# Patient Record
Sex: MALE | Race: WHITE | NOT HISPANIC OR LATINO | Employment: FULL TIME | URBAN - METROPOLITAN AREA
[De-identification: names, ages, dates, MRNs, and addresses within clinical notes are randomized per-mention and may not be internally consistent; named-entity substitution may affect disease eponyms.]

---

## 2023-10-25 ENCOUNTER — TELEPHONE (OUTPATIENT)
Dept: CARDIOLOGY | Facility: CLINIC | Age: 68
End: 2023-10-25
Payer: MEDICARE

## 2023-10-25 RX ORDER — FOLIC ACID/MULTIVIT,IRON,MINER 0.4MG-18MG
TABLET ORAL
COMMUNITY

## 2023-10-25 RX ORDER — METOPROLOL SUCCINATE 100 MG/1
100 TABLET, EXTENDED RELEASE ORAL DAILY
COMMUNITY
Start: 2023-08-11 | End: 2024-05-17 | Stop reason: SDUPTHER

## 2023-10-25 RX ORDER — ATORVASTATIN CALCIUM 20 MG/1
20 TABLET, FILM COATED ORAL DAILY
COMMUNITY
Start: 2023-09-05 | End: 2024-03-04 | Stop reason: SDUPTHER

## 2023-10-25 RX ORDER — LISINOPRIL 10 MG/1
10 TABLET ORAL DAILY
COMMUNITY
End: 2024-05-17 | Stop reason: SDUPTHER

## 2023-10-25 NOTE — TELEPHONE ENCOUNTER
He needs a refill on his eliquis, but it goes to the [place] [facility]- can you please print it out to be faxed

## 2023-10-30 DIAGNOSIS — I48.91 ATRIAL FIBRILLATION, UNSPECIFIED TYPE (MULTI): Primary | ICD-10-CM

## 2023-11-15 ENCOUNTER — TELEPHONE (OUTPATIENT)
Dept: CARDIOLOGY | Facility: CLINIC | Age: 68
End: 2023-11-15
Payer: MEDICARE

## 2023-12-01 RX ORDER — DEXTROMETHORPHAN HYDROBROMIDE, GUAIFENESIN 5; 100 MG/5ML; MG/5ML
650 LIQUID ORAL EVERY 8 HOURS PRN
COMMUNITY

## 2023-12-04 ENCOUNTER — HOSPITAL ENCOUNTER (OUTPATIENT)
Dept: GASTROENTEROLOGY | Facility: HOSPITAL | Age: 68
Discharge: HOME | End: 2023-12-04
Payer: MEDICARE

## 2023-12-04 ENCOUNTER — ANESTHESIA EVENT (OUTPATIENT)
Dept: GASTROENTEROLOGY | Facility: HOSPITAL | Age: 68
End: 2023-12-04
Payer: MEDICARE

## 2023-12-04 ENCOUNTER — ANESTHESIA (OUTPATIENT)
Dept: GASTROENTEROLOGY | Facility: HOSPITAL | Age: 68
End: 2023-12-04
Payer: MEDICARE

## 2023-12-04 VITALS
TEMPERATURE: 97.2 F | BODY MASS INDEX: 33.02 KG/M2 | RESPIRATION RATE: 16 BRPM | HEART RATE: 80 BPM | DIASTOLIC BLOOD PRESSURE: 88 MMHG | OXYGEN SATURATION: 97 % | HEIGHT: 73 IN | SYSTOLIC BLOOD PRESSURE: 156 MMHG | WEIGHT: 249.12 LBS

## 2023-12-04 DIAGNOSIS — Z12.11 ENCOUNTER FOR SCREENING FOR MALIGNANT NEOPLASM OF COLON: Primary | ICD-10-CM

## 2023-12-04 PROBLEM — I10 HTN (HYPERTENSION): Status: ACTIVE | Noted: 2023-12-04

## 2023-12-04 PROBLEM — I48.91 ATRIAL FIBRILLATION (MULTI): Status: ACTIVE | Noted: 2023-12-04

## 2023-12-04 PROCEDURE — 0753T DGTZ GLS MCRSCP SLD LEVEL IV: CPT | Mod: TC,SUR,PARLAB | Performed by: INTERNAL MEDICINE

## 2023-12-04 PROCEDURE — 88305 TISSUE EXAM BY PATHOLOGIST: CPT | Performed by: PATHOLOGY

## 2023-12-04 PROCEDURE — 3700000002 HC GENERAL ANESTHESIA TIME - EACH INCREMENTAL 1 MINUTE

## 2023-12-04 PROCEDURE — 2500000004 HC RX 250 GENERAL PHARMACY W/ HCPCS (ALT 636 FOR OP/ED): Performed by: NURSE ANESTHETIST, CERTIFIED REGISTERED

## 2023-12-04 PROCEDURE — A45385 PR COLONOSCOPY,REMV LESN,SNARE: Performed by: ANESTHESIOLOGY

## 2023-12-04 PROCEDURE — 45385 COLONOSCOPY W/LESION REMOVAL: CPT | Performed by: INTERNAL MEDICINE

## 2023-12-04 PROCEDURE — 2500000005 HC RX 250 GENERAL PHARMACY W/O HCPCS: Performed by: NURSE ANESTHETIST, CERTIFIED REGISTERED

## 2023-12-04 PROCEDURE — 7100000010 HC PHASE TWO TIME - EACH INCREMENTAL 1 MINUTE

## 2023-12-04 PROCEDURE — 3700000001 HC GENERAL ANESTHESIA TIME - INITIAL BASE CHARGE

## 2023-12-04 PROCEDURE — A45385 PR COLONOSCOPY,REMV LESN,SNARE: Performed by: NURSE ANESTHETIST, CERTIFIED REGISTERED

## 2023-12-04 PROCEDURE — 2500000004 HC RX 250 GENERAL PHARMACY W/ HCPCS (ALT 636 FOR OP/ED): Performed by: INTERNAL MEDICINE

## 2023-12-04 PROCEDURE — 45380 COLONOSCOPY AND BIOPSY: CPT | Performed by: INTERNAL MEDICINE

## 2023-12-04 PROCEDURE — 7100000009 HC PHASE TWO TIME - INITIAL BASE CHARGE

## 2023-12-04 RX ORDER — PROPOFOL 10 MG/ML
INJECTION, EMULSION INTRAVENOUS AS NEEDED
Status: DISCONTINUED | OUTPATIENT
Start: 2023-12-04 | End: 2023-12-04

## 2023-12-04 RX ORDER — ONDANSETRON HYDROCHLORIDE 2 MG/ML
4 INJECTION, SOLUTION INTRAVENOUS ONCE AS NEEDED
Status: DISCONTINUED | OUTPATIENT
Start: 2023-12-04 | End: 2023-12-05 | Stop reason: HOSPADM

## 2023-12-04 RX ORDER — PROPOFOL 10 MG/ML
INJECTION, EMULSION INTRAVENOUS CONTINUOUS PRN
Status: DISCONTINUED | OUTPATIENT
Start: 2023-12-04 | End: 2023-12-04

## 2023-12-04 RX ORDER — SODIUM CHLORIDE, SODIUM LACTATE, POTASSIUM CHLORIDE, CALCIUM CHLORIDE 600; 310; 30; 20 MG/100ML; MG/100ML; MG/100ML; MG/100ML
20 INJECTION, SOLUTION INTRAVENOUS CONTINUOUS
Status: DISCONTINUED | OUTPATIENT
Start: 2023-12-04 | End: 2023-12-05 | Stop reason: HOSPADM

## 2023-12-04 RX ORDER — LIDOCAINE HCL/PF 100 MG/5ML
SYRINGE (ML) INTRAVENOUS AS NEEDED
Status: DISCONTINUED | OUTPATIENT
Start: 2023-12-04 | End: 2023-12-04

## 2023-12-04 RX ADMIN — PROPOFOL 30 MG: 10 INJECTION, EMULSION INTRAVENOUS at 13:10

## 2023-12-04 RX ADMIN — LIDOCAINE HYDROCHLORIDE 60 MG: 20 INJECTION INTRAVENOUS at 13:08

## 2023-12-04 RX ADMIN — PROPOFOL 30 MG: 10 INJECTION, EMULSION INTRAVENOUS at 13:08

## 2023-12-04 RX ADMIN — PROPOFOL 150 MCG/KG/MIN: 10 INJECTION, EMULSION INTRAVENOUS at 13:08

## 2023-12-04 RX ADMIN — SODIUM CHLORIDE, SODIUM LACTATE, POTASSIUM CHLORIDE, AND CALCIUM CHLORIDE: 600; 310; 30; 20 INJECTION, SOLUTION INTRAVENOUS at 13:02

## 2023-12-04 SDOH — HEALTH STABILITY: MENTAL HEALTH: CURRENT SMOKER: 0

## 2023-12-04 ASSESSMENT — PAIN SCALES - GENERAL
PAINLEVEL_OUTOF10: 0 - NO PAIN

## 2023-12-04 ASSESSMENT — PAIN - FUNCTIONAL ASSESSMENT: PAIN_FUNCTIONAL_ASSESSMENT: 0-10

## 2023-12-04 NOTE — DISCHARGE INSTRUCTIONS
Patient Instructions after a Colonoscopy      The anesthetics, sedatives or narcotics which were given to you today will be acting in your body for the next 24 hours, so you might feel a little sleepy or groggy.  This feeling should slowly wear off. Carefully read and follow the instructions.     You received sedation today:  - Do not drive or operate any machinery or power tools of any kind.   - No alcoholic beverages today, not even beer or wine.  - Do not make any important decisions or sign any legal documents.  - No over the counter medications that contain alcohol or that may cause drowsiness.  - Do not make any important decisions or sign any legal documents.    While it is common to experience mild to moderate abdominal distention, gas, or belching after your procedure, if any of these symptoms occur following discharge from the GI Lab or within one week of having your procedure, call the [facility] to be advised whether a visit to your nearest Urgent Care or Emergency Department is indicated.  Take this paper with you if you go.     - If you develop an allergic reaction to the medications that were given during your procedure such as difficulty breathing, rash, hives, severe nausea, vomiting or lightheadedness.  - If you experience chest pain, shortness of breath, severe abdominal pain, fevers and chills.  -If you develop signs and symptoms of bleeding such as blood in your spit, if your stools turn black, tarry, or bloody  - If you have not urinated within 8 hours following your procedure.  - If your IV site becomes painful, red, inflamed, or looks infected.    If you received a biopsy/polypectomy/sphincterotomy the following instructions apply below:    __ Do not use Aspirin containing products, non-steroidal medications or anti-coagulants for one week following your procedure. (Examples of these types of medications are: Advil, Arthrotec, Aleve, Coumadin, Ecotrin, Heparin, Ibuprofen,  Indocin, Motrin, Naprosyn, Nuprin, Plavix, Vioxx, and Voltarin, or their generic forms.  This list is not all-inclusive.  Check with your physician or pharmacist before resuming medications.)   __ Eat a soft diet today.  Avoid foods that are poorly digested for the next 24 hours.  These foods would include: nuts, beans, lettuce, red meats, and fried foods. Start with liquids and advance your diet as tolerated, gradually work up to eating solids.   __ Do not have a Barium Study or Enema for one week.    Your physician recommends the additional following instructions:    -You have a contact number available for emergencies. The signs and symptoms of potential delayed complications were discussed with you. You may return to normal activities tomorrow.  -Resume your previous diet.  -Continue your present medications.   -We are waiting for your pathology results.  -Your physician has recommended a repeat colonoscopy (date to be determined after pending pathology results are reviewed) for surveillance based on pathology results.  -The findings and recommendations have been discussed with you.  -The findings and recommendations were discussed with your family.  - Please see Medication Reconciliation Form for new medication/medications prescribed.       If you experience any problems or have any questions following discharge from the GI Lab, please call:        Nurse Signature                                                                        Date___________________                                                                            Patient/Responsible Party Signature                                        Date___________________

## 2023-12-04 NOTE — ANESTHESIA POSTPROCEDURE EVALUATION
Patient: Navi Valdez    Procedure Summary       Date: 12/04/23 Room / Location: [facility]    Anesthesia Start: 1302 Anesthesia Stop: 1334    Procedure: COLONOSCOPY Diagnosis:       Encounter for screening for malignant neoplasm of colon      Encounter for screening for malignant neoplasm of colon    Scheduled Providers: Dea Licona MD Responsible Provider: Jj Russo MD    Anesthesia Type: MAC ASA Status: 3            Anesthesia Type: MAC    Vitals Value Taken Time   /81 12/04/23 1345   Temp 36.2 °C (97.2 °F) 12/04/23 1334   Pulse 78 12/04/23 1345   Resp 16 12/04/23 1345   SpO2 97 % 12/04/23 1345       Anesthesia Post Evaluation    Patient location during evaluation: PACU  Patient participation: complete - patient participated  Level of consciousness: awake and alert  Pain management: adequate  Airway patency: patent  Cardiovascular status: acceptable  Respiratory status: acceptable  Hydration status: acceptable  Postoperative Nausea and Vomiting: none        No notable events documented.

## 2023-12-04 NOTE — POST-PROCEDURE NOTE
Pt is tolerating PO snack well.  Reviewed discharge instructions, educated pt and family on anesthesia safety. Pt states is a -will need a note/work excuse for no driving 24hrs-note given  All pre-op belongings with the pt.

## 2023-12-04 NOTE — ANESTHESIA PREPROCEDURE EVALUATION
Patient: Navi Valdez    Procedure Information       Date/Time: 12/04/23 1300    Scheduled providers: Dea Licona MD    Procedure: COLONOSCOPY    Location: [facility]            Relevant Problems   Anesthesia (within normal limits)      Cardiovascular   (+) Atrial fibrillation (CMS/HCC)   (+) HTN (hypertension)       Clinical information reviewed:   Tobacco  Allergies  Meds   Med Hx  Surg Hx   Fam Hx  Soc Hx        NPO Detail:  NPO/Void Status  Carbonhydrate Drink Given Prior to Surgery? : N  Date of Last Liquid: 12/03/23  Time of Last Liquid: 0600  Date of Last Solid: 12/02/23  Time of Last Solid: 0600  Last Intake Type: Solid meal  Time of Last Void: 1238         Physical Exam    Airway  Mallampati: II  TM distance: >3 FB  Neck ROM: full     Cardiovascular   Rhythm: irregular  Rate: normal     Dental   (+) upper dentures, lower dentures     Pulmonary - normal exam     Abdominal            Anesthesia Plan    ASA 3     MAC     The patient is not a current smoker.    Anesthetic plan and risks discussed with patient.    Plan discussed with CRNA.

## 2023-12-11 LAB
LABORATORY COMMENT REPORT: NORMAL
PATH REPORT.FINAL DX SPEC: NORMAL
PATH REPORT.GROSS SPEC: NORMAL
PATH REPORT.TOTAL CANCER: NORMAL

## 2023-12-12 NOTE — RESULT ENCOUNTER NOTE
"Dear  Mr. Valdez,    It was a pleasure to see you at [facility] for your colonoscopy. During the procedure, polyp(s) were detected.  The biopsy from the polyp(s) showed that the removed polyp(s) was \"Adenomatous\". Adenomatous polyps may progress into Colon cancer over time if they are not removed. The polyps seen during your procedure was (were) removed completely.    Biopsy of the polyp(s) did not reveal cancer.    Because we have concerns that you may develop adenomatous polyps in the future we would like to repeat the colonoscopy in  3 years.    Please keep this letter for your records and talk to your PCP regarding a referral for a repeat colonoscopy when it is due.    Thank you for the opportunity to participate in your care.     If you have any questions or concerns about the findings or my recommendations please do not hesitate to contact our office at [phone].    Best Regards,  Dr. Licona"

## 2024-01-31 DIAGNOSIS — M25.551 ACUTE HIP PAIN, RIGHT: ICD-10-CM

## 2024-02-01 ENCOUNTER — HOSPITAL ENCOUNTER (OUTPATIENT)
Dept: RADIOLOGY | Facility: CLINIC | Age: 69
Discharge: HOME | End: 2024-02-01
Payer: MEDICARE

## 2024-02-01 ENCOUNTER — OFFICE VISIT (OUTPATIENT)
Dept: ORTHOPEDIC SURGERY | Facility: CLINIC | Age: 69
End: 2024-02-01
Payer: MEDICARE

## 2024-02-01 VITALS — HEIGHT: 73 IN | WEIGHT: 260 LBS | BODY MASS INDEX: 34.46 KG/M2

## 2024-02-01 DIAGNOSIS — M25.552 LEFT HIP PAIN: ICD-10-CM

## 2024-02-01 DIAGNOSIS — M17.12 ARTHRITIS OF LEFT KNEE: ICD-10-CM

## 2024-02-01 DIAGNOSIS — Z96.642 HISTORY OF LEFT HIP REPLACEMENT: Primary | ICD-10-CM

## 2024-02-01 PROCEDURE — 1036F TOBACCO NON-USER: CPT | Performed by: ORTHOPAEDIC SURGERY

## 2024-02-01 PROCEDURE — 73502 X-RAY EXAM HIP UNI 2-3 VIEWS: CPT | Mod: LEFT SIDE | Performed by: RADIOLOGY

## 2024-02-01 PROCEDURE — 73502 X-RAY EXAM HIP UNI 2-3 VIEWS: CPT | Mod: LT

## 2024-02-01 PROCEDURE — 99213 OFFICE O/P EST LOW 20 MIN: CPT | Performed by: ORTHOPAEDIC SURGERY

## 2024-02-01 PROCEDURE — 1126F AMNT PAIN NOTED NONE PRSNT: CPT | Performed by: ORTHOPAEDIC SURGERY

## 2024-02-01 PROCEDURE — 1160F RVW MEDS BY RX/DR IN RCRD: CPT | Performed by: ORTHOPAEDIC SURGERY

## 2024-02-01 PROCEDURE — 1159F MED LIST DOCD IN RCRD: CPT | Performed by: ORTHOPAEDIC SURGERY

## 2024-02-01 NOTE — PROGRESS NOTES
Subjective    Patient ID: Navi Valdez is a 68 y.o. male.    Chief Complaint: OTHER (F/U LT SANDRA/DOS: 1/13/23/)       This is a 68-year-old male who is now 1 year status post left SANDRA for severe arthritis.  Patient states he has done extremely well and is not experiencing any groin or thigh pain.  With regard to his hip he is performing functions of ADL without issue including continuing to work as a .  His main complaint is left knee pain as a result of underlying arthritis.  He wishes to consider left knee replacement potentially this spring or summer.  In the past he has had injections into his left knee as well as other conservative treatment options.    This patient's past medical, social, and family history were reviewed as well as a review of systems including updates on the patient's information encounter sheet    Physical Examination  Constitutional: Patient's height and weight reviewed, appears well kempt  Psychiatric: Alert and oriented ×3, appropriate mood and behavior  Pulmonary: Breathing appears nonlabored, no apparent distress  Lymphatic: No appreciable lymphadenopathy to both the upper and lower extremities  Skin: No open lesions, rashes, ulcerations  Neurologic: Gross motor and sensory exam appear intact (except for abnormalities noted in the below muscle skeletal exam)    Musculoskeletal: The left hip is well aligned without rotation deformity nor shortening.  Excellent range of motion left hip without groin or thigh pain elicited.  Ambulates weightbearing as tolerated in the office today without the use of assistive device and a satisfactory gait.  The left knee has a lack of extension of 5 degrees and flexion limited to 115 degrees.  A varus alignment of 5 degrees and inability to correct to a valgus stress for the left knee    X-rays performed today of the left hip demonstrate the left total hip arthroplasty remains well-positioned without evidence component loosening    Assessment:  Doing well status post left SANDRA, left knee arthritis    Plan: With regard to the left hip he will follow-up every 3 years or so for reevaluation to include x-rays.  Otherwise he will perform functions of ADL as tolerated and avoid fall risk.  With regard to the left knee he will follow-up with Dr. Seth in the near future discussed the options for left total knee arthroplasty.          Current Outpatient Medications:     acetaminophen (Tylenol 8 HOUR) 650 mg ER tablet, Take 1 tablet (650 mg) by mouth every 8 hours if needed for mild pain (1 - 3). Do not crush, chew, or split., Disp: , Rfl:     apixaban (Eliquis) 5 mg tablet, Take 1 tablet (5 mg) by mouth 2 times a day., Disp: 90 tablet, Rfl: 3    atorvastatin (Lipitor) 20 mg tablet, Take 1 tablet (20 mg) by mouth once daily., Disp: , Rfl:     krill-omega-3-dha-epa-lipids 247-47-63-50 mg capsule, Take by mouth., Disp: , Rfl:     lisinopril 10 mg tablet, Take 1 tablet (10 mg) by mouth once daily., Disp: , Rfl:     metoprolol succinate XL (Toprol-XL) 100 mg 24 hr tablet, Take 1 tablet (100 mg) by mouth once daily., Disp: , Rfl:

## 2024-02-01 NOTE — LETTER
February 1, 2024     Patient: Navi Valdez   YOB: 1955   Date of Visit: 2/1/2024       To Whom It May Concern:    Navi Valdez  was seen in the office for an appointment today and may return to work on 2/2/24 .    If you have any questions or concerns, please don't hesitate to call.         Sincerely,        Michael A LoPresti, MD    CC: No Recipients

## 2024-03-01 DIAGNOSIS — M25.562 CHRONIC PAIN OF LEFT KNEE: ICD-10-CM

## 2024-03-01 DIAGNOSIS — G89.29 CHRONIC PAIN OF LEFT KNEE: ICD-10-CM

## 2024-03-04 DIAGNOSIS — I10 HYPERTENSION, UNSPECIFIED TYPE: ICD-10-CM

## 2024-03-05 ENCOUNTER — APPOINTMENT (OUTPATIENT)
Dept: ORTHOPEDIC SURGERY | Facility: CLINIC | Age: 69
End: 2024-03-05
Payer: MEDICARE

## 2024-03-05 RX ORDER — ATORVASTATIN CALCIUM 20 MG/1
20 TABLET, FILM COATED ORAL DAILY
Qty: 90 TABLET | Refills: 3 | Status: SHIPPED | OUTPATIENT
Start: 2024-03-05 | End: 2024-05-17 | Stop reason: SDUPTHER

## 2024-03-19 ENCOUNTER — OFFICE VISIT (OUTPATIENT)
Dept: ORTHOPEDIC SURGERY | Facility: CLINIC | Age: 69
End: 2024-03-19
Payer: MEDICARE

## 2024-03-19 ENCOUNTER — HOSPITAL ENCOUNTER (OUTPATIENT)
Dept: RADIOLOGY | Facility: CLINIC | Age: 69
Discharge: HOME | End: 2024-03-19
Payer: MEDICARE

## 2024-03-19 DIAGNOSIS — M25.562 CHRONIC PAIN OF LEFT KNEE: ICD-10-CM

## 2024-03-19 DIAGNOSIS — G89.29 CHRONIC PAIN OF LEFT KNEE: ICD-10-CM

## 2024-03-19 DIAGNOSIS — M17.12 ARTHRITIS OF LEFT KNEE: Primary | ICD-10-CM

## 2024-03-19 DIAGNOSIS — L98.9 SKIN LESION OF LEFT LOWER EXTREMITY: ICD-10-CM

## 2024-03-19 PROCEDURE — 99214 OFFICE O/P EST MOD 30 MIN: CPT | Performed by: ORTHOPAEDIC SURGERY

## 2024-03-19 PROCEDURE — 1160F RVW MEDS BY RX/DR IN RCRD: CPT | Performed by: ORTHOPAEDIC SURGERY

## 2024-03-19 PROCEDURE — 73564 X-RAY EXAM KNEE 4 OR MORE: CPT | Mod: LT

## 2024-03-19 PROCEDURE — 73564 X-RAY EXAM KNEE 4 OR MORE: CPT | Mod: LEFT SIDE | Performed by: RADIOLOGY

## 2024-03-19 PROCEDURE — 1036F TOBACCO NON-USER: CPT | Performed by: ORTHOPAEDIC SURGERY

## 2024-03-19 PROCEDURE — 1159F MED LIST DOCD IN RCRD: CPT | Performed by: ORTHOPAEDIC SURGERY

## 2024-03-20 NOTE — PROGRESS NOTES
68-year-old is seen with left knee pain.  He has been having persistent severe sharp shooting pain in the left knee.  Pain is worse with standing and walking and going up and down stairs and getting up and down from a chair and in and out of a car.  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  He has significant knee pain.  He has had cortisone injection with mild relief.  He is taken Tylenol.  He is done physical therapy.  He has had his left hip replaced and is doing well with this.    Pleasant and no acute distress. Walks with antalgic gait. Stands with varus alignment of the left knee and neutral on the right. Left knee range of motion is 5-110°. The knee is stable to varus and valgus stress Lachman and posterior drawer. There is a mild effusion. There is generalized tenderness. Right knee range of motion is 0-120°. There is no effusion. The knee is stable to varus and valgus stress Lachman and posterior drawer.     Multiple x-ray views of the left knee are personally reviewed and there are advanced degenerative changes with joint space narrowing and osteophyte formation subchondral sclerosis.    A discussion about arthritis was done.  Treatment options were reviewed.  Discussion was done about knee replacement.  The fact that knee replacement would be the definitive treatment at some point was reviewed.  The surgery and the postoperative course were discussed.  He is interested in proceeding with knee replacement later this year.  He has the need for a dermatology evaluation and treatment for a skin lesion that he has near at the area of the potential total knee incision.  He will follow-up later this year.

## 2024-04-27 ENCOUNTER — OFFICE VISIT (OUTPATIENT)
Dept: DERMATOLOGY | Facility: CLINIC | Age: 69
End: 2024-04-27
Payer: MEDICARE

## 2024-04-27 DIAGNOSIS — D48.5 NEOPLASM OF UNCERTAIN BEHAVIOR OF SKIN: ICD-10-CM

## 2024-04-27 DIAGNOSIS — R21 RASH AND OTHER NONSPECIFIC SKIN ERUPTION: Primary | ICD-10-CM

## 2024-04-27 PROCEDURE — 1160F RVW MEDS BY RX/DR IN RCRD: CPT | Performed by: NURSE PRACTITIONER

## 2024-04-27 PROCEDURE — 11102 TANGNTL BX SKIN SINGLE LES: CPT | Performed by: NURSE PRACTITIONER

## 2024-04-27 PROCEDURE — 88305 TISSUE EXAM BY PATHOLOGIST: CPT | Performed by: DERMATOLOGY

## 2024-04-27 PROCEDURE — 1159F MED LIST DOCD IN RCRD: CPT | Performed by: NURSE PRACTITIONER

## 2024-04-27 PROCEDURE — 99202 OFFICE O/P NEW SF 15 MIN: CPT | Performed by: NURSE PRACTITIONER

## 2024-04-27 RX ORDER — TRIAMCINOLONE ACETONIDE 1 MG/G
CREAM TOPICAL
Qty: 80 G | Refills: 3 | Status: SHIPPED | OUTPATIENT
Start: 2024-04-27

## 2024-04-27 NOTE — PROGRESS NOTES
Subjective     Navi aVldez is a 68 y.o. male who presents for the following: No chief complaint on file..     Review of Systems:  No other skin or systemic complaints other than what is documented elsewhere in the note.    The following portions of the chart were reviewed this encounter and updated as appropriate:          Skin Cancer History  No skin cancer on file.      Specialty Problems    None       Objective   Well appearing patient in no apparent distress; mood and affect are within normal limits.    A focused skin examination was performed. All findings within normal limits unless otherwise noted below.    Assessment/Plan   1. Neoplasm of uncertain behavior of skin  Left Lower Leg - Anterior  9mm hyperpigmented papule          Lesion biopsy  Type of biopsy: tangential    Informed consent: discussed and consent obtained    Timeout: patient name, date of birth, surgical site, and procedure verified    Procedure prep:  Patient was prepped and draped  Anesthesia: the lesion was anesthetized in a standard fashion    Anesthetic:  1% lidocaine w/ epinephrine 1-100,000 local infiltration  Instrument used: DermaBlade    Hemostasis achieved with: aluminum chloride    Outcome: patient tolerated procedure well    Post-procedure details: sterile dressing applied and wound care instructions given    Dressing type: petrolatum and bandage      Specimen 1 - Dermatopathology- DERM LAB  Differential Diagnosis: r/o NMSC vs trauma  Check Margins Yes/No?:    Comments:    Dermpath Lab: Routine Histopathology (formalin-fixed tissue)    -  Discussed differential with patient.   - Given uncertainty of clinical diagnosis, a biopsy is recommended in clinic today.   - The patient expressed understanding, is in agreement with this plan, and wishes to proceed with biopsy.   - Oral and written wound care instructions provided.   - Advised the patient that the office will call within 2 weeks to discuss biopsy results.

## 2024-05-01 LAB
LABORATORY COMMENT REPORT: NORMAL
PATH REPORT.FINAL DX SPEC: NORMAL
PATH REPORT.GROSS SPEC: NORMAL
PATH REPORT.MICROSCOPIC SPEC OTHER STN: NORMAL
PATH REPORT.RELEVANT HX SPEC: NORMAL
PATH REPORT.TOTAL CANCER: NORMAL

## 2024-05-02 NOTE — RESULT ENCOUNTER NOTE
Pt was contacted and notified of benign results at this time.  No further questions noted.      Lianna Spaulding RN

## 2024-05-15 ENCOUNTER — TELEPHONE (OUTPATIENT)
Dept: CARDIOLOGY | Facility: CLINIC | Age: 69
End: 2024-05-15
Payer: MEDICARE

## 2024-05-15 NOTE — TELEPHONE ENCOUNTER
Pt's wife called. Pt's DOT expires 6/2/24. He is scheduled for DOT physical tomorrow, 5/16. 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    Pt's last Dr BA office visit was 8/11/23, next OV is scheduled for 8/9/24. Last echo was done on 10/14/22.     Pt's wife is unsure who specifically these items need to be faxed to. I also explained that we wont be able to get a clearance note and echo faxed by tomorrow, Dr BA is in procedures today. I asked her to call back to confirm fax number.

## 2024-05-16 NOTE — TELEPHONE ENCOUNTER
Spoke with patient and aware  He stated he is not able to have his DOT physical until he has the cardiac clearance first. He is asking if he can reschedule his appt sooner so that he can meet the June 2nd deadline?  Advised unsure if we will be able to accommodate the deadline but will forward message to Dr. Marino assistant.

## 2024-05-17 ENCOUNTER — OFFICE VISIT (OUTPATIENT)
Dept: CARDIOLOGY | Facility: CLINIC | Age: 69
End: 2024-05-17
Payer: MEDICARE

## 2024-05-17 VITALS
SYSTOLIC BLOOD PRESSURE: 180 MMHG | DIASTOLIC BLOOD PRESSURE: 100 MMHG | OXYGEN SATURATION: 95 % | BODY MASS INDEX: 33.95 KG/M2 | HEIGHT: 73 IN | WEIGHT: 256.2 LBS | HEART RATE: 81 BPM

## 2024-05-17 DIAGNOSIS — I48.0 PAROXYSMAL ATRIAL FIBRILLATION (MULTI): ICD-10-CM

## 2024-05-17 DIAGNOSIS — I48.91 ATRIAL FIBRILLATION, UNSPECIFIED TYPE (MULTI): ICD-10-CM

## 2024-05-17 DIAGNOSIS — I25.10 ATHEROSCLEROSIS OF NATIVE CORONARY ARTERY OF NATIVE HEART WITHOUT ANGINA PECTORIS: Primary | ICD-10-CM

## 2024-05-17 DIAGNOSIS — I10 PRIMARY HYPERTENSION: ICD-10-CM

## 2024-05-17 DIAGNOSIS — I10 HYPERTENSION, UNSPECIFIED TYPE: ICD-10-CM

## 2024-05-17 PROBLEM — Z96.649 PRESENCE OF HIP JOINT PROSTHESIS: Status: ACTIVE | Noted: 2024-05-17

## 2024-05-17 PROBLEM — Z98.890 HISTORY OF REPAIR OF HIP JOINT: Status: ACTIVE | Noted: 2024-05-17

## 2024-05-17 PROBLEM — M16.12 ARTHRITIS OF LEFT HIP: Status: ACTIVE | Noted: 2024-05-17

## 2024-05-17 PROBLEM — E78.2 HYPERLIPIDEMIA, MIXED: Status: ACTIVE | Noted: 2018-01-09

## 2024-05-17 PROBLEM — K59.00 CONSTIPATION: Status: ACTIVE | Noted: 2024-05-17

## 2024-05-17 PROBLEM — M17.0 ARTHRITIS OF BOTH KNEES: Status: ACTIVE | Noted: 2024-05-17

## 2024-05-17 PROCEDURE — 3080F DIAST BP >= 90 MM HG: CPT | Performed by: INTERNAL MEDICINE

## 2024-05-17 PROCEDURE — 99215 OFFICE O/P EST HI 40 MIN: CPT | Performed by: INTERNAL MEDICINE

## 2024-05-17 PROCEDURE — 1160F RVW MEDS BY RX/DR IN RCRD: CPT | Performed by: INTERNAL MEDICINE

## 2024-05-17 PROCEDURE — 1159F MED LIST DOCD IN RCRD: CPT | Performed by: INTERNAL MEDICINE

## 2024-05-17 PROCEDURE — 93000 ELECTROCARDIOGRAM COMPLETE: CPT | Performed by: INTERNAL MEDICINE

## 2024-05-17 PROCEDURE — 3077F SYST BP >= 140 MM HG: CPT | Performed by: INTERNAL MEDICINE

## 2024-05-17 RX ORDER — METOPROLOL SUCCINATE 100 MG/1
100 TABLET, EXTENDED RELEASE ORAL DAILY
Qty: 90 TABLET | Refills: 3 | Status: SHIPPED | OUTPATIENT
Start: 2024-05-17 | End: 2025-05-17

## 2024-05-17 RX ORDER — ATORVASTATIN CALCIUM 20 MG/1
20 TABLET, FILM COATED ORAL DAILY
Qty: 90 TABLET | Refills: 3 | Status: SHIPPED | OUTPATIENT
Start: 2024-05-17 | End: 2025-05-17

## 2024-05-17 RX ORDER — LISINOPRIL 40 MG/1
40 TABLET ORAL DAILY
Qty: 90 TABLET | Refills: 3 | Status: SHIPPED | OUTPATIENT
Start: 2024-05-17 | End: 2025-05-17

## 2024-05-17 NOTE — Clinical Note
May 17, 2024       No Recipients    Patient: Navi Valdez   YOB: 1955   Date of Visit: 5/17/2024       Dear Dr. Malcolm Recipients:    Thank you for referring Navi Valdez to me for evaluation. Below are my notes for this consultation.  If you have questions, please do not hesitate to call me. I look forward to following your patient along with you.       Sincerely,     Harish Marino MD      CC:   No Recipients  ______________________________________________________________________________________        [facility] Outpatient Follow-up Visit     Reason for Visit: AF, CAD    HPI: Navi Valdez is a 68 y.o.  male who presents today for followup.     The patient is a 68-year-old male with a history of hypertension and osteoarthritis who initially presented in August 2022 for elective hip replacement. During induction, the patient went into atrial fibrillation. This happened 2 separate times. The case was canceled and went into atrial fibrillation again during recovery. He presents today for follow-up. He denies any chest discomfort, shortness of breath, palpitations, lightheadedness, syncope, orthopnea, PND, lower extremity edema.      Prior twelve-lead ECG demonstrates sinus rhythm with PAC. Or telemetry demonstrates atrial fibrillation. Echocardiogram 10/14/2022 demonstrates an ejection fraction of 50%. Event recorder August to September 2022 demonstrates an 8 beat run of NSVT. Cardiac CTA 11/9/2022 with nonobstructive CAD, less than 50% mid to distal LAD stenosis limited by gating artifact. Less than 20% circumflex artery stenosis. Less than 25% RCA stenosis. 12/30/2022 , , HDL 44, triglycerides 107.  5/17/2024 ECG: Sinus rhythm first-degree AV block, otherwise normal.     Past Medical History:   He has a past medical history of Personal history of other medical treatment and Personal history of other medical treatment.    Surgical History:   He has no past surgical history on  file.    Family History:   No family history on file.    Allergies:  Patient has no known allergies.     Social History:    · No alcohol use   · No illicit drug use   · Non-smoker (V49.89) (Z78.9)    Prior Cardiovascular Testing (Personally Reviewed):     Review of Systems:  Review of Systems   All other systems reviewed and are negative.      Outpatient Medications:    Current Outpatient Medications:     acetaminophen (Tylenol 8 HOUR) 650 mg ER tablet, Take 1 tablet (650 mg) by mouth every 8 hours if needed for mild pain (1 - 3). Do not crush, chew, or split., Disp: , Rfl:     apixaban (Eliquis) 5 mg tablet, Take 1 tablet (5 mg) by mouth 2 times a day., Disp: 90 tablet, Rfl: 3    atorvastatin (Lipitor) 20 mg tablet, Take 1 tablet (20 mg) by mouth once daily., Disp: 90 tablet, Rfl: 3    krill-omega-3-dha-epa-lipids 581-24-23-50 mg capsule, Take by mouth., Disp: , Rfl:     lisinopril 10 mg tablet, Take 1 tablet (10 mg) by mouth once daily., Disp: , Rfl:     metoprolol succinate XL (Toprol-XL) 100 mg 24 hr tablet, Take 1 tablet (100 mg) by mouth once daily., Disp: , Rfl:     triamcinolone (Kenalog) 0.1 % cream, Thin coat twice daily to affected areas on  for 3-4 weeks, then weekends only. Repeat every few months for flares., Disp: 80 g, Rfl: 3     Last Recorded Vitals  There were no vitals taken for this visit.    Physical Exam:    Physical Exam  Vitals reviewed.   Constitutional:       Appearance: Normal appearance.   HENT:      Head: Normocephalic and atraumatic.      Mouth/Throat:      Mouth: Mucous membranes are moist.      Pharynx: Oropharynx is clear.   Eyes:      Extraocular Movements: Extraocular movements intact.      Conjunctiva/sclera: Conjunctivae normal.   Cardiovascular:      Rate and Rhythm: Normal rate and regular rhythm.      Pulses: Normal pulses.      Heart sounds: Normal heart sounds.   Pulmonary:      Effort: Pulmonary effort is normal.      Breath sounds: Normal breath sounds.   Abdominal:       "General: Bowel sounds are normal.      Palpations: Abdomen is soft.   Musculoskeletal:         General: No swelling.      Cervical back: Neck supple.   Skin:     General: Skin is warm and dry.   Neurological:      General: No focal deficit present.      Mental Status: He is alert.   Psychiatric:         Mood and Affect: Mood normal.         Behavior: Behavior normal.         Lab/Radiology/Diagnostic Review:    Labs    Lab Results   Component Value Date    GLUCOSE 143 (H) 02/01/2023    CALCIUM 8.3 (L) 02/01/2023     02/01/2023    K 4.4 02/01/2023    CO2 27 02/01/2023     02/01/2023    BUN 20 02/01/2023    CREATININE 0.84 02/01/2023       Lab Results   Component Value Date    WBC 9.5 02/01/2023    HGB 11.5 (L) 02/01/2023    HCT 36.3 (L) 02/01/2023    MCV 92 02/01/2023     02/01/2023       Lab Results   Component Value Date    CHOL 175 12/30/2022     Lab Results   Component Value Date    HDL 44.4 12/30/2022     No results found for: \"LDLCALC\"  Lab Results   Component Value Date    TRIG 107 12/30/2022     No components found for: \"CHOLHDL\"    No results found for: \"BNP\"    No results found for: \"TSH\"    Assessment:   Patient is asymptomatic from a cardiac standpoint.    Patient states he was not taking his beta-blocker on the day of his NSVT. This may be the etiology of his arrhythmia.     Patient will religiously take his beta-blocker. [facility]     Given his diagnosis of coronary artery disease based on his cardiac CTA with an LDL of 109, he will increase atorvastatin to 20 mg daily. He will try to modify his diet to decrease his LDL.     Patient remains hypertensive.  He is on metoprolol succinate 100 mg daily.  Will increase lisinopril to 40 mg daily.  Will have him come in for blood pressure check in 1 week.     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     Patient will follow up with me in 12 months or sooner if he has more problems.     [facility] " [employer] Fax Number [phone]     Harish Marino MD

## 2024-05-17 NOTE — PROGRESS NOTES
[facility] Cardiology Outpatient Follow-up Visit     Reason for Visit: AF, CAD    HPI: Navi Valdez is a 68 y.o.  male who presents today for followup.     The patient is a 68-year-old male with a history of hypertension and osteoarthritis who initially presented in August 2022 for elective hip replacement. During induction, the patient went into atrial fibrillation. This happened 2 separate times. The case was canceled and went into atrial fibrillation again during recovery. He presents today for follow-up. He denies any chest discomfort, shortness of breath, palpitations, lightheadedness, syncope, orthopnea, PND, lower extremity edema.      Prior twelve-lead ECG demonstrates sinus rhythm with PAC. Or telemetry demonstrates atrial fibrillation. Echocardiogram 10/14/2022 demonstrates an ejection fraction of 50%. Event recorder August to September 2022 demonstrates an 8 beat run of NSVT. Cardiac CTA 11/9/2022 with nonobstructive CAD, less than 50% mid to distal LAD stenosis limited by gating artifact. Less than 20% circumflex artery stenosis. Less than 25% RCA stenosis. 12/30/2022 , , HDL 44, triglycerides 107.  5/17/2024 ECG: Sinus rhythm first-degree AV block, otherwise normal.     Past Medical History:   He has a past medical history of Personal history of other medical treatment and Personal history of other medical treatment.    Surgical History:   He has no past surgical history on file.    Family History:   No family history on file.    Allergies:  Patient has no known allergies.     Social History:    · No alcohol use   · No illicit drug use   · Non-smoker (V49.89) (Z78.9)    Prior Cardiovascular Testing (Personally Reviewed):     Review of Systems:  Review of Systems   All other systems reviewed and are negative.      Outpatient Medications:    Current Outpatient Medications:     acetaminophen (Tylenol 8 HOUR) 650 mg ER tablet, Take 1 tablet (650 mg) by mouth every 8 hours if needed for mild  pain (1 - 3). Do not crush, chew, or split., Disp: , Rfl:     apixaban (Eliquis) 5 mg tablet, Take 1 tablet (5 mg) by mouth 2 times a day., Disp: 90 tablet, Rfl: 3    atorvastatin (Lipitor) 20 mg tablet, Take 1 tablet (20 mg) by mouth once daily., Disp: 90 tablet, Rfl: 3    krill-omega-3-dha-epa-lipids 662-92-74-50 mg capsule, Take by mouth., Disp: , Rfl:     lisinopril 10 mg tablet, Take 1 tablet (10 mg) by mouth once daily., Disp: , Rfl:     metoprolol succinate XL (Toprol-XL) 100 mg 24 hr tablet, Take 1 tablet (100 mg) by mouth once daily., Disp: , Rfl:     triamcinolone (Kenalog) 0.1 % cream, Thin coat twice daily to affected areas on  for 3-4 weeks, then weekends only. Repeat every few months for flares., Disp: 80 g, Rfl: 3     Last Recorded Vitals  There were no vitals taken for this visit.    Physical Exam:    Physical Exam  Vitals reviewed.   Constitutional:       Appearance: Normal appearance.   HENT:      Head: Normocephalic and atraumatic.      Mouth/Throat:      Mouth: Mucous membranes are moist.      Pharynx: Oropharynx is clear.   Eyes:      Extraocular Movements: Extraocular movements intact.      Conjunctiva/sclera: Conjunctivae normal.   Cardiovascular:      Rate and Rhythm: Normal rate and regular rhythm.      Pulses: Normal pulses.      Heart sounds: Normal heart sounds.   Pulmonary:      Effort: Pulmonary effort is normal.      Breath sounds: Normal breath sounds.   Abdominal:      General: Bowel sounds are normal.      Palpations: Abdomen is soft.   Musculoskeletal:         General: No swelling.      Cervical back: Neck supple.   Skin:     General: Skin is warm and dry.   Neurological:      General: No focal deficit present.      Mental Status: He is alert.   Psychiatric:         Mood and Affect: Mood normal.         Behavior: Behavior normal.         Lab/Radiology/Diagnostic Review:    Labs    Lab Results   Component Value Date    GLUCOSE 143 (H) 02/01/2023    CALCIUM 8.3 (L) 02/01/2023    NA  "136 02/01/2023    K 4.4 02/01/2023    CO2 27 02/01/2023     02/01/2023    BUN 20 02/01/2023    CREATININE 0.84 02/01/2023       Lab Results   Component Value Date    WBC 9.5 02/01/2023    HGB 11.5 (L) 02/01/2023    HCT 36.3 (L) 02/01/2023    MCV 92 02/01/2023     02/01/2023       Lab Results   Component Value Date    CHOL 175 12/30/2022     Lab Results   Component Value Date    HDL 44.4 12/30/2022     No results found for: \"LDLCALC\"  Lab Results   Component Value Date    TRIG 107 12/30/2022     No components found for: \"CHOLHDL\"    No results found for: \"BNP\"    No results found for: \"TSH\"    Assessment:   Patient is asymptomatic from a cardiac standpoint.    Patient states he was not taking his beta-blocker on the day of his NSVT. This may be the etiology of his arrhythmia.     Patient will religiously take his beta-blocker. He will stay on Eliquis for atrial fibrillation.     Given his diagnosis of coronary artery disease based on his cardiac CTA with an LDL of 109, he will increase atorvastatin to 20 mg daily. He will try to modify his diet to decrease his LDL.     Patient remains hypertensive.  He is on metoprolol succinate 100 mg daily.  Will increase lisinopril to 40 mg daily.  Will have him come in for blood pressure check in 1 week.     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     Patient will follow up with me in 12 months or sooner if he has more problems.     [employer] Fax Number [phone]     Harish Marino MD      "

## 2024-05-17 NOTE — LETTER
May 17, 2024     Aracely Motley DO  [address]  [place] [place] [facility] [address]    Patient: Navi Valdez   YOB: 1955   Date of Visit: 5/17/2024       Dear Dr. Aracely Motley DO:    Thank you for referring aNvi Valdez to me for evaluation. Below are my notes for this consultation.  If you have questions, please do not hesitate to call me. I look forward to following your patient along with you.       Sincerely,     Harish Marino MD      CC: No Recipients  ______________________________________________________________________________________        [facility] Cardiology Outpatient Follow-up Visit     Reason for Visit: AF, CAD    HPI: Navi Valdez is a 68 y.o.  male who presents today for followup.     The patient is a 68-year-old male with a history of hypertension and osteoarthritis who initially presented in August 2022 for elective hip replacement. During induction, the patient went into atrial fibrillation. This happened 2 separate times. The case was canceled and went into atrial fibrillation again during recovery. He presents today for follow-up. He denies any chest discomfort, shortness of breath, palpitations, lightheadedness, syncope, orthopnea, PND, lower extremity edema.      Prior twelve-lead ECG demonstrates sinus rhythm with PAC. Or telemetry demonstrates atrial fibrillation. Echocardiogram 10/14/2022 demonstrates an ejection fraction of 50%. Event recorder August to September 2022 demonstrates an 8 beat run of NSVT. Cardiac CTA 11/9/2022 with nonobstructive CAD, less than 50% mid to distal LAD stenosis limited by gating artifact. Less than 20% circumflex artery stenosis. Less than 25% RCA stenosis. 12/30/2022 , , HDL 44, triglycerides 107.  5/17/2024 ECG: Sinus rhythm first-degree AV block, otherwise normal.     Past Medical History:   He has a past medical history of Personal history of other medical treatment and Personal history of other  medical treatment.    Surgical History:   He has no past surgical history on file.    Family History:   No family history on file.    Allergies:  Patient has no known allergies.     Social History:    · No alcohol use   · No illicit drug use   · Non-smoker (V49.89) (Z78.9)    Prior Cardiovascular Testing (Personally Reviewed):     Review of Systems:  Review of Systems   All other systems reviewed and are negative.      Outpatient Medications:    Current Outpatient Medications:   •  acetaminophen (Tylenol 8 HOUR) 650 mg ER tablet, Take 1 tablet (650 mg) by mouth every 8 hours if needed for mild pain (1 - 3). Do not crush, chew, or split., Disp: , Rfl:   •  apixaban (Eliquis) 5 mg tablet, Take 1 tablet (5 mg) by mouth 2 times a day., Disp: 90 tablet, Rfl: 3  •  atorvastatin (Lipitor) 20 mg tablet, Take 1 tablet (20 mg) by mouth once daily., Disp: 90 tablet, Rfl: 3  •  krill-omega-3-dha-epa-lipids 439-07-03-50 mg capsule, Take by mouth., Disp: , Rfl:   •  lisinopril 10 mg tablet, Take 1 tablet (10 mg) by mouth once daily., Disp: , Rfl:   •  metoprolol succinate XL (Toprol-XL) 100 mg 24 hr tablet, Take 1 tablet (100 mg) by mouth once daily., Disp: , Rfl:   •  triamcinolone (Kenalog) 0.1 % cream, Thin coat twice daily to affected areas on  for 3-4 weeks, then weekends only. Repeat every few months for flares., Disp: 80 g, Rfl: 3     Last Recorded Vitals  There were no vitals taken for this visit.    Physical Exam:    Physical Exam  Vitals reviewed.   Constitutional:       Appearance: Normal appearance.   HENT:      Head: Normocephalic and atraumatic.      Mouth/Throat:      Mouth: Mucous membranes are moist.      Pharynx: Oropharynx is clear.   Eyes:      Extraocular Movements: Extraocular movements intact.      Conjunctiva/sclera: Conjunctivae normal.   Cardiovascular:      Rate and Rhythm: Normal rate and regular rhythm.      Pulses: Normal pulses.      Heart sounds: Normal heart sounds.   Pulmonary:      Effort:  "Pulmonary effort is normal.      Breath sounds: Normal breath sounds.   Abdominal:      General: Bowel sounds are normal.      Palpations: Abdomen is soft.   Musculoskeletal:         General: No swelling.      Cervical back: Neck supple.   Skin:     General: Skin is warm and dry.   Neurological:      General: No focal deficit present.      Mental Status: He is alert.   Psychiatric:         Mood and Affect: Mood normal.         Behavior: Behavior normal.         Lab/Radiology/Diagnostic Review:    Labs    Lab Results   Component Value Date    GLUCOSE 143 (H) 02/01/2023    CALCIUM 8.3 (L) 02/01/2023     02/01/2023    K 4.4 02/01/2023    CO2 27 02/01/2023     02/01/2023    BUN 20 02/01/2023    CREATININE 0.84 02/01/2023       Lab Results   Component Value Date    WBC 9.5 02/01/2023    HGB 11.5 (L) 02/01/2023    HCT 36.3 (L) 02/01/2023    MCV 92 02/01/2023     02/01/2023       Lab Results   Component Value Date    CHOL 175 12/30/2022     Lab Results   Component Value Date    HDL 44.4 12/30/2022     No results found for: \"LDLCALC\"  Lab Results   Component Value Date    TRIG 107 12/30/2022     No components found for: \"CHOLHDL\"    No results found for: \"BNP\"    No results found for: \"TSH\"    Assessment:   Patient is asymptomatic from a cardiac standpoint.    Patient states he was not taking his beta-blocker on the day of his NSVT. This may be the etiology of his arrhythmia.     Patient will religiously take his beta-blocker. He will stay on Eliquis for atrial fibrillation.     Given his diagnosis of coronary artery disease based on his cardiac CTA with an LDL of 109, he will increase atorvastatin to 20 mg daily. He will try to modify his diet to decrease his LDL.     Patient remains hypertensive.  He is on metoprolol succinate 100 mg daily.  Will increase lisinopril to 40 mg daily.  Will have him come in for blood pressure check in 1 week.     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   "   Patient will follow up with me in 12 months or sooner if he has more problems.     [facility] Fax Number [phone]     Harish Marino MD

## 2024-05-31 ENCOUNTER — TELEPHONE (OUTPATIENT)
Dept: CARDIOLOGY | Facility: CLINIC | Age: 69
End: 2024-05-31
Payer: MEDICARE

## 2024-05-31 NOTE — TELEPHONE ENCOUNTER
Patient was scheduled for bp check on 5/24 d/t medication changes made at OV on 5/17. He did not come to visit d/t work issues.    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  Patient tolerating medication without adverse effects.    Wife asking if patient needs to reschedule bp check?

## 2024-08-06 ENCOUNTER — APPOINTMENT (OUTPATIENT)
Dept: UROLOGY | Facility: HOSPITAL | Age: 69
End: 2024-08-06
Payer: MEDICARE

## 2024-08-09 ENCOUNTER — APPOINTMENT (OUTPATIENT)
Dept: UROLOGY | Facility: CLINIC | Age: 69
End: 2024-08-09
Payer: MEDICARE

## 2024-08-09 ENCOUNTER — TELEPHONE (OUTPATIENT)
Dept: UROLOGY | Facility: HOSPITAL | Age: 69
End: 2024-08-09

## 2024-08-09 VITALS
WEIGHT: 257 LBS | HEART RATE: 72 BPM | SYSTOLIC BLOOD PRESSURE: 129 MMHG | DIASTOLIC BLOOD PRESSURE: 85 MMHG | TEMPERATURE: 98.4 F | BODY MASS INDEX: 33.91 KG/M2

## 2024-08-09 DIAGNOSIS — R31.9 HEMATURIA, UNSPECIFIED TYPE: ICD-10-CM

## 2024-08-09 LAB
BACTERIA #/AREA URNS AUTO: ABNORMAL /HPF
RBC #/AREA URNS AUTO: ABNORMAL /HPF
SQUAMOUS #/AREA URNS AUTO: ABNORMAL /HPF
WBC #/AREA URNS AUTO: ABNORMAL /HPF
YEAST BUDDING #/AREA UR COMP ASSIST: PRESENT /HPF

## 2024-08-09 PROCEDURE — 81001 URINALYSIS AUTO W/SCOPE: CPT

## 2024-08-09 NOTE — ASSESSMENT & PLAN NOTE
We discussed needing to confirm his diagnosis of hematuria. We will order a microscopic urinalysis. If this comes back >3 RBC then will plan for a CT urogram.

## 2024-08-09 NOTE — PROGRESS NOTES
Subjective   Chief Complaint   Patient presents with    Blood in Urine     \[facility]\""  Navi Valdez is a 68 y.o. male patient who presents for hematuria, referred by his PCP.  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  I have no records.  This sounds like it was just a urinary dipstick.  No gross hematuria.    We discussed his labs in office today. Creatinine 0.75, PSA 0.5. There I no assessment of urine studies available in office.     The patient has a history of CVD, HTN, and HLD. He is currently taking medication for these including a blood thinner.      The patient reports no family history of prostate cancer that is known to him.    He is not a smoker.     Review of Systems  All systems were reviewed. Anything negative was noted in the HPI.    Objective   Physical Exam  General: Well developed, well nourished, alert and cooperative, appears in no acute distress   Eyes: Non-injected conjunctiva, sclera clear, no proptosis   Cardiac: Extremities are warm and well perfused. No edema, cyanosis or pallor   Lungs: Breathing is easy, non-labored. Speaking in clear and complete sentences. Normal diaphragmatic movement   MSK: Ambulatory with steady gait, unassisted   Neuro: Alert and oriented to person, place, and time   Psych: Demonstrates good judgment and reason, without hallucinations, abnormal affect or abnormal behaviors   Skin: No obvious lesions, no rashes       No CVA tenderness bilaterally   No suprapubic pain or discomfort    Past Medical History:   Diagnosis Date    Personal history of other medical treatment     History of cardiac monitoring    Personal history of other medical treatment     History of echocardiogram     Assessment/Plan   Problem List Items Addressed This Visit       Hematuria     We discussed needing to confirm his diagnosis of hematuria. We will order a microscopic urinalysis. If this comes back >3 RBC then will plan for a CT urogram.         Relevant Orders    Microscopic Only, Urine     If  microscopically positive:  Discussed with the patient that anyone with gross or visible blood in the urine or blood demonstrated on microscopic analysis is referred to Urology for evaluation.  Most of the time, the evaluation does not find a cause, but we do the evaluation to rule out significant causes of blood in the urine including stones, kidney cancer, bladder cancer, UTI, and numerous other conditions.  The evaluation includes an upper tract evaluation which normally is cross sectional imaging of the kidneys and ureters (US, CT or MRI) as well cystoscopy of the lower tract to rule out bladder/urethra pathology.  Additional tests such as urine studies, PSA assessment in males may be ordered as well.      Plan:  Order microscopic urinalysis   Follow-up if positive for hematuria         Scribe Attestation  By signing my name below, Sherrie COSTA Scribe   attest that this documentation has been prepared under the direction and in the presence of Zaheer Jain MD MPH.

## 2024-08-12 DIAGNOSIS — I10 PRIMARY HYPERTENSION: ICD-10-CM

## 2024-08-12 DIAGNOSIS — I48.0 PAROXYSMAL ATRIAL FIBRILLATION (MULTI): ICD-10-CM

## 2024-08-13 RX ORDER — METOPROLOL SUCCINATE 100 MG/1
100 TABLET, EXTENDED RELEASE ORAL DAILY
Qty: 90 TABLET | Refills: 3 | Status: SHIPPED | OUTPATIENT
Start: 2024-08-13 | End: 2025-08-13

## 2024-08-22 DIAGNOSIS — I10 PRIMARY HYPERTENSION: ICD-10-CM

## 2024-08-23 RX ORDER — LISINOPRIL 40 MG/1
40 TABLET ORAL DAILY
Qty: 90 TABLET | Refills: 3 | Status: SHIPPED | OUTPATIENT
Start: 2024-08-23 | End: 2025-08-23

## 2024-10-04 ENCOUNTER — TELEPHONE (OUTPATIENT)
Dept: CARDIOLOGY | Facility: CLINIC | Age: 69
End: 2024-10-04
Payer: MEDICARE

## 2024-10-04 DIAGNOSIS — I10 HYPERTENSION, UNSPECIFIED TYPE: ICD-10-CM

## 2024-10-04 DIAGNOSIS — I25.10 ATHEROSCLEROSIS OF CORONARY ARTERY OF NATIVE HEART, UNSPECIFIED VESSEL OR LESION TYPE, UNSPECIFIED WHETHER ANGINA PRESENT: Primary | ICD-10-CM

## 2024-10-04 DIAGNOSIS — R60.0 EDEMA OF BOTH LOWER LEGS: ICD-10-CM

## 2024-10-04 DIAGNOSIS — R06.00 DYSPNEA, UNSPECIFIED TYPE: ICD-10-CM

## 2024-10-04 NOTE — TELEPHONE ENCOUNTER
"Wife called to report patient has been having lower ext edema, off and on over the past 2mos but most recently has been \"really bad\". Started in RLE and now in LLE, R>L. Stated patient is not SOB, no palpitations, no sudden weight gain. Denies pain, redness to calves. Patient is at work and unable to discuss with him.    Stated patient does not normally have edema and is not taking diuretic.  "

## 2024-10-07 RX ORDER — HYDROCHLOROTHIAZIDE 25 MG/1
25 TABLET ORAL DAILY
Qty: 30 TABLET | Refills: 11 | Status: SHIPPED | OUTPATIENT
Start: 2024-10-07 | End: 2025-10-07

## 2024-10-09 DIAGNOSIS — I25.10 ATHEROSCLEROSIS OF CORONARY ARTERY OF NATIVE HEART, UNSPECIFIED VESSEL OR LESION TYPE, UNSPECIFIED WHETHER ANGINA PRESENT: ICD-10-CM

## 2024-10-09 DIAGNOSIS — R60.0 EDEMA OF BOTH LOWER LEGS: ICD-10-CM

## 2024-10-17 RX ORDER — HYDROCHLOROTHIAZIDE 25 MG/1
25 TABLET ORAL DAILY
Qty: 90 TABLET | Refills: 3 | Status: SHIPPED | OUTPATIENT
Start: 2024-10-17 | End: 2025-10-17

## 2024-10-19 ENCOUNTER — LAB (OUTPATIENT)
Dept: LAB | Facility: LAB | Age: 69
End: 2024-10-19
Payer: MEDICARE

## 2024-10-19 DIAGNOSIS — R06.00 DYSPNEA, UNSPECIFIED TYPE: ICD-10-CM

## 2024-10-19 DIAGNOSIS — R60.0 EDEMA OF BOTH LOWER LEGS: ICD-10-CM

## 2024-10-19 DIAGNOSIS — I25.10 ATHEROSCLEROSIS OF CORONARY ARTERY OF NATIVE HEART, UNSPECIFIED VESSEL OR LESION TYPE, UNSPECIFIED WHETHER ANGINA PRESENT: ICD-10-CM

## 2024-10-19 LAB
ANION GAP SERPL CALC-SCNC: 10 MMOL/L (ref 10–20)
BNP SERPL-MCNC: 67 PG/ML (ref 0–99)
BUN SERPL-MCNC: 25 MG/DL (ref 6–23)
CALCIUM SERPL-MCNC: 8.7 MG/DL (ref 8.6–10.3)
CHLORIDE SERPL-SCNC: 104 MMOL/L (ref 98–107)
CO2 SERPL-SCNC: 30 MMOL/L (ref 21–32)
CREAT SERPL-MCNC: 0.99 MG/DL (ref 0.5–1.3)
EGFRCR SERPLBLD CKD-EPI 2021: 83 ML/MIN/1.73M*2
GLUCOSE SERPL-MCNC: 89 MG/DL (ref 74–99)
POTASSIUM SERPL-SCNC: 4.5 MMOL/L (ref 3.5–5.3)
SODIUM SERPL-SCNC: 139 MMOL/L (ref 136–145)

## 2024-10-19 PROCEDURE — 36415 COLL VENOUS BLD VENIPUNCTURE: CPT

## 2024-10-19 PROCEDURE — 80048 BASIC METABOLIC PNL TOTAL CA: CPT

## 2024-10-19 PROCEDURE — 83880 ASSAY OF NATRIURETIC PEPTIDE: CPT

## 2024-11-06 PROBLEM — Z98.890 HISTORY OF REPAIR OF HIP JOINT: Status: RESOLVED | Noted: 2024-05-17 | Resolved: 2024-11-06

## 2024-11-22 ENCOUNTER — HOSPITAL ENCOUNTER (EMERGENCY)
Facility: HOSPITAL | Age: 69
Discharge: HOME | End: 2024-11-22
Attending: EMERGENCY MEDICINE
Payer: MEDICARE

## 2024-11-22 ENCOUNTER — APPOINTMENT (OUTPATIENT)
Dept: RADIOLOGY | Facility: HOSPITAL | Age: 69
End: 2024-11-22
Payer: MEDICARE

## 2024-11-22 VITALS
RESPIRATION RATE: 18 BRPM | OXYGEN SATURATION: 93 % | BODY MASS INDEX: 34.59 KG/M2 | WEIGHT: 255.4 LBS | SYSTOLIC BLOOD PRESSURE: 134 MMHG | HEIGHT: 72 IN | DIASTOLIC BLOOD PRESSURE: 75 MMHG | HEART RATE: 93 BPM | TEMPERATURE: 97.3 F

## 2024-11-22 DIAGNOSIS — M54.41 ACUTE BILATERAL LOW BACK PAIN WITH BILATERAL SCIATICA: Primary | ICD-10-CM

## 2024-11-22 DIAGNOSIS — M54.42 ACUTE BILATERAL LOW BACK PAIN WITH BILATERAL SCIATICA: Primary | ICD-10-CM

## 2024-11-22 PROCEDURE — 99284 EMERGENCY DEPT VISIT MOD MDM: CPT | Mod: 25

## 2024-11-22 PROCEDURE — 2500000004 HC RX 250 GENERAL PHARMACY W/ HCPCS (ALT 636 FOR OP/ED): Performed by: PHYSICIAN ASSISTANT

## 2024-11-22 PROCEDURE — 73521 X-RAY EXAM HIPS BI 2 VIEWS: CPT

## 2024-11-22 PROCEDURE — 72131 CT LUMBAR SPINE W/O DYE: CPT

## 2024-11-22 PROCEDURE — 73521 X-RAY EXAM HIPS BI 2 VIEWS: CPT | Mod: BILATERAL PROCEDURE | Performed by: STUDENT IN AN ORGANIZED HEALTH CARE EDUCATION/TRAINING PROGRAM

## 2024-11-22 PROCEDURE — 71046 X-RAY EXAM CHEST 2 VIEWS: CPT

## 2024-11-22 PROCEDURE — 72131 CT LUMBAR SPINE W/O DYE: CPT | Performed by: STUDENT IN AN ORGANIZED HEALTH CARE EDUCATION/TRAINING PROGRAM

## 2024-11-22 PROCEDURE — 71046 X-RAY EXAM CHEST 2 VIEWS: CPT | Performed by: STUDENT IN AN ORGANIZED HEALTH CARE EDUCATION/TRAINING PROGRAM

## 2024-11-22 PROCEDURE — 2500000005 HC RX 250 GENERAL PHARMACY W/O HCPCS: Performed by: PHYSICIAN ASSISTANT

## 2024-11-22 RX ORDER — LIDOCAINE 50 MG/G
1 PATCH TOPICAL DAILY
Qty: 5 PATCH | Refills: 0 | Status: SHIPPED | OUTPATIENT
Start: 2024-11-22

## 2024-11-22 RX ORDER — METHYLPREDNISOLONE 4 MG/1
TABLET ORAL
Qty: 21 TABLET | Refills: 0 | Status: SHIPPED | OUTPATIENT
Start: 2024-11-22

## 2024-11-22 RX ORDER — HYDROCODONE BITARTRATE AND ACETAMINOPHEN 5; 325 MG/1; MG/1
1 TABLET ORAL EVERY 6 HOURS PRN
Qty: 12 TABLET | Refills: 0 | Status: SHIPPED | OUTPATIENT
Start: 2024-11-22 | End: 2024-11-25

## 2024-11-22 RX ORDER — PREDNISONE 20 MG/1
60 TABLET ORAL ONCE
Status: COMPLETED | OUTPATIENT
Start: 2024-11-22 | End: 2024-11-22

## 2024-11-22 RX ORDER — LIDOCAINE 560 MG/1
2 PATCH PERCUTANEOUS; TOPICAL; TRANSDERMAL DAILY
Status: DISCONTINUED | OUTPATIENT
Start: 2024-11-22 | End: 2024-11-22 | Stop reason: HOSPADM

## 2024-11-22 ASSESSMENT — COLUMBIA-SUICIDE SEVERITY RATING SCALE - C-SSRS
2. HAVE YOU ACTUALLY HAD ANY THOUGHTS OF KILLING YOURSELF?: NO
6. HAVE YOU EVER DONE ANYTHING, STARTED TO DO ANYTHING, OR PREPARED TO DO ANYTHING TO END YOUR LIFE?: NO
1. IN THE PAST MONTH, HAVE YOU WISHED YOU WERE DEAD OR WISHED YOU COULD GO TO SLEEP AND NOT WAKE UP?: NO

## 2024-11-22 ASSESSMENT — PAIN DESCRIPTION - PAIN TYPE: TYPE: ACUTE PAIN

## 2024-11-22 ASSESSMENT — PAIN - FUNCTIONAL ASSESSMENT: PAIN_FUNCTIONAL_ASSESSMENT: 0-10

## 2024-11-22 ASSESSMENT — PAIN DESCRIPTION - LOCATION: LOCATION: BACK

## 2024-11-22 NOTE — DISCHARGE INSTRUCTIONS
Follow up with your spine doctor.  If still having issues may need seeing of thoracic spine as well.     Take medicine as prescribed

## 2024-11-22 NOTE — ED PROVIDER NOTES
HPI   Chief Complaint   Patient presents with    Numbness     Patient present to the emergency room c/o  left leg weakness that started x1 week ago. Patient states he has pain in bilateral side of back. Patient has a hx of hip replacement. Denies any falls or trauma.        HPI  This is a 68-year-old male presents with back pain leg weakness for the last week or so.  He is a .  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  He states he does have back issue and has had a history of hip replacement as well.  On Eliquis for A-fib.  He is denying any shortness of breath but states that when he was putting the pallets together he felt short of breath secondary to the pain.  Denying any chest pain.  Does have some back pain but mostly bilateral hip area. Denies  any saddle anesthesia or any incontinence.  Symptoms wax and wane.  They no nausea or vomiting has not really taken any medicine for this presents here by car as he drove himself.      Patient History   Past Medical History:   Diagnosis Date    Personal history of other medical treatment     History of cardiac monitoring    Personal history of other medical treatment     History of echocardiogram     No past surgical history on file.  No family history on file.  Social History     Tobacco Use    Smoking status: Never     Passive exposure: Never    Smokeless tobacco: Never   Vaping Use    Vaping status: Never Used   Substance Use Topics    Alcohol use: Never    Drug use: Never       Physical Exam   ED Triage Vitals [11/22/24 0957]   Temperature Heart Rate Respirations BP   36.3 °C (97.3 °F) 93 18 134/75      Pulse Ox Temp src Heart Rate Source Patient Position   (!) 93 % -- Monitor Sitting      BP Location FiO2 (%)     Right arm --       Physical Exam    Reviewed family history social history and allergies and were noncontributory to current problem.    Review of systems as noted in  history of present illness  otherwise negative. All other systems were reviewed and negative.     PMHX: Chronic conditions: reviewd in EMR, relevant history noted in HPI                Surgeries, hospitalizations: reviewed in EMR , relevant history noted in HPI                Medications: reviewed in EMR, relevant history noted in HPI                Allergies: reviewed in EMR, relevant history noted in HPI      PHYSICAL EXAM:    GENERAL/ CONSTITUTIONAL: Vitals noted, no distress. Alert and oriented  x 3. Non-toxic.  No Drooling or stridor .    HEAD: Normocephalic Atraumatic    EENT: TMs clear. Posterior oropharynx unremarkable. No meningismus. No LAD.  No exudate present.      EYES: PERRLA EOMI     NECK: Supple. Nontender. No midline tenderness.  Full range of motion    CARDIAC: Regular, rate, rhythm. No murmurs rubs or gallops. No JVD    PULMONARY: Lungs clear bilaterally with good aeration. No wheezes rales or rhonchi. No respiratory distress.     GI: Soft, . Nontender. No peritoneal signs. Normoactive bowel sounds. No pulsatile masses.  No guarding or rebound    EXTREMITIES/MUSCULOSKELTAL: No peripheral edema. Negative Homans bilaterally, NVIT, dorsal pedis pulses +2 /4 equal. FROM in all extremities and equal.  There is no symptoms of any compartment syndrome there is no redness or pallor.  Sensation is intact upper and lower extremity and equal.  He has full range of motion but somewhat less in the left secondary to pain he states.  His pain is coming from his back and his hip area.     SKIN: No rash. Intact.     NEURO: No focal neurologic deficits,     PSYCH: appropriate mood and affect    MEDICAL DECISION MAKING:      ED Course & MDM   ED Course as of 11/22/24 1209   Fri Nov 22, 2024   1204 Multilevel lumbar spondylosis, most pronounced at L4-L5 and L5-S1 as  described   [CV]   1205 No acute osseous abnormality.       [CV]   1205 No acute pulmonary process.       [CV]      ED Course User Index  [CV] Eva  YKLAH Shelton PA-C         Diagnoses as of 11/22/24 1209   Acute bilateral low back pain with bilateral sciatica     CT scan of back imaging of hip and chest showed above results.  Patient will be home-going on steroid pack to reduce inflammation and pain medicine as well.            No data recorded     [place] Coma Scale Score: 15 (11/22/24 0956 : Maki Mathis RN)                           Medical Decision Making  Homegoing on steroids and pain meds    Procedure  Procedures     Eva Shelton PA-C  11/22/24 1205

## 2024-11-22 NOTE — ED TRIAGE NOTES
Patient present to the emergency room c/o  left leg weakness that started x1 week ago. Patient states he has pain in bilateral side of back. Patient has a hx of hip replacement. Denies any falls or trauma.

## 2024-11-22 NOTE — Clinical Note
Navi Maria Doloresarpit was seen and treated in our emergency department on 11/22/2024.  He may return to work on 11/25/2024.       If you have any questions or concerns, please don't hesitate to call.      Eva Shelton PA-C

## 2024-11-26 ENCOUNTER — OFFICE VISIT (OUTPATIENT)
Dept: CARDIOLOGY | Facility: CLINIC | Age: 69
End: 2024-11-26
Payer: MEDICARE

## 2024-11-26 ENCOUNTER — HOSPITAL ENCOUNTER (OUTPATIENT)
Dept: RADIOLOGY | Facility: CLINIC | Age: 69
Discharge: HOME | End: 2024-11-26
Payer: MEDICARE

## 2024-11-26 ENCOUNTER — APPOINTMENT (OUTPATIENT)
Dept: CARDIOLOGY | Facility: CLINIC | Age: 69
End: 2024-11-26
Payer: MEDICARE

## 2024-11-26 ENCOUNTER — OFFICE VISIT (OUTPATIENT)
Dept: ORTHOPEDIC SURGERY | Facility: CLINIC | Age: 69
End: 2024-11-26
Payer: MEDICARE

## 2024-11-26 VITALS
DIASTOLIC BLOOD PRESSURE: 80 MMHG | BODY MASS INDEX: 34.54 KG/M2 | SYSTOLIC BLOOD PRESSURE: 122 MMHG | HEART RATE: 73 BPM | HEIGHT: 72 IN | OXYGEN SATURATION: 94 % | WEIGHT: 255 LBS

## 2024-11-26 DIAGNOSIS — I10 PRIMARY HYPERTENSION: Primary | ICD-10-CM

## 2024-11-26 DIAGNOSIS — E78.2 HYPERLIPIDEMIA, MIXED: ICD-10-CM

## 2024-11-26 DIAGNOSIS — I48.0 PAROXYSMAL ATRIAL FIBRILLATION (MULTI): ICD-10-CM

## 2024-11-26 DIAGNOSIS — M48.061 SPINAL STENOSIS OF LUMBAR REGION, UNSPECIFIED WHETHER NEUROGENIC CLAUDICATION PRESENT: ICD-10-CM

## 2024-11-26 DIAGNOSIS — M17.12 ARTHRITIS OF LEFT KNEE: ICD-10-CM

## 2024-11-26 DIAGNOSIS — M17.12 ARTHRITIS OF LEFT KNEE: Primary | ICD-10-CM

## 2024-11-26 DIAGNOSIS — I25.10 ATHEROSCLEROSIS OF NATIVE CORONARY ARTERY OF NATIVE HEART WITHOUT ANGINA PECTORIS: ICD-10-CM

## 2024-11-26 DIAGNOSIS — M54.32 SCIATICA OF LEFT SIDE: ICD-10-CM

## 2024-11-26 PROCEDURE — 99215 OFFICE O/P EST HI 40 MIN: CPT | Performed by: INTERNAL MEDICINE

## 2024-11-26 PROCEDURE — 1160F RVW MEDS BY RX/DR IN RCRD: CPT | Performed by: ORTHOPAEDIC SURGERY

## 2024-11-26 PROCEDURE — 1036F TOBACCO NON-USER: CPT | Performed by: ORTHOPAEDIC SURGERY

## 2024-11-26 PROCEDURE — 3074F SYST BP LT 130 MM HG: CPT | Performed by: INTERNAL MEDICINE

## 2024-11-26 PROCEDURE — 3079F DIAST BP 80-89 MM HG: CPT | Performed by: INTERNAL MEDICINE

## 2024-11-26 PROCEDURE — 3008F BODY MASS INDEX DOCD: CPT | Performed by: INTERNAL MEDICINE

## 2024-11-26 PROCEDURE — 1159F MED LIST DOCD IN RCRD: CPT | Performed by: ORTHOPAEDIC SURGERY

## 2024-11-26 PROCEDURE — 99214 OFFICE O/P EST MOD 30 MIN: CPT | Performed by: ORTHOPAEDIC SURGERY

## 2024-11-26 PROCEDURE — 73564 X-RAY EXAM KNEE 4 OR MORE: CPT | Mod: LT

## 2024-11-26 PROCEDURE — 1036F TOBACCO NON-USER: CPT | Performed by: INTERNAL MEDICINE

## 2024-11-26 PROCEDURE — 1159F MED LIST DOCD IN RCRD: CPT | Performed by: INTERNAL MEDICINE

## 2024-11-26 RX ORDER — CEFAZOLIN SODIUM 2 G/100ML
2 INJECTION, SOLUTION INTRAVENOUS ONCE
OUTPATIENT
Start: 2024-11-26 | End: 2024-11-26

## 2024-11-26 NOTE — LETTER
November 26, 2024     Aracely Motley DO  [address] [facility] [address]  [facility] [address]    Patient: Navi Valdez   YOB: 1955   Date of Visit: 11/26/2024       Dear Dr. Aracely Motley DO:    Thank you for referring Navi Valdez to me for evaluation. Below are my notes for this consultation.  If you have questions, please do not hesitate to call me. I look forward to following your patient along with you.       Sincerely,     Harish Marino MD      CC: Edison Seth MD  ______________________________________________________________________________________        [facility] Outpatient Follow-up Visit     Reason for Visit: AF, CAD     HPI: Navi Valdez is a 68 y.o.  male who presents today for followup.      The patient is a 68-year-old male with a history of hypertension and osteoarthritis who initially presented in August 2022 for elective hip replacement. During induction, the patient went into atrial fibrillation. This happened 2 separate times. The case was canceled and went into atrial fibrillation again during recovery. He presents today for follow-up. He denies any chest discomfort, shortness of breath, palpitations, lightheadedness, syncope, orthopnea, PND, lower extremity edema.      Prior twelve-lead ECG demonstrates sinus rhythm with PAC. Or telemetry demonstrates atrial fibrillation. Echocardiogram 10/14/2022 demonstrates an ejection fraction of 50%. Event recorder August to September 2022 demonstrates an 8 beat run of NSVT. Cardiac CTA 11/9/2022 with nonobstructive CAD, less than 50% mid to distal LAD stenosis limited by gating artifact. Less than 20% circumflex artery stenosis. Less than 25% RCA stenosis. 12/30/2022 , , HDL 44, triglycerides 107.  5/17/2024 ECG: Sinus rhythm first-degree AV block, otherwise normal.      Past Medical History:   He has a past medical history of Personal history of other medical treatment and Personal  history of other medical treatment.    Surgical History:   He has no past surgical history on file.    Family History:   No family history on file.      Allergies:  Patient has no known allergies.     Social History:    · No alcohol use   · No illicit drug use   · Non-smoker (V49.89) (Z78.9)    Prior Cardiovascular Testing (Personally Reviewed):     Review of Systems:  Review of Systems   All other systems reviewed and are negative.      Outpatient Medications:    Current Outpatient Medications:   •  acetaminophen (Tylenol 8 HOUR) 650 mg ER tablet, Take 1 tablet (650 mg) by mouth every 8 hours if needed for mild pain (1 - 3). Do not crush, chew, or split., Disp: , Rfl:   •  apixaban (Eliquis) 5 mg tablet, Take 1 tablet (5 mg) by mouth 2 times a day., Disp: 90 tablet, Rfl: 3  •  atorvastatin (Lipitor) 20 mg tablet, Take 1 tablet (20 mg) by mouth once daily., Disp: 90 tablet, Rfl: 3  •  hydroCHLOROthiazide (HYDRODiuril) 25 mg tablet, Take 1 tablet (25 mg) by mouth once daily., Disp: 90 tablet, Rfl: 3  •  krill-omega-3-dha-epa-lipids 928-31-53-50 mg capsule, Take by mouth., Disp: , Rfl:   •  lidocaine (Lidoderm) 5 % patch, Place 1 patch over 12 hours on the skin once daily. Remove & discard patch within 12 hours or as directed by MD., Disp: 5 patch, Rfl: 0  •  lisinopril 40 mg tablet, Take 1 tablet (40 mg) by mouth once daily., Disp: 90 tablet, Rfl: 3  •  metoprolol succinate XL (Toprol-XL) 100 mg 24 hr tablet, Take 1 tablet (100 mg) by mouth once daily., Disp: 90 tablet, Rfl: 3  •  triamcinolone (Kenalog) 0.1 % cream, Thin coat twice daily to affected areas on  for 3-4 weeks, then weekends only. Repeat every few months for flares., Disp: 80 g, Rfl: 3  •  methylPREDNISolone (Medrol Dospak) 4 mg tablets, Follow schedule on package instructions, Disp: 21 tablet, Rfl: 0     Last Recorded Vitals  /80 (BP Location: Left arm, Patient Position: Sitting, BP Cuff Size: Adult)   Pulse 73   Ht 1.829 m (6')   Wt 116 kg  "(255 lb)   SpO2 94%   BMI 34.58 kg/m²     Physical Exam:    Physical Exam  Vitals reviewed.   Constitutional:       Appearance: Normal appearance.   HENT:      Head: Normocephalic and atraumatic.      Mouth/Throat:      Mouth: Mucous membranes are moist.      Pharynx: Oropharynx is clear.   Eyes:      Extraocular Movements: Extraocular movements intact.      Conjunctiva/sclera: Conjunctivae normal.   Cardiovascular:      Rate and Rhythm: Normal rate and regular rhythm.      Pulses: Normal pulses.      Heart sounds: Normal heart sounds.   Pulmonary:      Effort: Pulmonary effort is normal.      Breath sounds: Normal breath sounds.   Abdominal:      General: Bowel sounds are normal.      Palpations: Abdomen is soft.   Musculoskeletal:         General: No swelling.      Cervical back: Neck supple.   Skin:     General: Skin is warm and dry.   Neurological:      General: No focal deficit present.      Mental Status: He is alert.   Psychiatric:         Mood and Affect: Mood normal.         Behavior: Behavior normal.         Lab/Radiology/Diagnostic Review:    Labs    Lab Results   Component Value Date    GLUCOSE 89 10/19/2024    CALCIUM 8.7 10/19/2024     10/19/2024    K 4.5 10/19/2024    CO2 30 10/19/2024     10/19/2024    BUN 25 (H) 10/19/2024    CREATININE 0.99 10/19/2024       Lab Results   Component Value Date    WBC 9.5 02/01/2023    HGB 11.5 (L) 02/01/2023    HCT 36.3 (L) 02/01/2023    MCV 92 02/01/2023     02/01/2023       Lab Results   Component Value Date    CHOL 175 12/30/2022     Lab Results   Component Value Date    HDL 44.4 12/30/2022     No results found for: \"LDLCALC\"  Lab Results   Component Value Date    TRIG 107 12/30/2022     No components found for: \"CHOLHDL\"    Lab Results   Component Value Date    BNP 67 10/19/2024       No results found for: \"TSH\"    Assessment:   Patient is asymptomatic from a cardiac standpoint.  He can complete greater than 4 METS of activity.  He is doing " acceptable risk for knee surgery from a cardiac standpoint.     Patient states he was not taking his beta-blocker on the day of his NSVT. This may be the etiology of his arrhythmia.  Patient will religiously take his beta-blocker. He will stay on Eliquis for atrial fibrillation.     Given his diagnosis of coronary artery disease based on his cardiac CTA with an LDL of 109, he will increase atorvastatin to 20 mg daily. He will try to modify his diet to decrease his LDL.     The metoprolol lisinopril.     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     Patient will follow up with us in 6 months or sooner if he has more problems.     [employer] Fax Number [phone]     Harish Marino MD

## 2024-11-26 NOTE — LETTER
November 26, 2024     Aracely Motley DO  [address] [facility] [address]  [facility] [address]    Patient: Navi Valdez   YOB: 1955   Date of Visit: 11/26/2024       Dear Dr. Aracely Motley DO:    Thank you for referring Navi Valdez to me for evaluation. Below are my notes for this consultation.  If you have questions, please do not hesitate to call me. I look forward to following your patient along with you.       Sincerely,     Edison Seth MD      CC: No Recipients  ______________________________________________________________________________________    68-year-old is seen with left knee pain.  Is been having persistent severe sharp shooting pain in the left knee that is worse with standing and walking.  Pain is worse going up and down stairs and getting up and down from a chair in and out of a car.  He was recently to the emergency room with back pain that has improved with a course of steroids.  He has a history of a left hip replacement.  He was also having numbness in the left leg and this has improved with the steroids.    Pleasant and no acute distress. Walks with antalgic gait. Stands with varus alignment of the left knee and neutral on the right. Left knee range of motion is 5-110°. The knee is stable to varus and valgus stress Lachman and posterior drawer. There is a mild effusion. There is generalized tenderness. Right knee range of motion is 0-120°. There is no effusion. The knee is stable to varus and valgus stress Lachman and posterior drawer. Both lower extremities are well perfused the skin is intact and muscle tone is adequate.  There is decreased flexibility of the lumbosacral spine.    Multiple x-ray views of the left knee are personally reviewed and these demonstrate advanced degenerative changes with complete loss of joint space and osteophyte formation and subchondral sclerosis and cystic change.    The patient has undergone extensive conservative  treatment but has persistent severe debilitating pain and advanced degenerative changes on x-ray.  Treatment options including no treatment reviewed and the decision was made to proceed with left total knee arthroplasty.  The surgery and postoperative course were reviewed in detail.  Risks including but not limited to infection thromboembolus neurovascular injury fracture bleeding medical problems stiffness and implant failure or loosening were discussed and they understand this and have elected to proceed.  They will have medical clearance.  Avoidance of aggravating activities will be done in the meantime.  Intravenous TXA will be used at the time of the procedure.  He will need cardiology clearance.  Will need to be able to stop Eliquis 3 days prior.  He did benefit from physical therapy for lumbar stenosis.  If the back pain or radicular symptoms returned then he would go to pain management for potential injection prior to surgery.

## 2024-11-26 NOTE — PROGRESS NOTES
68-year-old is seen with left knee pain.  Is been having persistent severe sharp shooting pain in the left knee that is worse with standing and walking.  Pain is worse going up and down stairs and getting up and down from a chair in and out of a car.  He was recently to the emergency room with back pain that has improved with a course of steroids.  He has a history of a left hip replacement.  He was also having numbness in the left leg and this has improved with the steroids.    Pleasant and no acute distress. Walks with antalgic gait. Stands with varus alignment of the left knee and neutral on the right. Left knee range of motion is 5-110°. The knee is stable to varus and valgus stress Lachman and posterior drawer. There is a mild effusion. There is generalized tenderness. Right knee range of motion is 0-120°. There is no effusion. The knee is stable to varus and valgus stress Lachman and posterior drawer. Both lower extremities are well perfused the skin is intact and muscle tone is adequate.  There is decreased flexibility of the lumbosacral spine.    Multiple x-ray views of the left knee are personally reviewed and these demonstrate advanced degenerative changes with complete loss of joint space and osteophyte formation and subchondral sclerosis and cystic change.    The patient has undergone extensive conservative treatment but has persistent severe debilitating pain and advanced degenerative changes on x-ray.  Treatment options including no treatment reviewed and the decision was made to proceed with left total knee arthroplasty.  The surgery and postoperative course were reviewed in detail.  Risks including but not limited to infection thromboembolus neurovascular injury fracture bleeding medical problems stiffness and implant failure or loosening were discussed and they understand this and have elected to proceed.  They will have medical clearance.  Avoidance of aggravating activities will be done in the  meantime.  Intravenous TXA will be used at the time of the procedure.  He will need cardiology clearance.  Will need to be able to stop Eliquis 3 days prior.  He did benefit from physical therapy for lumbar stenosis.  If the back pain or radicular symptoms returned then he would go to pain management for potential injection prior to surgery.

## 2024-11-26 NOTE — PROGRESS NOTES
[facility] Outpatient Follow-up Visit     Reason for Visit: AF, CAD     HPI: Navi Valdez is a 68 y.o.  male who presents today for followup.      The patient is a 68-year-old male with a history of hypertension and osteoarthritis who initially presented in August 2022 for elective hip replacement. During induction, the patient went into atrial fibrillation. This happened 2 separate times. The case was canceled and went into atrial fibrillation again during recovery. He presents today for follow-up. He denies any chest discomfort, shortness of breath, palpitations, lightheadedness, syncope, orthopnea, PND, lower extremity edema.      Prior twelve-lead ECG demonstrates sinus rhythm with PAC. Or telemetry demonstrates atrial fibrillation. Echocardiogram 10/14/2022 demonstrates an ejection fraction of 50%. Event recorder August to September 2022 demonstrates an 8 beat run of NSVT. Cardiac CTA 11/9/2022 with nonobstructive CAD, less than 50% mid to distal LAD stenosis limited by gating artifact. Less than 20% circumflex artery stenosis. Less than 25% RCA stenosis. 12/30/2022 , , HDL 44, triglycerides 107.  5/17/2024 ECG: Sinus rhythm first-degree AV block, otherwise normal.      Past Medical History:   He has a past medical history of Personal history of other medical treatment and Personal history of other medical treatment.    Surgical History:   He has no past surgical history on file.    Family History:   No family history on file.      Allergies:  Patient has no known allergies.     Social History:    · No alcohol use   · No illicit drug use   · Non-smoker (V49.89) (Z78.9)    Prior Cardiovascular Testing (Personally Reviewed):     Review of Systems:  Review of Systems   All other systems reviewed and are negative.      Outpatient Medications:    Current Outpatient Medications:     acetaminophen (Tylenol 8 HOUR) 650 mg ER tablet, Take 1 tablet (650 mg) by mouth every 8 hours if needed for  mild pain (1 - 3). Do not crush, chew, or split., Disp: , Rfl:     apixaban (Eliquis) 5 mg tablet, Take 1 tablet (5 mg) by mouth 2 times a day., Disp: 90 tablet, Rfl: 3    atorvastatin (Lipitor) 20 mg tablet, Take 1 tablet (20 mg) by mouth once daily., Disp: 90 tablet, Rfl: 3    hydroCHLOROthiazide (HYDRODiuril) 25 mg tablet, Take 1 tablet (25 mg) by mouth once daily., Disp: 90 tablet, Rfl: 3    krill-omega-3-dha-epa-lipids 901-47-18-50 mg capsule, Take by mouth., Disp: , Rfl:     lidocaine (Lidoderm) 5 % patch, Place 1 patch over 12 hours on the skin once daily. Remove & discard patch within 12 hours or as directed by MD., Disp: 5 patch, Rfl: 0    lisinopril 40 mg tablet, Take 1 tablet (40 mg) by mouth once daily., Disp: 90 tablet, Rfl: 3    metoprolol succinate XL (Toprol-XL) 100 mg 24 hr tablet, Take 1 tablet (100 mg) by mouth once daily., Disp: 90 tablet, Rfl: 3    triamcinolone (Kenalog) 0.1 % cream, Thin coat twice daily to affected areas on  for 3-4 weeks, then weekends only. Repeat every few months for flares., Disp: 80 g, Rfl: 3    methylPREDNISolone (Medrol Dospak) 4 mg tablets, Follow schedule on package instructions, Disp: 21 tablet, Rfl: 0     Last Recorded Vitals  /80 (BP Location: Left arm, Patient Position: Sitting, BP Cuff Size: Adult)   Pulse 73   Ht 1.829 m (6')   Wt 116 kg (255 lb)   SpO2 94%   BMI 34.58 kg/m²     Physical Exam:    Physical Exam  Vitals reviewed.   Constitutional:       Appearance: Normal appearance.   HENT:      Head: Normocephalic and atraumatic.      Mouth/Throat:      Mouth: Mucous membranes are moist.      Pharynx: Oropharynx is clear.   Eyes:      Extraocular Movements: Extraocular movements intact.      Conjunctiva/sclera: Conjunctivae normal.   Cardiovascular:      Rate and Rhythm: Normal rate and regular rhythm.      Pulses: Normal pulses.      Heart sounds: Normal heart sounds.   Pulmonary:      Effort: Pulmonary effort is normal.      Breath sounds: Normal  "breath sounds.   Abdominal:      General: Bowel sounds are normal.      Palpations: Abdomen is soft.   Musculoskeletal:         General: No swelling.      Cervical back: Neck supple.   Skin:     General: Skin is warm and dry.   Neurological:      General: No focal deficit present.      Mental Status: He is alert.   Psychiatric:         Mood and Affect: Mood normal.         Behavior: Behavior normal.         Lab/Radiology/Diagnostic Review:    Labs    Lab Results   Component Value Date    GLUCOSE 89 10/19/2024    CALCIUM 8.7 10/19/2024     10/19/2024    K 4.5 10/19/2024    CO2 30 10/19/2024     10/19/2024    BUN 25 (H) 10/19/2024    CREATININE 0.99 10/19/2024       Lab Results   Component Value Date    WBC 9.5 02/01/2023    HGB 11.5 (L) 02/01/2023    HCT 36.3 (L) 02/01/2023    MCV 92 02/01/2023     02/01/2023       Lab Results   Component Value Date    CHOL 175 12/30/2022     Lab Results   Component Value Date    HDL 44.4 12/30/2022     No results found for: \"LDLCALC\"  Lab Results   Component Value Date    TRIG 107 12/30/2022     No components found for: \"CHOLHDL\"    Lab Results   Component Value Date    BNP 67 10/19/2024       No results found for: \"TSH\"    Assessment:   Patient is asymptomatic from a cardiac standpoint.  He can complete greater than 4 METS of activity.  He is doing acceptable risk for knee surgery from a cardiac standpoint.     Patient states he was not taking his beta-blocker on the day of his NSVT. This may be the etiology of his arrhythmia.  Patient will religiously take his beta-blocker. He will stay on Eliquis for atrial fibrillation.     Given his diagnosis of coronary artery disease based on his cardiac CTA with an LDL of 109, he will increase atorvastatin to 20 mg daily. He will try to modify his diet to decrease his LDL.     The metoprolol lisinopril.     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     Patient will follow up with us in 6 months or sooner if he " has more problems.     [facility] [employer] [facility] Fax Number [phone]     Harish Marino MD

## 2024-11-26 NOTE — LETTER
November 26, 2024     Patient: Navi Valdez   YOB: 1955   Date of Visit: 11/26/2024       To Whom It May Concern:    Please excuse Navi Valdez  from work on 11/25/2024 and 11/26/2024. He may return to work on 11/27/2024 .    If you have any questions or concerns, please don't hesitate to call.         Sincerely,        Edison Seth MD    CC: No Recipients

## 2024-11-26 NOTE — LETTER
November 26, 2024     No Recipients    Patient: Navi Valdez   YOB: 1955   Date of Visit: 11/26/2024       Dear Dr. Malcolm Recipients:    Thank you for referring Navi Valdez to me for evaluation. Below are my notes for this consultation.  If you have questions, please do not hesitate to call me. I look forward to following your patient along with you.       Sincerely,     Harish Marino MD      CC: No Recipients  ______________________________________________________________________________________        [facility] Cardiology Outpatient Follow-up Visit     Reason for Visit: AF, CAD     HPI: Navi Valdez is a 68 y.o.  male who presents today for followup.      The patient is a 68-year-old male with a history of hypertension and osteoarthritis who initially presented in August 2022 for elective hip replacement. During induction, the patient went into atrial fibrillation. This happened 2 separate times. The case was canceled and went into atrial fibrillation again during recovery. He presents today for follow-up. He denies any chest discomfort, shortness of breath, palpitations, lightheadedness, syncope, orthopnea, PND, lower extremity edema.      Prior twelve-lead ECG demonstrates sinus rhythm with PAC. Or telemetry demonstrates atrial fibrillation. Echocardiogram 10/14/2022 demonstrates an ejection fraction of 50%. Event recorder August to September 2022 demonstrates an 8 beat run of NSVT. Cardiac CTA 11/9/2022 with nonobstructive CAD, less than 50% mid to distal LAD stenosis limited by gating artifact. Less than 20% circumflex artery stenosis. Less than 25% RCA stenosis. 12/30/2022 , , HDL 44, triglycerides 107.  5/17/2024 ECG: Sinus rhythm first-degree AV block, otherwise normal.      Past Medical History:   He has a past medical history of Personal history of other medical treatment and Personal history of other medical treatment.    Surgical History:   He has no past surgical history  on file.    Family History:   No family history on file.      Allergies:  Patient has no known allergies.     Social History:    · No alcohol use   · No illicit drug use   · Non-smoker (V49.89) (Z78.9)    Prior Cardiovascular Testing (Personally Reviewed):     Review of Systems:  Review of Systems   All other systems reviewed and are negative.      Outpatient Medications:    Current Outpatient Medications:   •  acetaminophen (Tylenol 8 HOUR) 650 mg ER tablet, Take 1 tablet (650 mg) by mouth every 8 hours if needed for mild pain (1 - 3). Do not crush, chew, or split., Disp: , Rfl:   •  apixaban (Eliquis) 5 mg tablet, Take 1 tablet (5 mg) by mouth 2 times a day., Disp: 90 tablet, Rfl: 3  •  atorvastatin (Lipitor) 20 mg tablet, Take 1 tablet (20 mg) by mouth once daily., Disp: 90 tablet, Rfl: 3  •  hydroCHLOROthiazide (HYDRODiuril) 25 mg tablet, Take 1 tablet (25 mg) by mouth once daily., Disp: 90 tablet, Rfl: 3  •  krill-omega-3-dha-epa-lipids 752-61-72-50 mg capsule, Take by mouth., Disp: , Rfl:   •  lidocaine (Lidoderm) 5 % patch, Place 1 patch over 12 hours on the skin once daily. Remove & discard patch within 12 hours or as directed by MD., Disp: 5 patch, Rfl: 0  •  lisinopril 40 mg tablet, Take 1 tablet (40 mg) by mouth once daily., Disp: 90 tablet, Rfl: 3  •  metoprolol succinate XL (Toprol-XL) 100 mg 24 hr tablet, Take 1 tablet (100 mg) by mouth once daily., Disp: 90 tablet, Rfl: 3  •  triamcinolone (Kenalog) 0.1 % cream, Thin coat twice daily to affected areas on  for 3-4 weeks, then weekends only. Repeat every few months for flares., Disp: 80 g, Rfl: 3  •  methylPREDNISolone (Medrol Dospak) 4 mg tablets, Follow schedule on package instructions, Disp: 21 tablet, Rfl: 0     Last Recorded Vitals  /80 (BP Location: Left arm, Patient Position: Sitting, BP Cuff Size: Adult)   Pulse 73   Ht 1.829 m (6')   Wt 116 kg (255 lb)   SpO2 94%   BMI 34.58 kg/m²     Physical Exam:    Physical Exam  Vitals reviewed.  "  Constitutional:       Appearance: Normal appearance.   HENT:      Head: Normocephalic and atraumatic.      Mouth/Throat:      Mouth: Mucous membranes are moist.      Pharynx: Oropharynx is clear.   Eyes:      Extraocular Movements: Extraocular movements intact.      Conjunctiva/sclera: Conjunctivae normal.   Cardiovascular:      Rate and Rhythm: Normal rate and regular rhythm.      Pulses: Normal pulses.      Heart sounds: Normal heart sounds.   Pulmonary:      Effort: Pulmonary effort is normal.      Breath sounds: Normal breath sounds.   Abdominal:      General: Bowel sounds are normal.      Palpations: Abdomen is soft.   Musculoskeletal:         General: No swelling.      Cervical back: Neck supple.   Skin:     General: Skin is warm and dry.   Neurological:      General: No focal deficit present.      Mental Status: He is alert.   Psychiatric:         Mood and Affect: Mood normal.         Behavior: Behavior normal.         Lab/Radiology/Diagnostic Review:    Labs    Lab Results   Component Value Date    GLUCOSE 89 10/19/2024    CALCIUM 8.7 10/19/2024     10/19/2024    K 4.5 10/19/2024    CO2 30 10/19/2024     10/19/2024    BUN 25 (H) 10/19/2024    CREATININE 0.99 10/19/2024       Lab Results   Component Value Date    WBC 9.5 02/01/2023    HGB 11.5 (L) 02/01/2023    HCT 36.3 (L) 02/01/2023    MCV 92 02/01/2023     02/01/2023       Lab Results   Component Value Date    CHOL 175 12/30/2022     Lab Results   Component Value Date    HDL 44.4 12/30/2022     No results found for: \"LDLCALC\"  Lab Results   Component Value Date    TRIG 107 12/30/2022     No components found for: \"CHOLHDL\"    Lab Results   Component Value Date    BNP 67 10/19/2024       No results found for: \"TSH\"    Assessment:   Patient is asymptomatic from a cardiac standpoint.  He can complete greater than 4 METS of activity.  He is doing acceptable risk for knee surgery from a cardiac standpoint.     Patient states he was not " taking his beta-blocker on the day of his NSVT. This may be the etiology of his arrhythmia.  Patient will religiously take his beta-blocker. He will stay on Eliquis for atrial fibrillation.     Given his diagnosis of coronary artery disease based on his cardiac CTA with an LDL of 109, he will increase atorvastatin to 20 mg daily. He will try to modify his diet to decrease his LDL.     The metoprolol lisinopril.     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     Patient will follow up with us in 6 months or sooner if he has more problems.     [employer] Fax Number [phone]     Harish Marino MD

## 2024-12-02 PROBLEM — M17.12 ARTHRITIS OF LEFT KNEE: Status: ACTIVE | Noted: 2024-11-26

## 2024-12-06 NOTE — H&P (VIEW-ONLY)
12/14/2024 MRI of the lumbar spine showed bone marrow edema and endplate changes worse at L4 and L5, severe degenerative changes L1-S1, spondylosis with:  laminectomy at the L5-S1 with scarring and fatty tissues with moderate to severe foraminal stenosis  Moderate to severe canal stenosis at L2-3 with ligamentum flavum hypertrophy with minimal bilateral foraminal stenosis  Severe canal stenosis at L3-4 with ligamentum flavum hypertrophy moderate to severe bilateral foraminal stenosis  Severe right L4-5 foraminal stenosis      FINDINGS:  There are 5 non rib-bearing lumbar vertebral bodies. The lowest  intervertebral disc will be labeled L5-S1.      Alignment: Dextroscoliosis and straightening of the normal lumbar  lordosis and stable grade 1 L3-4, L4-5 and L5-S1 retrolisthesis.  Grade 1 L2-3 anterolisthesis.      Vertebrae/Intervertebral Discs: Stable mild chronic wedging of the  superior L1 vertebral body. Otherwise, the vertebral bodies  demonstrate expected and height. No evidence of an acute fracture.  Nonspecific heterogeneous appearance of the marrow signal. Small  scattered benign intraosseous hemangiomas are seen. Multilevel disc  desiccation and disc height loss with endplate degenerative changes  and osteophytic spurring. Disc height loss most pronounced at L4-5.  Mild Modic type 1 changes involving the L3-L4 endplates.      Postsurgical changes from prior S1 laminectomy.      Conus medullaris: The lower thoracic cord appears unremarkable. The  conus medullaris terminates at L1.      T12-L1: Disc bulge, facet arthrosis and ligamentum flavum hypertrophy  without significant spinal canal stenosis. No significant right and  minimal left neuroforaminal stenosis.      L1-2: Disc bulge with osteophyte spurring, prominent dorsal epidural  fat, facet arthrosis and ligamentum flavum hypertrophy with mild  spinal canal stenosis. Mild bilateral neuroforaminal stenosis.      L2-3: Disc bulge eccentric extending into  the extraforaminal  compartment. There is osteophyte spurring. There is a left  extraforaminal annular fissure. Facet arthrosis, ligamentum flavum  hypertrophy and prominent epidural fat contributing to moderate to  severe spinal canal stenosis and mild to moderate left and mild right  neuroforaminal stenosis. There is effacement of the bilateral  subarticular recesses left worse than right with compression of the  traversing left-sided nerve root.      L3-4: Disc bulge asymmetric to the left, prominent epidural fat,  facet arthrosis and ligamentum flavum hypertrophy contributes to  severe spinal canal and moderate bilateral neuroforaminal stenosis,  left greater than right. There is effacement of the bilateral  subarticular recess with compression of the bilateral traversing  nerve roots.      L4-5: Postsurgical changes from prior laminectomy. There is a disc  osteophyte complex. There is facet arthrosis and prominent epidural  fat. There is mild spinal canal stenosis. There is effacement of the  bilateral subarticular recesses. Severe right and moderate left  neuroforaminal stenosis.      L5-S1: Postsurgical changes from prior right hemilaminectomy. There  is facet arthrosis. Epidural fat contributes to moderate effacement  of the thecal sac. There is effacement of the bilateral subarticular  recesses. Moderate to severe bilateral neuroforaminal stenosis.      Mild edema along the medial aspect of the left psoas muscle at the  level L3 (series 3, image 21). Otherwise, the prevertebral and  paraspinal soft tissues are unremarkable.      IMPRESSION:  1. Postsurgical changes from prior S1 laminectomy. There is mild  spinal canal stenosis at the L4-L5 level and effacement of the  bilateral subarticular recess. There is moderate effacement of the  thecal sac at L5-S1 level mostly secondary to prominent epidural  lipomatosis.  2. Additional multilevel degenerative changes of the lumbar spine  contributing to spinal  canal stenosis most pronounced at L2-L3, L3-L4  (severe) and neuroforaminal stenosis most pronounced at left L2-L3,  bilateral L3-4, L4-5 and L5-S1.  3. Mild edema within the medial aspect of the right psoas muscle at  level of L3 may be reactive, recommend correlation with patient's  symptomatology and laboratory values to exclude an early infectious  process.      I personally reviewed the images/study and I agree with the findings  as stated by Resident Sade Curran.      MACRO:  Critical Finding:  See findings. Notification was initiated on  12/16/2024 at 7:31 pm by  Sade Currna.  (**-YCF-**)      Signed by: Elva Novoa 12/17/2024 6:33 AM    History of Present Complaint:  The patient was referred to us by Referring Provider: Dr. Seth. this is 68 y.o.  male with a past history of obesity BMI 35, hypertension, CAD, atrial fibrillation on Eliquis, left SANDRA, multiple arthritis in knees and hips.  Patient has history of L5-S1 laminectomy in [facility] at [facility] presenting with with significant lower back pain with major claudication in both lower extremities.  The patient walks only for 10 minutes and his legs give out on him.  Patient has no incontinence no saddle paresthesia no paralysis.  The patient works as a  and he is very busy every day does not come back home until in the evening and he cannot find place to do physical therapy.  I am going to give him brochures about core exercises and stretching exercises for the back to help him.  The patient rates his pain as 6/10 and easily goes up to 10/10 just by standing or walking      Procedures:   None    Portions of record reviewed for pertinent issues: active problem list, medication list, allergies, family history, social history, notes from last encounter, encounters, lab results, imaging and other available records.    I have personally reviewed the [facility] report for this patient. This report is scanned into the electronic medical record.  I have considered the risks of abuse, dependence, addiction and diversion. It showed: Oxycodone 5 mg with tramadol 50 mg last year but minimal this year  OPIOID RISK ASSESSMENT SCORE 0/26  Aberrant behavior: None  My patient has no underlying substance abuse or alcohol abuse and there's no mental health conditions contributing to the patient's pain.        Diagnostic studies:  11/26/2024 left knee x-ray showed end-stage arthritis  11/22/2024 CT lumbar spine severe degenerative changes with moderate to severe multiple canal and foraminal stenosis worse at L4-5 and L5-S1:  Intervertebral Disc Spaces: Varying degrees of multilevel disc space  narrowing severe at L4-L5, moderate at L1-L2 and mild at the  remaining lumbar spine levels.      Degenerative change:      T12-L1:  There is no significant spinal canal stenosis or neural  foraminal narrowing.      L1-2:  There is no significant spinal canal or neural foraminal  narrowing.      L2-3:  There is moderate to severe left-sided neural foraminal  narrowing and moderate canal stenosis due to broad-based disc bulge.      L3-4:  There is moderate to severe bilateral neural foraminal  narrowing with moderate to severe canal stenosis due to broad-based  disc bulge and degenerative retrolisthesis.      L4-5:  There is severe right and moderate left neural foraminal  narrowing due to broad-based disc bulge. Moderate spinal canal  stenosis due to degenerative retrolisthesis.      L5-S1:  There is severe bilateral neural foraminal narrowing due to  broad-based disc bulge. No significant spinal canal stenosis.      Prevertebral/Paraspinal Soft Tissues: Unremarkable.      IMPRESSION:  Multilevel lumbar spondylosis, most pronounced at L4-L5 and L5-S1 as  described.          Employment/disability/litigation:     Social History:  with 3 children 12 grandchildren and 2 great-grandchildren denies smoking drinking or use of illicit drugs      Review of Systems    HENT: Negative.     Eyes: Negative.    Respiratory: Negative.     Cardiovascular: Negative.    Gastrointestinal: Negative.    Endocrine: Negative.    Genitourinary: Negative.    Musculoskeletal:  Positive for back pain and myalgias.   Skin: Negative.    Neurological:         Weakness in the legs just by walking 10 minutes   Hematological: Negative.    Psychiatric/Behavioral: Negative.            Physical Exam  Vitals and nursing note reviewed.   Constitutional:       Appearance: Normal appearance.       HENT:      Head: Normocephalic and atraumatic.      Nose: Nose normal.   Eyes:      Extraocular Movements: Extraocular movements intact.      Conjunctiva/sclera: Conjunctivae normal.      Pupils: Pupils are equal, round, and reactive to light.   Cardiovascular:      Rate and Rhythm: Normal rate and regular rhythm.      Pulses: Normal pulses.      Heart sounds: Normal heart sounds.   Pulmonary:      Effort: Pulmonary effort is normal.      Breath sounds: Normal breath sounds.   Abdominal:      General: Abdomen is flat. Bowel sounds are normal.      Palpations: Abdomen is soft.   Musculoskeletal:         General: Tenderness present.      Comments: Patient walks with a hunched back typical shopping cart sign   Skin:     General: Skin is warm.   Neurological:      Mental Status: He is alert.      Cranial Nerves: Cranial nerves 2-12 are intact.      Motor: Motor function is intact.      Coordination: Coordination is intact.      Gait: Gait abnormal.      Comments: Loss of bilateral ankle reflexes  Loss of right patellar reflexe  Loss of vibratory sensation in the right foot   Psychiatric:         Mood and Affect: Mood normal.         Behavior: Behavior normal.            Assessment  Very pleasant 68 years old gentleman with significant history of lumbar laminectomy at the L5 in the 70s who was doing very well until recently for the last year or so and now for the last few weeks he is miserable in pain barely could stand  up or stretch his back.  He is still working full-time and he cannot take the time off he is hoping to get the time off for his knee replacement in this January.  I gave him brochures about back exercises and core exercises in addition to stretching exercises.  In the meantime we will plan on caudal epidural steroid injection after lorazepam.  Above all the patient exam showing significant neurological deficit affecting the right leg and I highly recommend immediate MRI of his lumbar spine to evaluate for targeted injection or possible referral to surgery.           Plan  At least 50% of the visit was involved in the discussion of the options for treatment. We discussed exercises, medication, interventional therapies and surgery. Healthy life style is essential with patient hard work to achieve the wellness. In addition; discussion with the patient and/or family about any of the diagnostic results, impressions and/or recommended diagnostic studies, prognosis, risks and benefits of treatment options, instructions for treatment and/or follow-up, importance of compliance with chosen treatment options, risk-factor reduction, and patient/family education.           Recommended Pool therapy, walking in the pool, at least 3x per week for 30 minutes  Recommend self-directed physical therapy with at least daily exercises for minimum of 20-minute, brochure was handed to the patient  Core exercises and stretching exercises were given to the patient  MRI of the lumbar spine ASAP  Caudal epidural steroid injection after lorazepam  Healthy lifestyle and anti-inflammatory diet in addition to weight control discussed with the patient  Alternative chronic pain therapies was discussed, encouraged and information was handed  Return to Clinic after injection     *Please note this report has been produced using speech recognition software and may contain errors related to that system including grammar, punctuation and spelling as well  as words and phrases that may be inappropriate. If there are questions or concerns, please feel free to contact me to clarify.    Amee Linton MD

## 2024-12-06 NOTE — PROGRESS NOTES
12/14/2024 MRI of the lumbar spine showed bone marrow edema and endplate changes worse at L4 and L5, severe degenerative changes L1-S1, spondylosis with:  laminectomy at the L5-S1 with scarring and fatty tissues with moderate to severe foraminal stenosis  Moderate to severe canal stenosis at L2-3 with ligamentum flavum hypertrophy with minimal bilateral foraminal stenosis  Severe canal stenosis at L3-4 with ligamentum flavum hypertrophy moderate to severe bilateral foraminal stenosis  Severe right L4-5 foraminal stenosis      FINDINGS:  There are 5 non rib-bearing lumbar vertebral bodies. The lowest  intervertebral disc will be labeled L5-S1.      Alignment: Dextroscoliosis and straightening of the normal lumbar  lordosis and stable grade 1 L3-4, L4-5 and L5-S1 retrolisthesis.  Grade 1 L2-3 anterolisthesis.      Vertebrae/Intervertebral Discs: Stable mild chronic wedging of the  superior L1 vertebral body. Otherwise, the vertebral bodies  demonstrate expected and height. No evidence of an acute fracture.  Nonspecific heterogeneous appearance of the marrow signal. Small  scattered benign intraosseous hemangiomas are seen. Multilevel disc  desiccation and disc height loss with endplate degenerative changes  and osteophytic spurring. Disc height loss most pronounced at L4-5.  Mild Modic type 1 changes involving the L3-L4 endplates.      Postsurgical changes from prior S1 laminectomy.      Conus medullaris: The lower thoracic cord appears unremarkable. The  conus medullaris terminates at L1.      T12-L1: Disc bulge, facet arthrosis and ligamentum flavum hypertrophy  without significant spinal canal stenosis. No significant right and  minimal left neuroforaminal stenosis.      L1-2: Disc bulge with osteophyte spurring, prominent dorsal epidural  fat, facet arthrosis and ligamentum flavum hypertrophy with mild  spinal canal stenosis. Mild bilateral neuroforaminal stenosis.      L2-3: Disc bulge eccentric extending into  the extraforaminal  compartment. There is osteophyte spurring. There is a left  extraforaminal annular fissure. Facet arthrosis, ligamentum flavum  hypertrophy and prominent epidural fat contributing to moderate to  severe spinal canal stenosis and mild to moderate left and mild right  neuroforaminal stenosis. There is effacement of the bilateral  subarticular recesses left worse than right with compression of the  traversing left-sided nerve root.      L3-4: Disc bulge asymmetric to the left, prominent epidural fat,  facet arthrosis and ligamentum flavum hypertrophy contributes to  severe spinal canal and moderate bilateral neuroforaminal stenosis,  left greater than right. There is effacement of the bilateral  subarticular recess with compression of the bilateral traversing  nerve roots.      L4-5: Postsurgical changes from prior laminectomy. There is a disc  osteophyte complex. There is facet arthrosis and prominent epidural  fat. There is mild spinal canal stenosis. There is effacement of the  bilateral subarticular recesses. Severe right and moderate left  neuroforaminal stenosis.      L5-S1: Postsurgical changes from prior right hemilaminectomy. There  is facet arthrosis. Epidural fat contributes to moderate effacement  of the thecal sac. There is effacement of the bilateral subarticular  recesses. Moderate to severe bilateral neuroforaminal stenosis.      Mild edema along the medial aspect of the left psoas muscle at the  level L3 (series 3, image 21). Otherwise, the prevertebral and  paraspinal soft tissues are unremarkable.      IMPRESSION:  1. Postsurgical changes from prior S1 laminectomy. There is mild  spinal canal stenosis at the L4-L5 level and effacement of the  bilateral subarticular recess. There is moderate effacement of the  thecal sac at L5-S1 level mostly secondary to prominent epidural  lipomatosis.  2. Additional multilevel degenerative changes of the lumbar spine  contributing to spinal  canal stenosis most pronounced at L2-L3, L3-L4  (severe) and neuroforaminal stenosis most pronounced at left L2-L3,  bilateral L3-4, L4-5 and L5-S1.  3. Mild edema within the medial aspect of the right psoas muscle at  level of L3 may be reactive, recommend correlation with patient's  symptomatology and laboratory values to exclude an early infectious  process.      I personally reviewed the images/study and I agree with the findings  as stated by Resident Sade Curran.      MACRO:  Critical Finding:  See findings. Notification was initiated on  12/16/2024 at 7:31 pm by  Sade Curran.  (**-YCF-**)      Signed by: Elva Novoa 12/17/2024 6:33 AM    History of Present Complaint:  The patient was referred to us by Referring Provider: Dr. Seth. this is 68 y.o.  male with a past history of obesity BMI 35, hypertension, CAD, atrial fibrillation on Eliquis, left SANDRA, multiple arthritis in knees and hips.  Patient has history of L5-S1 laminectomy in 1979 at [facility] presenting with with significant lower back pain with major claudication in both lower extremities.  The patient walks only for 10 minutes and his legs give out on him.  Patient has no incontinence no saddle paresthesia no paralysis.  The patient works as a  and he is very busy every day does not come back home until in the evening and he cannot find place to do physical therapy.  I am going to give him brochures about core exercises and stretching exercises for the back to help him.  The patient rates his pain as 6/10 and easily goes up to 10/10 just by standing or walking      Procedures:   None    Portions of record reviewed for pertinent issues: active problem list, medication list, allergies, family history, social history, notes from last encounter, encounters, lab results, imaging and other available records.    I have personally reviewed the OARRS report for this patient. This report is scanned into the electronic medical record.  I have considered the risks of abuse, dependence, addiction and diversion. It showed: Oxycodone 5 mg with tramadol 50 mg last year but minimal this year  OPIOID RISK ASSESSMENT SCORE 0/26  Aberrant behavior: None  My patient has no underlying substance abuse or alcohol abuse and there's no mental health conditions contributing to the patient's pain.        Diagnostic studies:  11/26/2024 left knee x-ray showed end-stage arthritis  11/22/2024 CT lumbar spine severe degenerative changes with moderate to severe multiple canal and foraminal stenosis worse at L4-5 and L5-S1:  Intervertebral Disc Spaces: Varying degrees of multilevel disc space  narrowing severe at L4-L5, moderate at L1-L2 and mild at the  remaining lumbar spine levels.      Degenerative change:      T12-L1:  There is no significant spinal canal stenosis or neural  foraminal narrowing.      L1-2:  There is no significant spinal canal or neural foraminal  narrowing.      L2-3:  There is moderate to severe left-sided neural foraminal  narrowing and moderate canal stenosis due to broad-based disc bulge.      L3-4:  There is moderate to severe bilateral neural foraminal  narrowing with moderate to severe canal stenosis due to broad-based  disc bulge and degenerative retrolisthesis.      L4-5:  There is severe right and moderate left neural foraminal  narrowing due to broad-based disc bulge. Moderate spinal canal  stenosis due to degenerative retrolisthesis.      L5-S1:  There is severe bilateral neural foraminal narrowing due to  broad-based disc bulge. No significant spinal canal stenosis.      Prevertebral/Paraspinal Soft Tissues: Unremarkable.      IMPRESSION:  Multilevel lumbar spondylosis, most pronounced at L4-L5 and L5-S1 as  described.          Employment/disability/litigation:     Social History:  with 3 children 12 grandchildren and 2 great-grandchildren denies smoking drinking or use of illicit drugs      Review of Systems    HENT: Negative.     Eyes: Negative.    Respiratory: Negative.     Cardiovascular: Negative.    Gastrointestinal: Negative.    Endocrine: Negative.    Genitourinary: Negative.    Musculoskeletal:  Positive for back pain and myalgias.   Skin: Negative.    Neurological:         Weakness in the legs just by walking 10 minutes   Hematological: Negative.    Psychiatric/Behavioral: Negative.            Physical Exam  Vitals and nursing note reviewed.   Constitutional:       Appearance: Normal appearance.       HENT:      Head: Normocephalic and atraumatic.      Nose: Nose normal.   Eyes:      Extraocular Movements: Extraocular movements intact.      Conjunctiva/sclera: Conjunctivae normal.      Pupils: Pupils are equal, round, and reactive to light.   Cardiovascular:      Rate and Rhythm: Normal rate and regular rhythm.      Pulses: Normal pulses.      Heart sounds: Normal heart sounds.   Pulmonary:      Effort: Pulmonary effort is normal.      Breath sounds: Normal breath sounds.   Abdominal:      General: Abdomen is flat. Bowel sounds are normal.      Palpations: Abdomen is soft.   Musculoskeletal:         General: Tenderness present.      Comments: Patient walks with a hunched back typical shopping cart sign   Skin:     General: Skin is warm.   Neurological:      Mental Status: He is alert.      Cranial Nerves: Cranial nerves 2-12 are intact.      Motor: Motor function is intact.      Coordination: Coordination is intact.      Gait: Gait abnormal.      Comments: Loss of bilateral ankle reflexes  Loss of right patellar reflexe  Loss of vibratory sensation in the right foot   Psychiatric:         Mood and Affect: Mood normal.         Behavior: Behavior normal.            Assessment  Very pleasant 68 years old gentleman with significant history of lumbar laminectomy at the L5 in the 70s who was doing very well until recently for the last year or so and now for the last few weeks he is miserable in pain barely could stand  up or stretch his back.  He is still working full-time and he cannot take the time off he is hoping to get the time off for his knee replacement in this January.  I gave him brochures about back exercises and core exercises in addition to stretching exercises.  In the meantime we will plan on caudal epidural steroid injection after lorazepam.  Above all the patient exam showing significant neurological deficit affecting the right leg and I highly recommend immediate MRI of his lumbar spine to evaluate for targeted injection or possible referral to surgery.           Plan  At least 50% of the visit was involved in the discussion of the options for treatment. We discussed exercises, medication, interventional therapies and surgery. Healthy life style is essential with patient hard work to achieve the wellness. In addition; discussion with the patient and/or family about any of the diagnostic results, impressions and/or recommended diagnostic studies, prognosis, risks and benefits of treatment options, instructions for treatment and/or follow-up, importance of compliance with chosen treatment options, risk-factor reduction, and patient/family education.           Recommended Pool therapy, walking in the pool, at least 3x per week for 30 minutes  Recommend self-directed physical therapy with at least daily exercises for minimum of 20-minute, brochure was handed to the patient  Core exercises and stretching exercises were given to the patient  MRI of the lumbar spine ASAP  Caudal epidural steroid injection after lorazepam  Healthy lifestyle and anti-inflammatory diet in addition to weight control discussed with the patient  Alternative chronic pain therapies was discussed, encouraged and information was handed  Return to Clinic after injection     *Please note this report has been produced using speech recognition software and may contain errors related to that system including grammar, punctuation and spelling as well  as words and phrases that may be inappropriate. If there are questions or concerns, please feel free to contact me to clarify.    Amee Linton MD

## 2024-12-10 ENCOUNTER — OFFICE VISIT (OUTPATIENT)
Dept: PAIN MEDICINE | Facility: CLINIC | Age: 69
End: 2024-12-10
Payer: MEDICARE

## 2024-12-10 VITALS
TEMPERATURE: 97 F | DIASTOLIC BLOOD PRESSURE: 84 MMHG | RESPIRATION RATE: 16 BRPM | HEART RATE: 76 BPM | HEIGHT: 72 IN | OXYGEN SATURATION: 96 % | WEIGHT: 256.2 LBS | BODY MASS INDEX: 34.7 KG/M2 | SYSTOLIC BLOOD PRESSURE: 135 MMHG

## 2024-12-10 DIAGNOSIS — M48.062 LUMBAR STENOSIS WITH NEUROGENIC CLAUDICATION: ICD-10-CM

## 2024-12-10 DIAGNOSIS — M47.27 LUMBOSACRAL SPONDYLOSIS WITH RADICULOPATHY: ICD-10-CM

## 2024-12-10 DIAGNOSIS — M96.1 POSTLAMINECTOMY SYNDROME OF LUMBAR REGION: Primary | ICD-10-CM

## 2024-12-10 PROCEDURE — 3075F SYST BP GE 130 - 139MM HG: CPT | Performed by: ANESTHESIOLOGY

## 2024-12-10 PROCEDURE — 3008F BODY MASS INDEX DOCD: CPT | Performed by: ANESTHESIOLOGY

## 2024-12-10 PROCEDURE — 1125F AMNT PAIN NOTED PAIN PRSNT: CPT | Performed by: ANESTHESIOLOGY

## 2024-12-10 PROCEDURE — 1159F MED LIST DOCD IN RCRD: CPT | Performed by: ANESTHESIOLOGY

## 2024-12-10 PROCEDURE — 3079F DIAST BP 80-89 MM HG: CPT | Performed by: ANESTHESIOLOGY

## 2024-12-10 PROCEDURE — 99214 OFFICE O/P EST MOD 30 MIN: CPT | Performed by: ANESTHESIOLOGY

## 2024-12-10 PROCEDURE — 99204 OFFICE O/P NEW MOD 45 MIN: CPT | Performed by: ANESTHESIOLOGY

## 2024-12-10 RX ORDER — LORAZEPAM 2 MG/1
TABLET ORAL
Qty: 1 TABLET | Refills: 0 | Status: SHIPPED | OUTPATIENT
Start: 2024-12-10

## 2024-12-10 ASSESSMENT — COLUMBIA-SUICIDE SEVERITY RATING SCALE - C-SSRS
1. IN THE PAST MONTH, HAVE YOU WISHED YOU WERE DEAD OR WISHED YOU COULD GO TO SLEEP AND NOT WAKE UP?: NO
2. HAVE YOU ACTUALLY HAD ANY THOUGHTS OF KILLING YOURSELF?: NO
6. HAVE YOU EVER DONE ANYTHING, STARTED TO DO ANYTHING, OR PREPARED TO DO ANYTHING TO END YOUR LIFE?: NO

## 2024-12-10 ASSESSMENT — ENCOUNTER SYMPTOMS
ENDOCRINE NEGATIVE: 1
EYES NEGATIVE: 1
BACK PAIN: 1
PSYCHIATRIC NEGATIVE: 1
GASTROINTESTINAL NEGATIVE: 1
DEPRESSION: 0
LOSS OF SENSATION IN FEET: 1
RESPIRATORY NEGATIVE: 1
HEMATOLOGIC/LYMPHATIC NEGATIVE: 1
OCCASIONAL FEELINGS OF UNSTEADINESS: 1
MYALGIAS: 1
CARDIOVASCULAR NEGATIVE: 1

## 2024-12-10 ASSESSMENT — PATIENT HEALTH QUESTIONNAIRE - PHQ9
SUM OF ALL RESPONSES TO PHQ9 QUESTIONS 1 AND 2: 0
1. LITTLE INTEREST OR PLEASURE IN DOING THINGS: NOT AT ALL
2. FEELING DOWN, DEPRESSED OR HOPELESS: NOT AT ALL

## 2024-12-10 ASSESSMENT — LIFESTYLE VARIABLES: TOTAL SCORE: 0

## 2024-12-10 ASSESSMENT — PAIN SCALES - GENERAL: PAINLEVEL_OUTOF10: 9

## 2024-12-10 NOTE — PROGRESS NOTES
Chief Complaint   Patient presents with    Back Pain     9/10 Low back pain down bilateral legs to feet that go numb.     History of Present Complaint:  The patient was referred to us by Referring Provider: Dr Garcia. this is 68 y.o.  male   with a past history of  this is 68 y.o.  male with a past history of obesity BMI 35, hypertension, CAD, atrial fibrillation on Eliquis, left SANDRA, multiple arthritis in knees and hips presenting with Lower back pain with radiculopathy      Pain started 1 month ago due to  Over strain  Pain is The same unchanged   Patient history significant for the following red flags:  on Eliquis for A-fib  The pain is described as achiness, sharp, and soreness and is relieved by Sitting      Prior Pain Therapies:  ER Vicodin helped  Past surgical history:  Lumbar fusion 1978    Past medical history:  as above    Work Hx/litigation:  NO  Social history:  and Has 3children, 12grandchildren and 2great-grandchildren    Diagnostic studies: CT scan films Nov 20 th ER

## 2024-12-10 NOTE — LETTER
December 10, 2024     Edison Seth MD  [address]  [facility], [address]  [facility] [address]    Patient: Navi Valdez   YOB: 1955   Date of Visit: 12/10/2024       Dear Dr. Edison Seth MD:    Thank you for referring Navi Valdez to me for evaluation. Below are my notes for this consultation.  If you have questions, please do not hesitate to call me. I look forward to following your patient along with you.       Sincerely,     Amee Linton MD      CC: No Recipients  ______________________________________________________________________________________    History of Present Complaint:  The patient was referred to us by Referring Provider: Dr. Seth. this is 68 y.o.  male with a past history of obesity BMI 35, hypertension, CAD, atrial fibrillation on Eliquis, left SANDRA, multiple arthritis in knees and hips.  Patient has history of L5-S1 laminectomy in 1979 at [facility] presenting with with significant lower back pain with major claudication in both lower extremities.  The patient walks only for 10 minutes and his legs give out on him.  Patient has no incontinence no saddle paresthesia no paralysis.  The patient works as a  and he is very busy every day does not come back home until in the evening and he cannot find place to do physical therapy.  I am going to give him brochures about core exercises and stretching exercises for the back to help him.  The patient rates his pain as 6/10 and easily goes up to 10/10 just by standing or walking      Procedures:   None    Portions of record reviewed for pertinent issues: active problem list, medication list, allergies, family history, social history, notes from last encounter, encounters, lab results, imaging and other available records.    I have personally reviewed the OARRS report for this patient. This report is scanned into the electronic medical record. I have considered the risks of abuse, dependence,  addiction and diversion. It showed: Oxycodone 5 mg with tramadol 50 mg last year but minimal this year  OPIOID RISK ASSESSMENT SCORE 0/26  Aberrant behavior: None  My patient has no underlying substance abuse or alcohol abuse and there's no mental health conditions contributing to the patient's pain.        Diagnostic studies:  11/26/2024 left knee x-ray showed end-stage arthritis  11/22/2024 CT lumbar spine severe degenerative changes with moderate to severe multiple canal and foraminal stenosis worse at L4-5 and L5-S1:  Intervertebral Disc Spaces: Varying degrees of multilevel disc space  narrowing severe at L4-L5, moderate at L1-L2 and mild at the  remaining lumbar spine levels.      Degenerative change:      T12-L1:  There is no significant spinal canal stenosis or neural  foraminal narrowing.      L1-2:  There is no significant spinal canal or neural foraminal  narrowing.      L2-3:  There is moderate to severe left-sided neural foraminal  narrowing and moderate canal stenosis due to broad-based disc bulge.      L3-4:  There is moderate to severe bilateral neural foraminal  narrowing with moderate to severe canal stenosis due to broad-based  disc bulge and degenerative retrolisthesis.      L4-5:  There is severe right and moderate left neural foraminal  narrowing due to broad-based disc bulge. Moderate spinal canal  stenosis due to degenerative retrolisthesis.      L5-S1:  There is severe bilateral neural foraminal narrowing due to  broad-based disc bulge. No significant spinal canal stenosis.      Prevertebral/Paraspinal Soft Tissues: Unremarkable.      IMPRESSION:  Multilevel lumbar spondylosis, most pronounced at L4-L5 and L5-S1 as  described.          Employment/disability/litigation:     Social History:  with 3 children 12 grandchildren and 2 great-grandchildren denies smoking drinking or use of illicit drugs      Review of Systems   HENT: Negative.     Eyes: Negative.    Respiratory:  Negative.     Cardiovascular: Negative.    Gastrointestinal: Negative.    Endocrine: Negative.    Genitourinary: Negative.    Musculoskeletal:  Positive for back pain and myalgias.   Skin: Negative.    Neurological:         Weakness in the legs just by walking 10 minutes   Hematological: Negative.    Psychiatric/Behavioral: Negative.            Physical Exam  Vitals and nursing note reviewed.   Constitutional:       Appearance: Normal appearance.       HENT:      Head: Normocephalic and atraumatic.      Nose: Nose normal.   Eyes:      Extraocular Movements: Extraocular movements intact.      Conjunctiva/sclera: Conjunctivae normal.      Pupils: Pupils are equal, round, and reactive to light.   Cardiovascular:      Rate and Rhythm: Normal rate and regular rhythm.      Pulses: Normal pulses.      Heart sounds: Normal heart sounds.   Pulmonary:      Effort: Pulmonary effort is normal.      Breath sounds: Normal breath sounds.   Abdominal:      General: Abdomen is flat. Bowel sounds are normal.      Palpations: Abdomen is soft.   Musculoskeletal:         General: Tenderness present.      Comments: Patient walks with a hunched back typical shopping cart sign   Skin:     General: Skin is warm.   Neurological:      Mental Status: He is alert.      Cranial Nerves: Cranial nerves 2-12 are intact.      Motor: Motor function is intact.      Coordination: Coordination is intact.      Gait: Gait abnormal.      Comments: Loss of bilateral ankle reflexes  Loss of right patellar reflexe  Loss of vibratory sensation in the right foot   Psychiatric:         Mood and Affect: Mood normal.         Behavior: Behavior normal.            Assessment  Very pleasant 68 years old gentleman with significant history of lumbar laminectomy at the L5 in the 70s who was doing very well until recently for the last year or so and now for the last few weeks he is miserable in pain barely could stand up or stretch his back.  He is still working  full-time and he cannot take the time off he is hoping to get the time off for his knee replacement in this January.  I gave him brochures about back exercises and core exercises in addition to stretching exercises.  In the meantime we will plan on caudal epidural steroid injection after lorazepam.  Above all the patient exam showing significant neurological deficit affecting the right leg and I highly recommend immediate MRI of his lumbar spine to evaluate for targeted injection or possible referral to surgery.           Plan  At least 50% of the visit was involved in the discussion of the options for treatment. We discussed exercises, medication, interventional therapies and surgery. Healthy life style is essential with patient hard work to achieve the wellness. In addition; discussion with the patient and/or family about any of the diagnostic results, impressions and/or recommended diagnostic studies, prognosis, risks and benefits of treatment options, instructions for treatment and/or follow-up, importance of compliance with chosen treatment options, risk-factor reduction, and patient/family education.           Recommended Pool therapy, walking in the pool, at least 3x per week for 30 minutes  Recommend self-directed physical therapy with at least daily exercises for minimum of 20-minute, brochure was handed to the patient  Core exercises and stretching exercises were given to the patient  MRI of the lumbar spine ASAP  Caudal epidural steroid injection after lorazepam  Healthy lifestyle and anti-inflammatory diet in addition to weight control discussed with the patient  Alternative chronic pain therapies was discussed, encouraged and information was handed  Return to Clinic after injection     *Please note this report has been produced using speech recognition software and may contain errors related to that system including grammar, punctuation and spelling as well as words and phrases that may be  inappropriate. If there are questions or concerns, please feel free to contact me to clarify.    Amee Linton MD

## 2024-12-10 NOTE — LETTER
December 10, 2024     Aracely Motley DO  [address] [facility] [address]  [facility] [address]    Patient: Navi Valdez   YOB: 1955   Date of Visit: 12/10/2024       Dear Dr. Aracely Motley DO:    Thank you for referring Navi Valdez to me for evaluation. Below are my notes for this consultation.  If you have questions, please do not hesitate to call me. I look forward to following your patient along with you.       Sincerely,     Amee Linton MD      CC: No Recipients  ______________________________________________________________________________________    History of Present Complaint:  The patient was referred to us by Referring Provider: Dr. Seth. this is 68 y.o.  male with a past history of obesity BMI 35, hypertension, CAD, atrial fibrillation on Eliquis, left SANDRA, multiple arthritis in knees and hips.  Patient has history of L5-S1 laminectomy in 1979 at [facility] presenting with with significant lower back pain with major claudication in both lower extremities.  The patient walks only for 10 minutes and his legs give out on him.  Patient has no incontinence no saddle paresthesia no paralysis.  The patient works as a  and he is very busy every day does not come back home until in the evening and he cannot find place to do physical therapy.  I am going to give him brochures about core exercises and stretching exercises for the back to help him.  The patient rates his pain as 6/10 and easily goes up to 10/10 just by standing or walking      Procedures:   None    Portions of record reviewed for pertinent issues: active problem list, medication list, allergies, family history, social history, notes from last encounter, encounters, lab results, imaging and other available records.    I have personally reviewed the OARRS report for this patient. This report is scanned into the electronic medical record. I have considered the risks of abuse, dependence,  addiction and diversion. It showed: Oxycodone 5 mg with tramadol 50 mg last year but minimal this year  OPIOID RISK ASSESSMENT SCORE 0/26  Aberrant behavior: None  My patient has no underlying substance abuse or alcohol abuse and there's no mental health conditions contributing to the patient's pain.        Diagnostic studies:  11/26/2024 left knee x-ray showed end-stage arthritis  11/22/2024 CT lumbar spine severe degenerative changes with moderate to severe multiple canal and foraminal stenosis worse at L4-5 and L5-S1:  Intervertebral Disc Spaces: Varying degrees of multilevel disc space  narrowing severe at L4-L5, moderate at L1-L2 and mild at the  remaining lumbar spine levels.      Degenerative change:      T12-L1:  There is no significant spinal canal stenosis or neural  foraminal narrowing.      L1-2:  There is no significant spinal canal or neural foraminal  narrowing.      L2-3:  There is moderate to severe left-sided neural foraminal  narrowing and moderate canal stenosis due to broad-based disc bulge.      L3-4:  There is moderate to severe bilateral neural foraminal  narrowing with moderate to severe canal stenosis due to broad-based  disc bulge and degenerative retrolisthesis.      L4-5:  There is severe right and moderate left neural foraminal  narrowing due to broad-based disc bulge. Moderate spinal canal  stenosis due to degenerative retrolisthesis.      L5-S1:  There is severe bilateral neural foraminal narrowing due to  broad-based disc bulge. No significant spinal canal stenosis.      Prevertebral/Paraspinal Soft Tissues: Unremarkable.      IMPRESSION:  Multilevel lumbar spondylosis, most pronounced at L4-L5 and L5-S1 as  described.          Employment/disability/litigation:     Social History:  with 3 children 12 grandchildren and 2 great-grandchildren denies smoking drinking or use of illicit drugs      Review of Systems   HENT: Negative.     Eyes: Negative.    Respiratory:  Negative.     Cardiovascular: Negative.    Gastrointestinal: Negative.    Endocrine: Negative.    Genitourinary: Negative.    Musculoskeletal:  Positive for back pain and myalgias.   Skin: Negative.    Neurological:         Weakness in the legs just by walking 10 minutes   Hematological: Negative.    Psychiatric/Behavioral: Negative.            Physical Exam  Vitals and nursing note reviewed.   Constitutional:       Appearance: Normal appearance.       HENT:      Head: Normocephalic and atraumatic.      Nose: Nose normal.   Eyes:      Extraocular Movements: Extraocular movements intact.      Conjunctiva/sclera: Conjunctivae normal.      Pupils: Pupils are equal, round, and reactive to light.   Cardiovascular:      Rate and Rhythm: Normal rate and regular rhythm.      Pulses: Normal pulses.      Heart sounds: Normal heart sounds.   Pulmonary:      Effort: Pulmonary effort is normal.      Breath sounds: Normal breath sounds.   Abdominal:      General: Abdomen is flat. Bowel sounds are normal.      Palpations: Abdomen is soft.   Musculoskeletal:         General: Tenderness present.      Comments: Patient walks with a hunched back typical shopping cart sign   Skin:     General: Skin is warm.   Neurological:      Mental Status: He is alert.      Cranial Nerves: Cranial nerves 2-12 are intact.      Motor: Motor function is intact.      Coordination: Coordination is intact.      Gait: Gait abnormal.      Comments: Loss of bilateral ankle reflexes  Loss of right patellar reflexe  Loss of vibratory sensation in the right foot   Psychiatric:         Mood and Affect: Mood normal.         Behavior: Behavior normal.            Assessment  Very pleasant 68 years old gentleman with significant history of lumbar laminectomy at the L5 in the 70s who was doing very well until recently for the last year or so and now for the last few weeks he is miserable in pain barely could stand up or stretch his back.  He is still working  full-time and he cannot take the time off he is hoping to get the time off for his knee replacement in this January.  I gave him brochures about back exercises and core exercises in addition to stretching exercises.  In the meantime we will plan on caudal epidural steroid injection after lorazepam.  Above all the patient exam showing significant neurological deficit affecting the right leg and I highly recommend immediate MRI of his lumbar spine to evaluate for targeted injection or possible referral to surgery.           Plan  At least 50% of the visit was involved in the discussion of the options for treatment. We discussed exercises, medication, interventional therapies and surgery. Healthy life style is essential with patient hard work to achieve the wellness. In addition; discussion with the patient and/or family about any of the diagnostic results, impressions and/or recommended diagnostic studies, prognosis, risks and benefits of treatment options, instructions for treatment and/or follow-up, importance of compliance with chosen treatment options, risk-factor reduction, and patient/family education.           Recommended Pool therapy, walking in the pool, at least 3x per week for 30 minutes  Recommend self-directed physical therapy with at least daily exercises for minimum of 20-minute, brochure was handed to the patient  Core exercises and stretching exercises were given to the patient  MRI of the lumbar spine ASAP  Caudal epidural steroid injection after lorazepam  Healthy lifestyle and anti-inflammatory diet in addition to weight control discussed with the patient  Alternative chronic pain therapies was discussed, encouraged and information was handed  Return to Clinic after injection     *Please note this report has been produced using speech recognition software and may contain errors related to that system including grammar, punctuation and spelling as well as words and phrases that may be  inappropriate. If there are questions or concerns, please feel free to contact me to clarify.    Amee Linton MD

## 2024-12-14 ENCOUNTER — HOSPITAL ENCOUNTER (OUTPATIENT)
Dept: RADIOLOGY | Facility: HOSPITAL | Age: 69
Discharge: HOME | End: 2024-12-14
Payer: MEDICARE

## 2024-12-14 DIAGNOSIS — M96.1 POSTLAMINECTOMY SYNDROME OF LUMBAR REGION: ICD-10-CM

## 2024-12-14 DIAGNOSIS — M48.062 LUMBAR STENOSIS WITH NEUROGENIC CLAUDICATION: ICD-10-CM

## 2024-12-14 DIAGNOSIS — M47.27 LUMBOSACRAL SPONDYLOSIS WITH RADICULOPATHY: ICD-10-CM

## 2024-12-14 PROCEDURE — 72148 MRI LUMBAR SPINE W/O DYE: CPT | Performed by: RADIOLOGY

## 2024-12-14 PROCEDURE — 72148 MRI LUMBAR SPINE W/O DYE: CPT

## 2024-12-23 ENCOUNTER — HOSPITAL ENCOUNTER (OUTPATIENT)
Dept: PAIN MEDICINE | Facility: CLINIC | Age: 69
Discharge: HOME | End: 2024-12-23
Payer: MEDICARE

## 2024-12-23 ENCOUNTER — TELEPHONE (OUTPATIENT)
Dept: PAIN MEDICINE | Facility: CLINIC | Age: 69
End: 2024-12-23

## 2024-12-23 VITALS
TEMPERATURE: 96.4 F | RESPIRATION RATE: 18 BRPM | DIASTOLIC BLOOD PRESSURE: 85 MMHG | SYSTOLIC BLOOD PRESSURE: 126 MMHG | OXYGEN SATURATION: 97 % | HEART RATE: 67 BPM

## 2024-12-23 DIAGNOSIS — M47.27 LUMBOSACRAL SPONDYLOSIS WITH RADICULOPATHY: ICD-10-CM

## 2024-12-23 DIAGNOSIS — M96.1 POSTLAMINECTOMY SYNDROME OF LUMBAR REGION: ICD-10-CM

## 2024-12-23 DIAGNOSIS — M48.062 LUMBAR STENOSIS WITH NEUROGENIC CLAUDICATION: ICD-10-CM

## 2024-12-23 PROCEDURE — 62323 NJX INTERLAMINAR LMBR/SAC: CPT | Performed by: ANESTHESIOLOGY

## 2024-12-23 PROCEDURE — 2550000001 HC RX 255 CONTRASTS: Performed by: ANESTHESIOLOGY

## 2024-12-23 PROCEDURE — 2500000005 HC RX 250 GENERAL PHARMACY W/O HCPCS: Performed by: ANESTHESIOLOGY

## 2024-12-23 PROCEDURE — 2500000004 HC RX 250 GENERAL PHARMACY W/ HCPCS (ALT 636 FOR OP/ED): Performed by: ANESTHESIOLOGY

## 2024-12-23 RX ORDER — LIDOCAINE HYDROCHLORIDE 10 MG/ML
INJECTION, SOLUTION EPIDURAL; INFILTRATION; INTRACAUDAL; PERINEURAL AS NEEDED
Status: COMPLETED | OUTPATIENT
Start: 2024-12-23 | End: 2024-12-23

## 2024-12-23 RX ORDER — SODIUM CHLORIDE 9 MG/ML
INJECTION, SOLUTION INTRAMUSCULAR; INTRAVENOUS; SUBCUTANEOUS AS NEEDED
Status: COMPLETED | OUTPATIENT
Start: 2024-12-23 | End: 2024-12-23

## 2024-12-23 RX ORDER — METHYLPREDNISOLONE ACETATE 80 MG/ML
INJECTION, SUSPENSION INTRA-ARTICULAR; INTRALESIONAL; INTRAMUSCULAR; SOFT TISSUE AS NEEDED
Status: COMPLETED | OUTPATIENT
Start: 2024-12-23 | End: 2024-12-23

## 2024-12-23 RX ORDER — LIDOCAINE HYDROCHLORIDE AND EPINEPHRINE 10; 10 UG/ML; MG/ML
INJECTION, SOLUTION INFILTRATION; PERINEURAL AS NEEDED
Status: COMPLETED | OUTPATIENT
Start: 2024-12-23 | End: 2024-12-23

## 2024-12-23 ASSESSMENT — PAIN DESCRIPTION - DESCRIPTORS: DESCRIPTORS: ACHING;PINS AND NEEDLES

## 2024-12-23 ASSESSMENT — PAIN SCALES - GENERAL
PAINLEVEL_OUTOF10: 7
PAINLEVEL_OUTOF10: 9

## 2024-12-23 ASSESSMENT — ENCOUNTER SYMPTOMS
OCCASIONAL FEELINGS OF UNSTEADINESS: 1
DEPRESSION: 0
LOSS OF SENSATION IN FEET: 1

## 2024-12-23 ASSESSMENT — PAIN - FUNCTIONAL ASSESSMENT: PAIN_FUNCTIONAL_ASSESSMENT: 0-10

## 2025-01-27 ENCOUNTER — PRE-ADMISSION TESTING (OUTPATIENT)
Dept: PREADMISSION TESTING | Facility: HOSPITAL | Age: 70
End: 2025-01-27
Payer: MEDICARE

## 2025-01-27 ENCOUNTER — APPOINTMENT (OUTPATIENT)
Dept: ORTHOPEDIC SURGERY | Facility: CLINIC | Age: 70
End: 2025-01-27
Payer: MEDICARE

## 2025-01-27 VITALS
RESPIRATION RATE: 16 BRPM | OXYGEN SATURATION: 95 % | HEIGHT: 72 IN | HEART RATE: 92 BPM | WEIGHT: 260.14 LBS | TEMPERATURE: 96.4 F | BODY MASS INDEX: 35.24 KG/M2

## 2025-01-27 DIAGNOSIS — I10 PRIMARY HYPERTENSION: ICD-10-CM

## 2025-01-27 DIAGNOSIS — Z01.818 PREOP TESTING: Primary | ICD-10-CM

## 2025-01-27 DIAGNOSIS — R79.9 ABNORMAL FINDING OF BLOOD CHEMISTRY, UNSPECIFIED: ICD-10-CM

## 2025-01-27 DIAGNOSIS — M17.12 LOCALIZED OSTEOARTHRITIS OF LEFT KNEE: ICD-10-CM

## 2025-01-27 DIAGNOSIS — R79.1 ABNORMAL COAGULATION PROFILE: ICD-10-CM

## 2025-01-27 DIAGNOSIS — I48.0 PAROXYSMAL ATRIAL FIBRILLATION (MULTI): ICD-10-CM

## 2025-01-27 LAB
APTT PPP: 37 SECONDS (ref 27–38)
ERYTHROCYTE [DISTWIDTH] IN BLOOD BY AUTOMATED COUNT: 12.3 % (ref 11.5–14.5)
EST. AVERAGE GLUCOSE BLD GHB EST-MCNC: 103 MG/DL
HBA1C MFR BLD: 5.2 %
HCT VFR BLD AUTO: 41.3 % (ref 41–52)
HGB BLD-MCNC: 13.6 G/DL (ref 13.5–17.5)
INR PPP: 1.1 (ref 0.9–1.1)
MCH RBC QN AUTO: 30 PG (ref 26–34)
MCHC RBC AUTO-ENTMCNC: 32.9 G/DL (ref 32–36)
MCV RBC AUTO: 91 FL (ref 80–100)
NRBC BLD-RTO: 0 /100 WBCS (ref 0–0)
PLATELET # BLD AUTO: 303 X10*3/UL (ref 150–450)
PROTHROMBIN TIME: 12.5 SECONDS (ref 9.8–12.8)
RBC # BLD AUTO: 4.54 X10*6/UL (ref 4.5–5.9)
WBC # BLD AUTO: 6.9 X10*3/UL (ref 4.4–11.3)

## 2025-01-27 PROCEDURE — 93005 ELECTROCARDIOGRAM TRACING: CPT

## 2025-01-27 PROCEDURE — 83036 HEMOGLOBIN GLYCOSYLATED A1C: CPT | Mod: PARLAB

## 2025-01-27 PROCEDURE — 85610 PROTHROMBIN TIME: CPT

## 2025-01-27 PROCEDURE — 93010 ELECTROCARDIOGRAM REPORT: CPT | Performed by: STUDENT IN AN ORGANIZED HEALTH CARE EDUCATION/TRAINING PROGRAM

## 2025-01-27 PROCEDURE — 85027 COMPLETE CBC AUTOMATED: CPT

## 2025-01-27 PROCEDURE — 99204 OFFICE O/P NEW MOD 45 MIN: CPT | Performed by: NURSE PRACTITIONER

## 2025-01-27 PROCEDURE — 36415 COLL VENOUS BLD VENIPUNCTURE: CPT

## 2025-01-27 PROCEDURE — 87081 CULTURE SCREEN ONLY: CPT | Mod: PARLAB

## 2025-01-27 RX ORDER — TURMERIC 400 MG
CAPSULE ORAL
COMMUNITY

## 2025-01-27 RX ORDER — CHLORHEXIDINE GLUCONATE 40 MG/ML
SOLUTION TOPICAL DAILY
Qty: 118 ML | Refills: 0 | Status: SHIPPED | OUTPATIENT
Start: 2025-01-27 | End: 2025-02-01

## 2025-01-27 RX ORDER — CHLORHEXIDINE GLUCONATE ORAL RINSE 1.2 MG/ML
15 SOLUTION DENTAL DAILY
Qty: 30 ML | Refills: 0 | Status: SHIPPED | OUTPATIENT
Start: 2025-01-27 | End: 2025-01-29

## 2025-01-27 RX ORDER — GLUCOSAMINE/CHONDRO SU A 500-400 MG
1 TABLET ORAL 3 TIMES DAILY
COMMUNITY

## 2025-01-27 ASSESSMENT — DUKE ACTIVITY SCORE INDEX (DASI)
CAN YOU PARTICIPATE IN STRENOUS SPORTS LIKE SWIMMING, SINGLES TENNIS, FOOTBALL, BASKETBALL, OR SKIING: NO
CAN YOU DO YARD WORK LIKE RAKING LEAVES, WEEDING OR PUSHING A MOWER: NO
CAN YOU DO LIGHT WORK AROUND THE HOUSE LIKE DUSTING OR WASHING DISHES: YES
CAN YOU WALK A BLOCK OR TWO ON LEVEL GROUND: NO
CAN YOU HAVE SEXUAL RELATIONS: NO
CAN YOU CLIMB A FLIGHT OF STAIRS OR WALK UP A HILL: YES
DASI METS SCORE: 5.7
CAN YOU TAKE CARE OF YOURSELF (EAT, DRESS, BATHE, OR USE TOILET): YES
CAN YOU WALK INDOORS, SUCH AS AROUND YOUR HOUSE: YES
CAN YOU DO HEAVY WORK AROUND THE HOUSE LIKE SCRUBBING FLOORS OR LIFTING AND MOVING HEAVY FURNITURE: YES
CAN YOU RUN A SHORT DISTANCE: NO
CAN YOU PARTICIPATE IN MODERATE RECREATIONAL ACTIVITIES LIKE GOLF, BOWLING, DANCING, DOUBLES TENNIS OR THROWING A BASEBALL OR FOOTBALL: NO
TOTAL_SCORE: 24.2
CAN YOU DO MODERATE WORK AROUND THE HOUSE LIKE VACUUMING, SWEEPING FLOORS OR CARRYING GROCERIES: YES

## 2025-01-27 NOTE — H&P (VIEW-ONLY)
CPM/PAT Evaluation       Name: Navi Valdez (Navi Valdez)  /Age: 1955/69 y.o.     In-Person       Chief Complaint: PAT for planned Left knee surgery    69 yr old male w/PHx of A-fib, HTN, HLD, chronic back pain and Left knee OA referred to PAT for planned Left total knee replacement w/Dr Seth on 2025     Patient reports feeling overall well, denies fever, cough or recent infection. Reports not as active as desired d/t both knee and back pain, can complete ADLs without incident, ambulates independently w/cane, denies falls; denies cardiac or respiratory symptoms. Past surgical hx includes both knee arthroscopy (); back surgery () and Left hip replacement (); denies past issues with anesthesia.      Followed by PCP (Aracely Motley DO) - last visit 2025 (unable to view note) patient reports visit was for medical clearance and was cleared by PCP  Followed by cardiology (Harish Marino MD) - last visit 2024; surgical clearance provided  Followed by pain management (Amee Amos MD) - last visit 2024 for back pain injection  Followed by dermatology (Susan Mayne APRN) - last visit 2024 for Left knee skin nodule excision/biopsy         Past Medical History:   Diagnosis Date    A-fib (Multi)     Anxiety     Arthritis     Hyperlipidemia     Hypertension     Personal history of other medical treatment     History of cardiac monitoring    Personal history of other medical treatment     History of echocardiogram    Vision loss        Past Surgical History:   Procedure Laterality Date    BACK SURGERY      COLONOSCOPY      FOOT SURGERY      HERNIA REPAIR      HIP ARTHROPLASTY      JOINT REPLACEMENT      MENISCECTOMY      TONSILLECTOMY      VASECTOMY         Patient  has no history on file for sexual activity.    Family History   Problem Relation Name Age of Onset    Lung cancer Mother      Heart disease Father         No Known Allergies    Prior to Admission medications     Medication Sig Start Date End Date Taking? Authorizing Provider   acetaminophen (Tylenol 8 HOUR) 650 mg ER tablet Take 1 tablet (650 mg) by mouth every 8 hours if needed for mild pain (1 - 3). Do not crush, chew, or split.    Historical Provider, MD   apixaban (Eliquis) 5 mg tablet Take 1 tablet (5 mg) by mouth 2 times a day. 5/17/24   Harish Marino MD   atorvastatin (Lipitor) 20 mg tablet Take 1 tablet (20 mg) by mouth once daily. 5/17/24 5/17/25  Harish Marino MD   chlorhexidine (Hibiclens) 4 % external liquid Apply topically once daily for 5 days. Wash daily for 5 days prior to surgery with day 5 being morning of surgery. 1/27/25 2/1/25  LUIS MANUEL Avila-CNP   chlorhexidine (Peridex) 0.12 % solution Use 15 mL in the mouth or throat once daily for 2 doses. Swish and Spit day before surgery and again morning on day of surgery. 1/27/25 1/29/25  LUIS MANUEL Avila-CARSON   hydroCHLOROthiazide (HYDRODiuril) 25 mg tablet Take 1 tablet (25 mg) by mouth once daily. 10/17/24 10/17/25  Harish Marino MD   endgs-milad-1-dha-epa-lipids 009-47-30-50 mg capsule Take by mouth.    Historical Provider, MD   lidocaine (Lidoderm) 5 % patch Place 1 patch over 12 hours on the skin once daily. Remove & discard patch within 12 hours or as directed by MD.  Patient not taking: Reported on 12/10/2024 11/22/24   Eva Shelton PA-C   lisinopril 40 mg tablet Take 1 tablet (40 mg) by mouth once daily. 8/23/24 8/23/25  Harish Marino MD   LORazepam (Ativan) 2 mg tablet Take 1 p.o. 1 hour before the procedure 12/10/24   Amee Linton MD   methylPREDNISolone (Medrol Dospak) 4 mg tablets Follow schedule on package instructions  Patient not taking: Reported on 12/10/2024 11/22/24   Eva S Vomero, PA-C   metoprolol succinate XL (Toprol-XL) 100 mg 24 hr tablet Take 1 tablet (100 mg) by mouth once daily. 8/13/24 8/13/25  Harish Marino MD   triamcinolone (Kenalog) 0.1 % cream Thin coat twice daily  to affected areas on  for 3-4 weeks, then weekends only. Repeat every few months for flares.  Patient not taking: Reported on 12/10/2024 4/27/24   Susan L Mayne, APRN-CNP        Review of Systems    Constitutional: no fever, no chills and not feeling poorly.   Eyes: no eyesight problems.   ENT: no hearing loss, no nosebleeds and no sore throat.   Cardiovascular: no chest pain, no palpitations and no extremity edema.   Respiratory: no sob, no wheezing, no cough and no sob w/exertion.   Gastrointestinal: negative for abdominal pain, blood in stools or changes in bowel habits   Genitourinary: negative for dysuria, incontinence or changes in urinary habits   Musculoskeletal: +arthralgias, ambulates independently w/cane.   Integumentary: negative for lesions, rash or itching.   Neurological: negative for confusion, dizziness or fainting.   Psychiatric: not suicidal, no anxiety and no depression.   All other systems have been reviewed and are negative for complaint.     Physical Exam  Vitals reviewed.   HENT:      Head: Normocephalic.   Eyes:      Pupils: Pupils are equal, round, and reactive to light.      Comments: Corrective lenses in use   Cardiovascular:      Rate and Rhythm: Normal rate and regular rhythm.      Pulses: Normal pulses.   Pulmonary:      Effort: Pulmonary effort is normal.      Breath sounds: Normal breath sounds.   Abdominal:      General: Bowel sounds are normal.   Musculoskeletal:         General: Normal range of motion.      Cervical back: Normal range of motion.      Comments: Cane in use   Skin:     General: Skin is warm and dry.      Comments: Had Left knee cyst/nodule excision by dermatology (now healed) - surgeon aware per pt report - current knee skin is clear   Neurological:      Mental Status: He is alert and oriented to person, place, and time.   Psychiatric:         Mood and Affect: Mood normal.          PAT AIRWAY:   Airway:     Mallampati::  I    TM distance::  >3 FB    Neck ROM::   Full   upper dentures and lower dentures      Testing/Diagnostic: CBC, BMP, coag, A1c, staph/MRSA, ecg    Patient Specialist/PCP: Aracely Motley DO (PCP) 1/16/2025; Harish Marino MD (cardiology) 11/26/2024; Amee Amos MD (pain management) 12/10/2024; Susan Mayne APRN (dermatology) 4/27/2024    Visit Vitals  Pulse 92   Temp 35.8 °C (96.4 °F)   Resp 16   Ht 1.829 m (6')   Wt 118 kg (260 lb 2.3 oz)   SpO2 95%   BMI 35.28 kg/m²   Smoking Status Never   BSA 2.45 m²       DASI Risk Score      Flowsheet Row Pre-Admission Testing from [facility]/27/2025 in [facility]   Can you take care of yourself (eat, dress, bathe, or use toilet)?  2.75 filed at 01/27/2025 1045   Can you walk indoors, such as around your house? 1.75 filed at 01/27/2025 1045   Can you walk a block or two on level ground?  0 filed at 01/27/2025 1045   Can you climb a flight of stairs or walk up a hill? 5.5 filed at 01/27/2025 1045   Can you run a short distance? 0 filed at 01/27/2025 1045   Can you do light work around the house like dusting or washing dishes? 2.7 filed at 01/27/2025 1045   Can you do moderate work around the house like vacuuming, sweeping floors or carrying groceries? 3.5 filed at 01/27/2025 1045   Can you do heavy work around the house like scrubbing floors or lifting and moving heavy furniture?  8 filed at 01/27/2025 1045   Can you do yard work like raking leaves, weeding or pushing a mower? 0 filed at 01/27/2025 1045   Can you have sexual relations? 0 filed at 01/27/2025 1045   Can you participate in moderate recreational activities like golf, bowling, dancing, doubles tennis or throwing a baseball or football? 0 filed at 01/27/2025 1045   Can you participate in strenous sports like swimming, singles tennis, football, basketball, or skiing? 0 filed at 01/27/2025 1045   DASI SCORE 24.2 filed at 01/27/2025 1045   METS Score (Will be calculated only when all the questions are answered) 5.7 filed at 01/27/2025 1045           Caprini DVT Assessment    No data to display       Modified Frailty Index    No data to display       CHADS2 Stroke Risk  Current as of about an hour ago        2.8% 3 to 100%: High Risk   2 to < 3%: Medium Risk   0 to < 2%: Low Risk     Last Change: N/A          This score determines the patient's risk of having a stroke if the patient has atrial fibrillation.          Points Metrics   0 Has Congestive Heart Failure:  No     Patients with congestive heart failure get 1 point.    Current as of about an hour ago   1 Has Hypertension:  Yes     Patients with hypertension get 1 point.    Current as of about an hour ago   0 Age:  69     Patients who are 75 years of age or older get 1 point.    Current as of about an hour ago   0 Has Diabetes Excluding Gestational Diabetes:  No     Patients with diabetes get 1 point.    Current as of about an hour ago   0 Had Stroke:  No  Had TIA:  No  Had Thromboembolism:  No     Patients who have had a stroke, TIA, or thromboembolism get 2 points.    Current as of about an hour ago             Revised Cardiac Risk Index    No data to display       Apfel Simplified Score    No data to display       Risk Analysis Index Results This Encounter    No data found in the last 10 encounters.       Stop Bang Score      Flowsheet Row Pre-Admission Testing from 1/27/20[facility] in [facility]   Do you snore loudly? 1 filed at 01/27/2025 1045   Do you often feel tired or fatigued after your sleep? 0 filed at 01/27/2025 1045   Has anyone ever observed you stop breathing in your sleep? 0 filed at 01/27/2025 1045   Do you have or are you being treated for high blood pressure? 1 filed at 01/27/2025 1045   Recent BMI (Calculated) 35.3 filed at 01/27/2025 1045   Is BMI greater than 35 kg/m2? 1=Yes filed at 01/27/2025 1045   Age older than 50 years old? 1=Yes filed at 01/27/2025 1045   Is your neck circumference greater than 17 inches (Male) or 16 inches (Female)? 1 filed at 01/27/2025 1045    Gender - Male 1=Yes filed at 01/27/2025 1045   STOP-BANG Total Score 6 filed at 01/27/2025 1045          Prodigy: High Risk  Total Score: 16              Prodigy Age Score      Prodigy Gender Score          ARISCAT Score for Postoperative Pulmonary Complications    No data to display       Ruff Perioperative Risk for Myocardial Infarction or Cardiac Arrest (GINI)    No data to display         Assessment and Plan:     # A-fib -  hold Eliquis 3 days before surgery; followed by cardiology     # HTN - continue hctz, metoprolol; hold Lisinopril evening before and day of surgery  # HLD - continue atorvastatin  # Left knee OA - Left total knee replacement w/Dr Seth on 2/5/2025    Cardiac clearance provided (Harish Marino MD, 11/26/2024)  Medical clearance requested by ortho and pending    Ecg performed today - Sinus rhythm w/occasional PVCs (73 bpm)  Medical hx, Allergies, VS and Labs reviewed  Medications addressed w/pre-op instructions provided

## 2025-01-27 NOTE — H&P (VIEW-ONLY)
CPM/PAT Evaluation       Name: Navi Valdez (Navi Valdez)  /Age: 1955/69 y.o.     In-Person       Chief Complaint: PAT for planned Left knee surgery    69 yr old male w/PHx of A-fib, HTN, HLD, chronic back pain and Left knee OA referred to PAT for planned Left total knee replacement w/Dr Seth on 2025     Patient reports feeling overall well, denies fever, cough or recent infection. Reports not as active as desired d/t both knee and back pain, can complete ADLs without incident, ambulates independently w/cane, denies falls; denies cardiac or respiratory symptoms. Past surgical hx includes both knee arthroscopy (); back surgery () and Left hip replacement (); denies past issues with anesthesia.      Followed by PCP (Aracely Motley DO) - last visit 2025 (unable to view note) patient reports visit was for medical clearance and was cleared by PCP  Followed by cardiology (Harish Marino MD) - last visit 2024; surgical clearance provided  Followed by pain management (Amee Amos MD) - last visit 2024 for back pain injection  Followed by dermatology (Susan Mayne APRN) - last visit 2024 for Left knee skin nodule excision/biopsy         Past Medical History:   Diagnosis Date    A-fib (Multi)     Anxiety     Arthritis     Hyperlipidemia     Hypertension     Personal history of other medical treatment     History of cardiac monitoring    Personal history of other medical treatment     History of echocardiogram    Vision loss        Past Surgical History:   Procedure Laterality Date    BACK SURGERY      COLONOSCOPY      FOOT SURGERY      HERNIA REPAIR      HIP ARTHROPLASTY      JOINT REPLACEMENT      MENISCECTOMY      TONSILLECTOMY      VASECTOMY         Patient  has no history on file for sexual activity.    Family History   Problem Relation Name Age of Onset    Lung cancer Mother      Heart disease Father         No Known Allergies    Prior to Admission medications     Medication Sig Start Date End Date Taking? Authorizing Provider   acetaminophen (Tylenol 8 HOUR) 650 mg ER tablet Take 1 tablet (650 mg) by mouth every 8 hours if needed for mild pain (1 - 3). Do not crush, chew, or split.    Historical Provider, MD   apixaban (Eliquis) 5 mg tablet Take 1 tablet (5 mg) by mouth 2 times a day. 5/17/24   Harish Marnio MD   atorvastatin (Lipitor) 20 mg tablet Take 1 tablet (20 mg) by mouth once daily. 5/17/24 5/17/25  Harish Marino MD   chlorhexidine (Hibiclens) 4 % external liquid Apply topically once daily for 5 days. Wash daily for 5 days prior to surgery with day 5 being morning of surgery. 1/27/25 2/1/25  LUIS MANUEL Avila-CNP   chlorhexidine (Peridex) 0.12 % solution Use 15 mL in the mouth or throat once daily for 2 doses. Swish and Spit day before surgery and again morning on day of surgery. 1/27/25 1/29/25  LUIS MANUEL Avila-CARSON   hydroCHLOROthiazide (HYDRODiuril) 25 mg tablet Take 1 tablet (25 mg) by mouth once daily. 10/17/24 10/17/25  Harish Marino MD   hkckf-diuac-1-dha-epa-lipids 125-98-31-50 mg capsule Take by mouth.    Historical Provider, MD   lidocaine (Lidoderm) 5 % patch Place 1 patch over 12 hours on the skin once daily. Remove & discard patch within 12 hours or as directed by MD.  Patient not taking: Reported on 12/10/2024 11/22/24   Eva Shelton PA-C   lisinopril 40 mg tablet Take 1 tablet (40 mg) by mouth once daily. 8/23/24 8/23/25  Harish Marino MD   LORazepam (Ativan) 2 mg tablet Take 1 p.o. 1 hour before the procedure 12/10/24   Amee Linton MD   methylPREDNISolone (Medrol Dospak) 4 mg tablets Follow schedule on package instructions  Patient not taking: Reported on 12/10/2024 11/22/24   Eva S Vomero, PA-C   metoprolol succinate XL (Toprol-XL) 100 mg 24 hr tablet Take 1 tablet (100 mg) by mouth once daily. 8/13/24 8/13/25  Harish Marino MD   triamcinolone (Kenalog) 0.1 % cream Thin coat twice daily  to affected areas on  for 3-4 weeks, then weekends only. Repeat every few months for flares.  Patient not taking: Reported on 12/10/2024 4/27/24   Susan L Mayne, APRN-CNP        Review of Systems    Constitutional: no fever, no chills and not feeling poorly.   Eyes: no eyesight problems.   ENT: no hearing loss, no nosebleeds and no sore throat.   Cardiovascular: no chest pain, no palpitations and no extremity edema.   Respiratory: no sob, no wheezing, no cough and no sob w/exertion.   Gastrointestinal: negative for abdominal pain, blood in stools or changes in bowel habits   Genitourinary: negative for dysuria, incontinence or changes in urinary habits   Musculoskeletal: +arthralgias, ambulates independently w/cane.   Integumentary: negative for lesions, rash or itching.   Neurological: negative for confusion, dizziness or fainting.   Psychiatric: not suicidal, no anxiety and no depression.   All other systems have been reviewed and are negative for complaint.     Physical Exam  Vitals reviewed.   HENT:      Head: Normocephalic.   Eyes:      Pupils: Pupils are equal, round, and reactive to light.      Comments: Corrective lenses in use   Cardiovascular:      Rate and Rhythm: Normal rate and regular rhythm.      Pulses: Normal pulses.   Pulmonary:      Effort: Pulmonary effort is normal.      Breath sounds: Normal breath sounds.   Abdominal:      General: Bowel sounds are normal.   Musculoskeletal:         General: Normal range of motion.      Cervical back: Normal range of motion.      Comments: Cane in use   Skin:     General: Skin is warm and dry.      Comments: Had Left knee cyst/nodule excision by dermatology (now healed) - surgeon aware per pt report - current knee skin is clear   Neurological:      Mental Status: He is alert and oriented to person, place, and time.   Psychiatric:         Mood and Affect: Mood normal.          PAT AIRWAY:   Airway:     Mallampati::  I    TM distance::  >3 FB    Neck ROM::   Full   upper dentures and lower dentures      Testing/Diagnostic: CBC, BMP, coag, A1c, staph/MRSA, ecg    Patient Specialist/PCP: Aracely Motley DO (PCP) 1/16/2025; Harish Marino MD (cardiology) 11/26/2024; Amee Amos MD (pain management) 12/10/2024; Susan Mayne APRN (dermatology) 4/27/2024    Visit Vitals  Pulse 92   Temp 35.8 °C (96.4 °F)   Resp 16   Ht 1.829 m (6')   Wt 118 kg (260 lb 2.3 oz)   SpO2 95%   BMI 35.28 kg/m²   Smoking Status Never   BSA 2.45 m²       DASI Risk Score      Flowsheet Row Pre-Admission Testing [facility]/27/[facility] in [facility]   Can you take care of yourself (eat, dress, bathe, or use toilet)?  2.75 filed at 01/27/2025 1045   Can you walk indoors, such as around your house? 1.75 filed at 01/27/2025 1045   Can you walk a block or two on level ground?  0 filed at 01/27/2025 1045   Can you climb a flight of stairs or walk up a hill? 5.5 filed at 01/27/2025 1045   Can you run a short distance? 0 filed at 01/27/2025 1045   Can you do light work around the house like dusting or washing dishes? 2.7 filed at 01/27/2025 1045   Can you do moderate work around the house like vacuuming, sweeping floors or carrying groceries? 3.5 filed at 01/27/2025 1045   Can you do heavy work around the house like scrubbing floors or lifting and moving heavy furniture?  8 filed at 01/27/2025 1045   Can you do yard work like raking leaves, weeding or pushing a mower? 0 filed at 01/27/2025 1045   Can you have sexual relations? 0 filed at 01/27/2025 1045   Can you participate in moderate recreational activities like golf, bowling, dancing, doubles tennis or throwing a baseball or football? 0 filed at 01/27/2025 1045   Can you participate in strenous sports like swimming, singles tennis, football, basketball, or skiing? 0 filed at 01/27/2025 1045   DASI SCORE 24.2 filed at 01/27/2025 1045   METS Score (Will be calculated only when all the questions are answered) 5.7 filed at 01/27/2025 1045           Caprini DVT Assessment    No data to display       Modified Frailty Index    No data to display       CHADS2 Stroke Risk  Current as of about an hour ago        2.8% 3 to 100%: High Risk   2 to < 3%: Medium Risk   0 to < 2%: Low Risk     Last Change: N/A          This score determines the patient's risk of having a stroke if the patient has atrial fibrillation.          Points Metrics   0 Has Congestive Heart Failure:  No     Patients with congestive heart failure get 1 point.    Current as of about an hour ago   1 Has Hypertension:  Yes     Patients with hypertension get 1 point.    Current as of about an hour ago   0 Age:  69     Patients who are 75 years of age or older get 1 point.    Current as of about an hour ago   0 Has Diabetes Excluding Gestational Diabetes:  No     Patients with diabetes get 1 point.    Current as of about an hour ago   0 Had Stroke:  No  Had TIA:  No  Had Thromboembolism:  No     Patients who have had a stroke, TIA, or thromboembolism get 2 points.    Current as of about an hour ago             Revised Cardiac Risk Index    No data to display       Apfel Simplified Score    No data to display       Risk Analysis Index Results This Encounter    No data found in the last 10 encounters.       Stop Bang Score      Flowsheet Row Pre-Admission Testing from 1/27/20[facility] in [facility]   Do you snore loudly? 1 filed at 01/27/2025 1045   Do you often feel tired or fatigued after your sleep? 0 filed at 01/27/2025 1045   Has anyone ever observed you stop breathing in your sleep? 0 filed at 01/27/2025 1045   Do you have or are you being treated for high blood pressure? 1 filed at 01/27/2025 1045   Recent BMI (Calculated) 35.3 filed at 01/27/2025 1045   Is BMI greater than 35 kg/m2? 1=Yes filed at 01/27/2025 1045   Age older than 50 years old? 1=Yes filed at 01/27/2025 1045   Is your neck circumference greater than 17 inches (Male) or 16 inches (Female)? 1 filed at 01/27/2025 1045    Gender - Male 1=Yes filed at 01/27/2025 1045   STOP-BANG Total Score 6 filed at 01/27/2025 1045          Prodigy: High Risk  Total Score: 16              Prodigy Age Score      Prodigy Gender Score          ARISCAT Score for Postoperative Pulmonary Complications    No data to display       Ruff Perioperative Risk for Myocardial Infarction or Cardiac Arrest (GINI)    No data to display         Assessment and Plan:     # A-fib -  hold Eliquis 3 days before surgery; followed by cardiology     # HTN - continue hctz, metoprolol; hold Lisinopril evening before and day of surgery  # HLD - continue atorvastatin  # Left knee OA - Left total knee replacement w/Dr Seth on 2/5/2025    Cardiac clearance provided (Harish Marino MD, 11/26/2024)  Medical clearance requested by ortho and pending    Ecg performed today - Sinus rhythm w/occasional PVCs (73 bpm)  Medical hx, Allergies, VS and Labs reviewed  Medications addressed w/pre-op instructions provided

## 2025-01-27 NOTE — PREPROCEDURE INSTRUCTIONS
Medication List            Accurate as of January 27, 2025 11:09 AM. Always use your most recent med list.                acetaminophen 650 mg ER tablet  Commonly known as: Tylenol 8 HOUR  Medication Adjustments for Surgery: Take/Use as prescribed     apixaban 5 mg tablet  Commonly known as: Eliquis  Take 1 tablet (5 mg) by mouth 2 times a day.  Additional Medication Adjustments for Surgery: Other (Comment)  Notes to patient: Hold 3 days prior to surgery     atorvastatin 20 mg tablet  Commonly known as: Lipitor  Take 1 tablet (20 mg) by mouth once daily.  Medication Adjustments for Surgery: Take/Use as prescribed     * chlorhexidine 4 % external liquid  Commonly known as: Hibiclens  Apply topically once daily for 5 days. Wash daily for 5 days prior to surgery with day 5 being morning of surgery.  Medication Adjustments for Surgery: Take/Use as prescribed     * chlorhexidine 0.12 % solution  Commonly known as: Peridex  Use 15 mL in the mouth or throat once daily for 2 doses. Swish and Spit day before surgery and again morning on day of surgery.  Medication Adjustments for Surgery: Take/Use as prescribed     glucosamine-chondroitin 500-400 mg tablet  Additional Medication Adjustments for Surgery: Take last dose 7 days before surgery     hydroCHLOROthiazide 25 mg tablet  Commonly known as: HYDRODiuril  Take 1 tablet (25 mg) by mouth once daily.  Medication Adjustments for Surgery: Take/Use as prescribed     krill-omega-3-dha-epa-lipids 696-18-48-50 mg capsule  Additional Medication Adjustments for Surgery: Take last dose 7 days before surgery     lisinopril 40 mg tablet  Take 1 tablet (40 mg) by mouth once daily.  Medication Adjustments for Surgery: Take last dose 1 day (24 hours) before surgery  Additional Medication Adjustments for Surgery: Other (Comment)  Notes to patient: Do not take night before or day of surgery - (Do not take 24 hours before surgery)     metoprolol succinate  mg 24 hr tablet  Commonly  known as: Toprol-XL  Take 1 tablet (100 mg) by mouth once daily.  Medication Adjustments for Surgery: Take/Use as prescribed     triamcinolone 0.1 % cream  Commonly known as: Kenalog  Thin coat twice daily to affected areas on  for 3-4 weeks, then weekends only. Repeat every few months for flares.  Medication Adjustments for Surgery: Take/Use as prescribed  Additional Medication Adjustments for Surgery: Other (Comment)  Notes to patient: Avoid surgical site     turmeric 400 mg capsule  Additional Medication Adjustments for Surgery: Take last dose 7 days before surgery           * This list has 2 medication(s) that are the same as other medications prescribed for you. Read the directions carefully, and ask your doctor or other care provider to review them with you.              One of our staff members will call you one business day before your surgery between 12-4pm to let you know the time to arrive at the hospital. If you have not received a phone call by 2pm call [phone].     When you arrive at the hospital, go to Registration on the ground floor.   You will need a responsible adult to drive you home.     Prior to surgery date:  One (1) week prior to surgery STOP:  -Stop aspirin products. Do not take NSAIDS/ Ibuprofen, Aleve, Motrin. Okay to take Tylenol or Acetaminophen. Do not take vitamins, supplements, herbals, diet pills.   -Stop these medications:   -No alcohol for 24 hours prior to surgery.  -No smoking 24 hours prior. No Marijuana, CBD oil, or Vaping 48 hours prior to surgery.  -Enjoy a light supper the evening before surgery.    Day of Surgery:  -Nothing to eat or drink after midnight. This includes food of any kind (including hard candy, cough drops, mints and gum, coffee).   -You can have the 13oz of Clear liquids 2hrs prior to surgery time.   -No acrylic nails or nail polish on at least one fingernail; no polish on toes for foot surgery.   -No body piercing or jewelry.   -Shower as directed. No  deodorant, lotion, power, oils, perfume, make-up.   -Wear loose comfortable clothing to accommodate your bandages.     -Bring CPAP machine  -Bring as needed inhalers  -Bring urine specimen if directed  -Bring crutches/ walker if directed       NPO Instructions:    Do not eat any food after midnight the night before your surgery/procedure.    Additional Instructions:     The Day before Surgery:  Review your medication instructions, stop indicated medications  You will be contacted regarding the time of your arrival to facility and surgery time  Do not eat any food after Midnight

## 2025-01-27 NOTE — CPM/PAT H&P
CPM/PAT Evaluation       Name: Navi Valdez (Navi Valdez)  /Age: 1955/69 y.o.     In-Person       Chief Complaint: PAT for planned Left knee surgery    69 yr old male w/PHx of A-fib, HTN, HLD, chronic back pain and Left knee OA referred to PAT for planned Left total knee replacement w/Dr Seth on 2025     Patient reports feeling overall well, denies fever, cough or recent infection. Reports not as active as desired d/t both knee and back pain, can complete ADLs without incident, ambulates independently w/cane, denies falls; denies cardiac or respiratory symptoms. Past surgical hx includes both knee arthroscopy (); back surgery () and Left hip replacement (); denies past issues with anesthesia.      Followed by PCP (Aracely Motley DO) - last visit 2025 (unable to view note) patient reports visit was for medical clearance and was cleared by PCP  Followed by cardiology (Harish Marino MD) - last visit 2024; surgical clearance provided  Followed by pain management (Amee Amos MD) - last visit 2024 for back pain injection  Followed by dermatology (Susan Mayne APRN) - last visit 2024 for Left knee skin nodule excision/biopsy         Past Medical History:   Diagnosis Date    A-fib (Multi)     Anxiety     Arthritis     Hyperlipidemia     Hypertension     Personal history of other medical treatment     History of cardiac monitoring    Personal history of other medical treatment     History of echocardiogram    Vision loss        Past Surgical History:   Procedure Laterality Date    BACK SURGERY      COLONOSCOPY      FOOT SURGERY      HERNIA REPAIR      HIP ARTHROPLASTY      JOINT REPLACEMENT      MENISCECTOMY      TONSILLECTOMY      VASECTOMY         Patient  has no history on file for sexual activity.    Family History   Problem Relation Name Age of Onset    Lung cancer Mother      Heart disease Father         No Known Allergies    Prior to Admission medications     Medication Sig Start Date End Date Taking? Authorizing Provider   acetaminophen (Tylenol 8 HOUR) 650 mg ER tablet Take 1 tablet (650 mg) by mouth every 8 hours if needed for mild pain (1 - 3). Do not crush, chew, or split.    Historical Provider, MD   apixaban (Eliquis) 5 mg tablet Take 1 tablet (5 mg) by mouth 2 times a day. 5/17/24   Harish Marino MD   atorvastatin (Lipitor) 20 mg tablet Take 1 tablet (20 mg) by mouth once daily. 5/17/24 5/17/25  Harish Marino MD   chlorhexidine (Hibiclens) 4 % external liquid Apply topically once daily for 5 days. Wash daily for 5 days prior to surgery with day 5 being morning of surgery. 1/27/25 2/1/25  LUIS MANUEL Avila-CNP   chlorhexidine (Peridex) 0.12 % solution Use 15 mL in the mouth or throat once daily for 2 doses. Swish and Spit day before surgery and again morning on day of surgery. 1/27/25 1/29/25  LUIS MANUEL Avila-CARSON   hydroCHLOROthiazide (HYDRODiuril) 25 mg tablet Take 1 tablet (25 mg) by mouth once daily. 10/17/24 10/17/25  Harish Marino MD   wcdaf-ypawj-5-dha-epa-lipids 982-97-81-50 mg capsule Take by mouth.    Historical Provider, MD   lidocaine (Lidoderm) 5 % patch Place 1 patch over 12 hours on the skin once daily. Remove & discard patch within 12 hours or as directed by MD.  Patient not taking: Reported on 12/10/2024 11/22/24   Eva Shelton PA-C   lisinopril 40 mg tablet Take 1 tablet (40 mg) by mouth once daily. 8/23/24 8/23/25  Harish Marino MD   LORazepam (Ativan) 2 mg tablet Take 1 p.o. 1 hour before the procedure 12/10/24   Amee Linton MD   methylPREDNISolone (Medrol Dospak) 4 mg tablets Follow schedule on package instructions  Patient not taking: Reported on 12/10/2024 11/22/24   Eva S Vomero, PA-C   metoprolol succinate XL (Toprol-XL) 100 mg 24 hr tablet Take 1 tablet (100 mg) by mouth once daily. 8/13/24 8/13/25  Harish Marino MD   triamcinolone (Kenalog) 0.1 % cream Thin coat twice daily  to affected areas on  for 3-4 weeks, then weekends only. Repeat every few months for flares.  Patient not taking: Reported on 12/10/2024 4/27/24   Susan L Mayne, APRN-CNP        Review of Systems    Constitutional: no fever, no chills and not feeling poorly.   Eyes: no eyesight problems.   ENT: no hearing loss, no nosebleeds and no sore throat.   Cardiovascular: no chest pain, no palpitations and no extremity edema.   Respiratory: no sob, no wheezing, no cough and no sob w/exertion.   Gastrointestinal: negative for abdominal pain, blood in stools or changes in bowel habits   Genitourinary: negative for dysuria, incontinence or changes in urinary habits   Musculoskeletal: +arthralgias, ambulates independently w/cane.   Integumentary: negative for lesions, rash or itching.   Neurological: negative for confusion, dizziness or fainting.   Psychiatric: not suicidal, no anxiety and no depression.   All other systems have been reviewed and are negative for complaint.     Physical Exam  Vitals reviewed.   HENT:      Head: Normocephalic.   Eyes:      Pupils: Pupils are equal, round, and reactive to light.      Comments: Corrective lenses in use   Cardiovascular:      Rate and Rhythm: Normal rate and regular rhythm.      Pulses: Normal pulses.   Pulmonary:      Effort: Pulmonary effort is normal.      Breath sounds: Normal breath sounds.   Abdominal:      General: Bowel sounds are normal.   Musculoskeletal:         General: Normal range of motion.      Cervical back: Normal range of motion.      Comments: Cane in use   Skin:     General: Skin is warm and dry.      Comments: Had Left knee cyst/nodule excision by dermatology (now healed) - surgeon aware per pt report - current knee skin is clear   Neurological:      Mental Status: He is alert and oriented to person, place, and time.   Psychiatric:         Mood and Affect: Mood normal.          PAT AIRWAY:   Airway:     Mallampati::  I    TM distance::  >3 FB    Neck ROM::   Full   upper dentures and lower dentures      Testing/Diagnostic: CBC, BMP, coag, A1c, staph/MRSA, ecg    Patient Specialist/PCP: Aracely Motley DO (PCP) 1/16/2025; Harish Marino MD (cardiology) 11/26/2024; Amee Amos MD (pain management) 12/10/2024; Susan Mayne APRN (dermatology) 4/27/2024    Visit Vitals  Pulse 92   Temp 35.8 °C (96.4 °F)   Resp 16   Ht 1.829 m (6')   Wt 118 kg (260 lb 2.3 oz)   SpO2 95%   BMI 35.28 kg/m²   Smoking Status Never   BSA 2.45 m²       DASI Risk Score      Flowsheet Row Pre-Admission Testing from [facility]/27/2025 in [facility]   Can you take care of yourself (eat, dress, bathe, or use toilet)?  2.75 filed at 01/27/2025 1045   Can you walk indoors, such as around your house? 1.75 filed at 01/27/2025 1045   Can you walk a block or two on level ground?  0 filed at 01/27/2025 1045   Can you climb a flight of stairs or walk up a hill? 5.5 filed at 01/27/2025 1045   Can you run a short distance? 0 filed at 01/27/2025 1045   Can you do light work around the house like dusting or washing dishes? 2.7 filed at 01/27/2025 1045   Can you do moderate work around the house like vacuuming, sweeping floors or carrying groceries? 3.5 filed at 01/27/2025 1045   Can you do heavy work around the house like scrubbing floors or lifting and moving heavy furniture?  8 filed at 01/27/2025 1045   Can you do yard work like raking leaves, weeding or pushing a mower? 0 filed at 01/27/2025 1045   Can you have sexual relations? 0 filed at 01/27/2025 1045   Can you participate in moderate recreational activities like golf, bowling, dancing, doubles tennis or throwing a baseball or football? 0 filed at 01/27/2025 1045   Can you participate in strenous sports like swimming, singles tennis, football, basketball, or skiing? 0 filed at 01/27/2025 1045   DASI SCORE 24.2 filed at 01/27/2025 1045   METS Score (Will be calculated only when all the questions are answered) 5.7 filed at 01/27/2025 1045           Caprini DVT Assessment    No data to display       Modified Frailty Index    No data to display       CHADS2 Stroke Risk  Current as of about an hour ago        2.8% 3 to 100%: High Risk   2 to < 3%: Medium Risk   0 to < 2%: Low Risk     Last Change: N/A          This score determines the patient's risk of having a stroke if the patient has atrial fibrillation.          Points Metrics   0 Has Congestive Heart Failure:  No     Patients with congestive heart failure get 1 point.    Current as of about an hour ago   1 Has Hypertension:  Yes     Patients with hypertension get 1 point.    Current as of about an hour ago   0 Age:  69     Patients who are 75 years of age or older get 1 point.    Current as of about an hour ago   0 Has Diabetes Excluding Gestational Diabetes:  No     Patients with diabetes get 1 point.    Current as of about an hour ago   0 Had Stroke:  No  Had TIA:  No  Had Thromboembolism:  No     Patients who have had a stroke, TIA, or thromboembolism get 2 points.    Current as of about an hour ago             Revised Cardiac Risk Index    No data to display       Apfel Simplified Score    No data to display       Risk Analysis Index Results This Encounter    No data found in the last 10 encounters.       Stop Bang Score      Flowsheet Row Pre-Admission Testing [facility]/27/[facility]   Do you snore loudly? 1 filed at 01/27/2025 1045   Do you often feel tired or fatigued after your sleep? 0 filed at 01/27/2025 1045   Has anyone ever observed you stop breathing in your sleep? 0 filed at 01/27/2025 1045   Do you have or are you being treated for high blood pressure? 1 filed at 01/27/2025 1045   Recent BMI (Calculated) 35.3 filed at 01/27/2025 1045   Is BMI greater than 35 kg/m2? 1=Yes filed at 01/27/2025 1045   Age older than 50 years old? 1=Yes filed at 01/27/2025 1045   Is your neck circumference greater than 17 inches (Male) or 16 inches (Female)? 1 filed at 01/27/2025 1045    Gender - Male 1=Yes filed at 01/27/2025 1045   STOP-BANG Total Score 6 filed at 01/27/2025 1045          Prodigy: High Risk  Total Score: 16              Prodigy Age Score      Prodigy Gender Score          ARISCAT Score for Postoperative Pulmonary Complications    No data to display       Ruff Perioperative Risk for Myocardial Infarction or Cardiac Arrest (GINI)    No data to display         Assessment and Plan:     # A-fib -  hold Eliquis 3 days before surgery; followed by cardiology     # HTN - continue hctz, metoprolol; hold Lisinopril evening before and day of surgery  # HLD - continue atorvastatin  # Left knee OA - Left total knee replacement w/Dr Seth on 2/5/2025    Cardiac clearance provided (Harish Marino MD, 11/26/2024)  Medical clearance requested by ortho and pending    Ecg performed today - Sinus rhythm w/occasional PVCs (73 bpm)  Medical hx, Allergies, VS and Labs reviewed  Medications addressed w/pre-op instructions provided

## 2025-01-29 DIAGNOSIS — M17.12 PRIMARY OSTEOARTHRITIS OF LEFT KNEE: Primary | ICD-10-CM

## 2025-01-29 LAB — STAPHYLOCOCCUS SPEC CULT: ABNORMAL

## 2025-01-30 LAB
ATRIAL RATE: 73 BPM
P AXIS: 42 DEGREES
P OFFSET: 159 MS
P ONSET: 118 MS
PR INTERVAL: 184 MS
Q ONSET: 210 MS
QRS COUNT: 11 BEATS
QRS DURATION: 100 MS
QT INTERVAL: 402 MS
QTC CALCULATION(BAZETT): 442 MS
QTC FREDERICIA: 429 MS
R AXIS: -20 DEGREES
T AXIS: -13 DEGREES
T OFFSET: 411 MS
VENTRICULAR RATE: 73 BPM

## 2025-02-03 ENCOUNTER — APPOINTMENT (OUTPATIENT)
Dept: PAIN MEDICINE | Facility: CLINIC | Age: 70
End: 2025-02-03
Payer: MEDICARE

## 2025-02-03 VITALS
SYSTOLIC BLOOD PRESSURE: 117 MMHG | RESPIRATION RATE: 10 BRPM | BODY MASS INDEX: 34.46 KG/M2 | TEMPERATURE: 96.1 F | WEIGHT: 260 LBS | OXYGEN SATURATION: 95 % | HEART RATE: 76 BPM | HEIGHT: 73 IN | DIASTOLIC BLOOD PRESSURE: 68 MMHG

## 2025-02-03 DIAGNOSIS — M48.062 LUMBAR STENOSIS WITH NEUROGENIC CLAUDICATION: ICD-10-CM

## 2025-02-03 DIAGNOSIS — M96.1 POSTLAMINECTOMY SYNDROME OF LUMBAR REGION: Primary | ICD-10-CM

## 2025-02-03 PROCEDURE — 1125F AMNT PAIN NOTED PAIN PRSNT: CPT | Performed by: NURSE PRACTITIONER

## 2025-02-03 PROCEDURE — 3008F BODY MASS INDEX DOCD: CPT | Performed by: NURSE PRACTITIONER

## 2025-02-03 PROCEDURE — 3074F SYST BP LT 130 MM HG: CPT | Performed by: NURSE PRACTITIONER

## 2025-02-03 PROCEDURE — 1036F TOBACCO NON-USER: CPT | Performed by: NURSE PRACTITIONER

## 2025-02-03 PROCEDURE — 99213 OFFICE O/P EST LOW 20 MIN: CPT | Performed by: NURSE PRACTITIONER

## 2025-02-03 PROCEDURE — 3078F DIAST BP <80 MM HG: CPT | Performed by: NURSE PRACTITIONER

## 2025-02-03 PROCEDURE — 1159F MED LIST DOCD IN RCRD: CPT | Performed by: NURSE PRACTITIONER

## 2025-02-03 ASSESSMENT — LIFESTYLE VARIABLES
HOW OFTEN DO YOU HAVE A DRINK CONTAINING ALCOHOL: NEVER
HOW OFTEN DO YOU HAVE SIX OR MORE DRINKS ON ONE OCCASION: NEVER
AUDIT-C TOTAL SCORE: 0
SKIP TO QUESTIONS 9-10: 1
HOW MANY STANDARD DRINKS CONTAINING ALCOHOL DO YOU HAVE ON A TYPICAL DAY: PATIENT DOES NOT DRINK

## 2025-02-03 ASSESSMENT — ENCOUNTER SYMPTOMS
WHEEZING: 0
CHILLS: 0
PALPITATIONS: 0
LOSS OF SENSATION IN FEET: 1
DIARRHEA: 0
BACK PAIN: 0
FEVER: 0
CONFUSION: 0
OCCASIONAL FEELINGS OF UNSTEADINESS: 1
FREQUENCY: 0
DEPRESSION: 0
SHORTNESS OF BREATH: 0
NUMBNESS: 1
AGITATION: 0
CONSTIPATION: 0
DIZZINESS: 0
HEADACHES: 0
NAUSEA: 0
MYALGIAS: 0

## 2025-02-03 ASSESSMENT — PAIN SCALES - GENERAL
PAINLEVEL_OUTOF10: 7
PAINLEVEL_OUTOF10: 7

## 2025-02-03 ASSESSMENT — PAIN DESCRIPTION - DESCRIPTORS: DESCRIPTORS: ACHING

## 2025-02-03 ASSESSMENT — PAIN - FUNCTIONAL ASSESSMENT: PAIN_FUNCTIONAL_ASSESSMENT: 0-10

## 2025-02-03 ASSESSMENT — PATIENT HEALTH QUESTIONNAIRE - PHQ9
2. FEELING DOWN, DEPRESSED OR HOPELESS: NOT AT ALL
1. LITTLE INTEREST OR PLEASURE IN DOING THINGS: NOT AT ALL
SUM OF ALL RESPONSES TO PHQ9 QUESTIONS 1 & 2: 0

## 2025-02-03 NOTE — PROGRESS NOTES
Chief Complain  Follow-up for lower back pain rating to left lower extremity    History Of Present Illness  Navi Valdez is a 69 y.o. male here for lower back pain radiating to left anterior and lateral thigh. The patient rates the pain at 7 on a scale from 0-10 due to left knee pain.  The patient describes pain as dull, aching.  The pain is worsened by walking and prolonged standing  and is alleviated by medications nonsteroidal anti-inflammatory drugs, Tylenol, and prescribed pain medications, position change, cortisone injections, and sitting.  Since the last visit the pain has improved.    The patient denies any fever, chills, weight loss, weakness, bladder/ bowel incontinence, history of cancer, history of IV drug abuse, recent trauma.     Prior office visit:  12/10/2025 Dr. Linton  The patient was referred to us by Referring Provider: Dr. Seth. this is 68 y.o.  male with a past history of obesity BMI 35, hypertension, CAD, atrial fibrillation on Eliquis, left SANDRA, multiple arthritis in knees and hips.  Patient has history of L5-S1 laminectomy in 1979 at [facility] presenting with with significant lower back pain with major claudication in both lower extremities.  The patient walks only for 10 minutes and his legs give out on him.  Patient has no incontinence no saddle paresthesia no paralysis.  The patient works as a  and he is very busy every day does not come back home until in the evening and he cannot find place to do physical therapy.  I am going to give him brochures about core exercises and stretching exercises for the back to help him.  The patient rates his pain as 6/10 and easily goes up to 10/10 just by standing or walking      Procedures:  12/10/2024 caudal epidural steroid injection the patient has had a 80% % improvement in pain and function    Portions of record reviewed for pertinent issues: active problem list, medication list, allergies, family history, social history,  notes from last encounter, encounters, lab results, imaging and other available records.      I have personally reviewed the OARRS report for this patient. This report is scanned into the electronic medical record. I have considered the risks of abuse, dependence, addiction and diversion. It showed: Oxycodone 5 mg with tramadol 50 mg last year but minimal this year  OPIOID RISK ASSESSMENT SCORE 0/26  Aberrant behavior: None  My patient has no underlying substance abuse or alcohol abuse and there's no mental health conditions contributing to the patient's pain.    Past Medical History  He has a past medical history of A-fib (Multi), Anxiety, Arthritis, Hyperlipidemia, Hypertension, Personal history of other medical treatment, Personal history of other medical treatment, and Vision loss.    Surgical History  He has a past surgical history that includes Tonsillectomy; Hernia repair; Colonoscopy; Meniscectomy; Back surgery; Hip Arthroplasty; Joint replacement; Foot surgery; and Vasectomy.     Social History  He reports that he has never smoked. He has never been exposed to tobacco smoke. He has never used smokeless tobacco. He reports that he does not drink alcohol and does not use drugs.    Family History  Family History   Problem Relation Name Age of Onset    Lung cancer Mother      Heart disease Father          Allergies  Patient has no known allergies.    Review of Systems  Review of Systems   Constitutional:  Negative for chills and fever.   Respiratory:  Negative for shortness of breath and wheezing.    Cardiovascular:  Negative for chest pain and palpitations.   Gastrointestinal:  Negative for constipation, diarrhea and nausea.   Genitourinary:  Negative for frequency.   Musculoskeletal:  Negative for back pain and myalgias.        LBP radiates to left anterior thigh stops at knee. Lumbar injection improved LLE numbness and tingling.    Neurological:  Positive for numbness. Negative for dizziness and headaches.  "  Psychiatric/Behavioral:  Negative for agitation, confusion and suicidal ideas.         Physical Exam  Physical Exam  Constitutional:       Appearance: Normal appearance.          Comments: Lumbar decreased range of motion flexion extension, pain on palpation to bilateral lumbar paraspinals left side worse than right.  Slump test negative, bilateral lower extremity diminished reflexes and vibratory sensation.,  Sensation intact to light touch.  Denies any bowel or bladder incontinence or saddle paresthesia.   Musculoskeletal:      Lumbar back: Tenderness present. No spasms. Decreased range of motion.   Neurological:      General: No focal deficit present.      Mental Status: He is alert and oriented to person, place, and time.   Psychiatric:         Attention and Perception: Attention and perception normal.         Mood and Affect: Mood normal.         Behavior: Behavior normal.         Thought Content: Thought content normal.         Judgment: Judgment normal.           Last Recorded Vitals  Blood pressure 117/68, pulse 76, temperature 35.6 °C (96.1 °F), temperature source Temporal, resp. rate 10, height 1.854 m (6' 1\"), weight 118 kg (260 lb), SpO2 95%.    Reviewed Images  12/14/2024 MRI of the lumbar spine showed bone marrow edema and endplate changes worse at L4 and L5, severe degenerative changes L1-S1, spondylosis with:  laminectomy at the L5-S1 with scarring and fatty tissues with moderate to severe foraminal stenosis  Moderate to severe canal stenosis at L2-3 with ligamentum flavum hypertrophy with minimal bilateral foraminal stenosis  Severe canal stenosis at L3-4 with ligamentum flavum hypertrophy moderate to severe bilateral foraminal stenosis  Severe right L4-5 foraminal stenosis        FINDINGS:  There are 5 non rib-bearing lumbar vertebral bodies. The lowest  intervertebral disc will be labeled L5-S1.      Alignment: Dextroscoliosis and straightening of the normal lumbar  lordosis and stable grade 1 " L3-4, L4-5 and L5-S1 retrolisthesis.  Grade 1 L2-3 anterolisthesis.      Vertebrae/Intervertebral Discs: Stable mild chronic wedging of the  superior L1 vertebral body. Otherwise, the vertebral bodies  demonstrate expected and height. No evidence of an acute fracture.  Nonspecific heterogeneous appearance of the marrow signal. Small  scattered benign intraosseous hemangiomas are seen. Multilevel disc  desiccation and disc height loss with endplate degenerative changes  and osteophytic spurring. Disc height loss most pronounced at L4-5.  Mild Modic type 1 changes involving the L3-L4 endplates.      Postsurgical changes from prior S1 laminectomy.      Conus medullaris: The lower thoracic cord appears unremarkable. The  conus medullaris terminates at L1.      T12-L1: Disc bulge, facet arthrosis and ligamentum flavum hypertrophy  without significant spinal canal stenosis. No significant right and  minimal left neuroforaminal stenosis.      L1-2: Disc bulge with osteophyte spurring, prominent dorsal epidural  fat, facet arthrosis and ligamentum flavum hypertrophy with mild  spinal canal stenosis. Mild bilateral neuroforaminal stenosis.      L2-3: Disc bulge eccentric extending into the extraforaminal  compartment. There is osteophyte spurring. There is a left  extraforaminal annular fissure. Facet arthrosis, ligamentum flavum  hypertrophy and prominent epidural fat contributing to moderate to  severe spinal canal stenosis and mild to moderate left and mild right  neuroforaminal stenosis. There is effacement of the bilateral  subarticular recesses left worse than right with compression of the  traversing left-sided nerve root.      L3-4: Disc bulge asymmetric to the left, prominent epidural fat,  facet arthrosis and ligamentum flavum hypertrophy contributes to  severe spinal canal and moderate bilateral neuroforaminal stenosis,  left greater than right. There is effacement of the bilateral  subarticular recess with  compression of the bilateral traversing  nerve roots.      L4-5: Postsurgical changes from prior laminectomy. There is a disc  osteophyte complex. There is facet arthrosis and prominent epidural  fat. There is mild spinal canal stenosis. There is effacement of the  bilateral subarticular recesses. Severe right and moderate left  neuroforaminal stenosis.      L5-S1: Postsurgical changes from prior right hemilaminectomy. There  is facet arthrosis. Epidural fat contributes to moderate effacement  of the thecal sac. There is effacement of the bilateral subarticular  recesses. Moderate to severe bilateral neuroforaminal stenosis.      Mild edema along the medial aspect of the left psoas muscle at the  level L3 (series 3, image 21). Otherwise, the prevertebral and  paraspinal soft tissues are unremarkable.      IMPRESSION:  1. Postsurgical changes from prior S1 laminectomy. There is mild  spinal canal stenosis at the L4-L5 level and effacement of the  bilateral subarticular recess. There is moderate effacement of the  thecal sac at L5-S1 level mostly secondary to prominent epidural  lipomatosis.  2. Additional multilevel degenerative changes of the lumbar spine  contributing to spinal canal stenosis most pronounced at L2-L3, L3-L4  (severe) and neuroforaminal stenosis most pronounced at left L2-L3,  bilateral L3-4, L4-5 and L5-S1.  3. Mild edema within the medial aspect of the right psoas muscle at  level of L3 may be reactive, recommend correlation with patient's  symptomatology and laboratory values to exclude an early infectious  process.      I personally reviewed the images/study and I agree with the findings  as stated by Resident Sade Curran.      MACRO:  Critical Finding:  See findings. Notification was initiated on  12/16/2024 at 7:31 pm by  Sade Curran.  (**-YCF-**)      Signed by: Elva Novoa 12/17/2024 6:33 AM     11/26/2024 left knee x-ray showed end-stage arthritis   Reviewed Labs  Lab Results   Component  Value Date    GLUCOSE 89 10/19/2024    CALCIUM 8.7 10/19/2024     10/19/2024    K 4.5 10/19/2024    CO2 30 10/19/2024     10/19/2024    BUN 25 (H) 10/19/2024    CREATININE 0.99 10/19/2024         Assessment/Plan     Navi Valdez is a 69 y.o. male recalled past medical history of obesity, hypertension, CAD, A-fib on Eliquis, left SANDRA, left knee arthritis, L5 this S1 laminectomy in 1979 at [facility] who is here for follow-up of lower back pain radiating to bilateral lower extremities left side worse than right.  He was treated with caudal epidural steroid injection which relieved 80% of his neuropathic pain.  The injection significantly improves his quality of life and allows him to continue walking and standing without pain.  Of note patient continues to have left knee pain and is scheduled for left TKR on 2/5/2025 with Dr. Seth.  Patient currently works as a  and plans to retire after knee replacement.  Overall lower back symptoms significantly improved.  He denies any new neurological or constitutional symptoms.  Reports significant improvement in neuropathic pain, unable to reproduce neuropathic pain on exam.  Plan to continue with current pain medication as prescribed.  Given the significant improvement in neuropathic pain,will consider repeating fluoroscopy guided caudal epidural steroid injection in the future.  All questions and concerns answered.  Plan to follow-up in 3 months or sooner if needed.      Plan  At least 50% of the visit was involved in the discussion of the options for treatment. We discussed exercises, medication, interventional therapies and surgery. Healthy life style is essential with patient hard work to achieve the wellness. In addition; discussion with the patient and/or family about any of the diagnostic results, impressions and/or recommended diagnostic studies, prognosis, risks and benefits of treatment options, instructions for treatment and/or  follow-up, importance of compliance with chosen treatment options, risk-factor reduction, and patient/family education.     Will consider repeating fluoroscopy guided caudal epidural steroid injection  Continue self-directed physical therapy  Instructed patient that surgical team will manage postop pain for his knee replacement.  Healthy lifestyle and anti-inflammatory diet in addition to weight control discussed with the patient  Alternative chronic pain therapies was discussed, encouraged and information was handed  Return to Clinic 3 months or sooner     *Please note this report has been produced using speech recognition software and may contain errors related to that system including grammar, punctuation and spelling as well as words and phrases that may be inappropriate. If there are questions or concerns, please feel free to contact me to clarify.      I spent 21 minutes in the professional and overall care of this patient.       Frank Newell, APRN-CNP

## 2025-02-04 ENCOUNTER — ANESTHESIA EVENT (OUTPATIENT)
Dept: OPERATING ROOM | Facility: HOSPITAL | Age: 70
End: 2025-02-04
Payer: MEDICARE

## 2025-02-05 ENCOUNTER — ANESTHESIA (OUTPATIENT)
Dept: OPERATING ROOM | Facility: HOSPITAL | Age: 70
End: 2025-02-05
Payer: MEDICARE

## 2025-02-05 ENCOUNTER — HOSPITAL ENCOUNTER (OUTPATIENT)
Facility: HOSPITAL | Age: 70
Setting detail: OUTPATIENT SURGERY
Discharge: HOME | End: 2025-02-05
Attending: ORTHOPAEDIC SURGERY | Admitting: ORTHOPAEDIC SURGERY
Payer: MEDICARE

## 2025-02-05 ENCOUNTER — PREP FOR PROCEDURE (OUTPATIENT)
Dept: ORTHOPEDIC SURGERY | Facility: CLINIC | Age: 70
End: 2025-02-05

## 2025-02-05 DIAGNOSIS — M17.12 ARTHRITIS OF LEFT KNEE: Primary | ICD-10-CM

## 2025-02-05 RX ORDER — GABAPENTIN 300 MG/1
300 CAPSULE ORAL ONCE
Status: DISCONTINUED | OUTPATIENT
Start: 2025-02-05 | End: 2025-02-05 | Stop reason: HOSPADM

## 2025-02-05 RX ORDER — PROCHLORPERAZINE EDISYLATE 5 MG/ML
5 INJECTION INTRAMUSCULAR; INTRAVENOUS ONCE AS NEEDED
Status: CANCELLED | OUTPATIENT
Start: 2025-02-05

## 2025-02-05 RX ORDER — CELECOXIB 100 MG/1
100 CAPSULE ORAL ONCE
Status: DISCONTINUED | OUTPATIENT
Start: 2025-02-05 | End: 2025-02-05 | Stop reason: HOSPADM

## 2025-02-05 RX ORDER — IPRATROPIUM BROMIDE 0.5 MG/2.5ML
500 SOLUTION RESPIRATORY (INHALATION) ONCE
Status: CANCELLED | OUTPATIENT
Start: 2025-02-05 | End: 2025-02-05

## 2025-02-05 RX ORDER — ONDANSETRON HYDROCHLORIDE 2 MG/ML
4 INJECTION, SOLUTION INTRAVENOUS ONCE AS NEEDED
Status: CANCELLED | OUTPATIENT
Start: 2025-02-05

## 2025-02-05 RX ORDER — CEFAZOLIN SODIUM 2 G/100ML
2 INJECTION, SOLUTION INTRAVENOUS ONCE
Status: DISCONTINUED | OUTPATIENT
Start: 2025-02-05 | End: 2025-02-05 | Stop reason: HOSPADM

## 2025-02-05 RX ORDER — MEPERIDINE HYDROCHLORIDE 50 MG/ML
12.5 INJECTION INTRAMUSCULAR; INTRAVENOUS; SUBCUTANEOUS EVERY 10 MIN PRN
Status: CANCELLED | OUTPATIENT
Start: 2025-02-05

## 2025-02-05 RX ORDER — TRAMADOL HYDROCHLORIDE 50 MG/1
50 TABLET ORAL ONCE
Status: DISCONTINUED | OUTPATIENT
Start: 2025-02-05 | End: 2025-02-05 | Stop reason: HOSPADM

## 2025-02-05 RX ORDER — CELECOXIB 100 MG/1
200 CAPSULE ORAL ONCE
Status: DISCONTINUED | OUTPATIENT
Start: 2025-02-05 | End: 2025-02-05 | Stop reason: HOSPADM

## 2025-02-05 RX ORDER — ACETAMINOPHEN 325 MG/1
650 TABLET ORAL EVERY 4 HOURS PRN
Status: CANCELLED | OUTPATIENT
Start: 2025-02-05

## 2025-02-05 RX ORDER — SODIUM CHLORIDE, SODIUM LACTATE, POTASSIUM CHLORIDE, CALCIUM CHLORIDE 600; 310; 30; 20 MG/100ML; MG/100ML; MG/100ML; MG/100ML
100 INJECTION, SOLUTION INTRAVENOUS CONTINUOUS
Status: CANCELLED | OUTPATIENT
Start: 2025-02-05 | End: 2025-02-05

## 2025-02-05 RX ORDER — ALBUTEROL SULFATE 0.83 MG/ML
2.5 SOLUTION RESPIRATORY (INHALATION) ONCE AS NEEDED
Status: CANCELLED | OUTPATIENT
Start: 2025-02-05

## 2025-02-05 RX ORDER — SODIUM CHLORIDE, SODIUM LACTATE, POTASSIUM CHLORIDE, CALCIUM CHLORIDE 600; 310; 30; 20 MG/100ML; MG/100ML; MG/100ML; MG/100ML
100 INJECTION, SOLUTION INTRAVENOUS CONTINUOUS
OUTPATIENT
Start: 2025-02-05 | End: 2025-02-06

## 2025-02-05 RX ORDER — TRANEXAMIC ACID 10 MG/ML
1000 INJECTION, SOLUTION INTRAVENOUS
Status: DISCONTINUED | OUTPATIENT
Start: 2025-02-05 | End: 2025-02-05 | Stop reason: HOSPADM

## 2025-02-05 RX ORDER — CEFAZOLIN SODIUM 2 G/100ML
2 INJECTION, SOLUTION INTRAVENOUS ONCE
OUTPATIENT
Start: 2025-02-05 | End: 2025-02-05

## 2025-02-05 RX ORDER — LIDOCAINE HYDROCHLORIDE 10 MG/ML
0.1 INJECTION, SOLUTION INFILTRATION; PERINEURAL ONCE
Status: CANCELLED | OUTPATIENT
Start: 2025-02-05 | End: 2025-02-05

## 2025-02-05 RX ORDER — ACETAMINOPHEN 325 MG/1
650 TABLET ORAL ONCE
Status: DISCONTINUED | OUTPATIENT
Start: 2025-02-05 | End: 2025-02-05 | Stop reason: HOSPADM

## 2025-02-05 SDOH — HEALTH STABILITY: MENTAL HEALTH: CURRENT SMOKER: 0

## 2025-02-05 NOTE — ANESTHESIA PREPROCEDURE EVALUATION
Patient: Navi Valdez    Procedure Information       Date/Time: 02/05/25 2481    Procedure: LEFT TOTAL KNEE REPLACEMENT (Left: Knee)    Location: [facility] 08 / [facility]    Surgeons: Edison Seth MD            Relevant Problems   Cardiac   (+) Atherosclerosis of coronary artery   (+) Atrial fibrillation (Multi)   (+) HTN (hypertension)   (+) Hyperlipidemia, mixed      Endocrine   (+) Class 1 obesity with body mass index (BMI) of 34.0 to 34.9 in adult       Clinical information reviewed:      Problems              NPO Detail:  No data recorded     Physical Exam    Airway  Mallampati: II  TM distance: >3 FB  Neck ROM: full     Cardiovascular   Rhythm: irregular  Rate: normal     Dental - normal exam  (+) upper dentures, lower dentures     Pulmonary - normal exam     Abdominal            Anesthesia Plan    History of general anesthesia?: yes  History of complications of general anesthesia?: no    ASA 3     regional, MAC and spinal     The patient is not a current smoker.  Education provided regarding risk of obstructive sleep apnea.  intravenous induction   Postoperative administration of opioids is intended.  Trial extubation is planned.  Anesthetic plan and risks discussed with patient.  Use of blood products discussed with patient who consented to blood products.    Plan discussed with attending.

## 2025-02-07 ENCOUNTER — APPOINTMENT (OUTPATIENT)
Dept: RADIOLOGY | Facility: HOSPITAL | Age: 70
End: 2025-02-07
Payer: MEDICARE

## 2025-02-07 ENCOUNTER — ANESTHESIA EVENT (OUTPATIENT)
Dept: OPERATING ROOM | Facility: HOSPITAL | Age: 70
End: 2025-02-07
Payer: MEDICARE

## 2025-02-07 ENCOUNTER — HOSPITAL ENCOUNTER (OUTPATIENT)
Facility: HOSPITAL | Age: 70
Discharge: HOME HEALTH | End: 2025-02-08
Attending: ORTHOPAEDIC SURGERY | Admitting: ORTHOPAEDIC SURGERY
Payer: MEDICARE

## 2025-02-07 ENCOUNTER — PHARMACY VISIT (OUTPATIENT)
Dept: PHARMACY | Facility: CLINIC | Age: 70
End: 2025-02-07
Payer: COMMERCIAL

## 2025-02-07 ENCOUNTER — ANESTHESIA (OUTPATIENT)
Dept: OPERATING ROOM | Facility: HOSPITAL | Age: 70
End: 2025-02-07
Payer: MEDICARE

## 2025-02-07 DIAGNOSIS — M17.12 ARTHRITIS OF LEFT KNEE: ICD-10-CM

## 2025-02-07 PROCEDURE — 7100000002 HC RECOVERY ROOM TIME - EACH INCREMENTAL 1 MINUTE: Performed by: ORTHOPAEDIC SURGERY

## 2025-02-07 PROCEDURE — C1776 JOINT DEVICE (IMPLANTABLE): HCPCS | Performed by: ORTHOPAEDIC SURGERY

## 2025-02-07 PROCEDURE — 2780000003 HC OR 278 NO HCPCS: Performed by: ORTHOPAEDIC SURGERY

## 2025-02-07 PROCEDURE — 2500000004 HC RX 250 GENERAL PHARMACY W/ HCPCS (ALT 636 FOR OP/ED): Performed by: ANESTHESIOLOGY

## 2025-02-07 PROCEDURE — 2500000005 HC RX 250 GENERAL PHARMACY W/O HCPCS: Performed by: ORTHOPAEDIC SURGERY

## 2025-02-07 PROCEDURE — 3700000001 HC GENERAL ANESTHESIA TIME - INITIAL BASE CHARGE: Performed by: ORTHOPAEDIC SURGERY

## 2025-02-07 PROCEDURE — 7100000001 HC RECOVERY ROOM TIME - INITIAL BASE CHARGE: Performed by: ORTHOPAEDIC SURGERY

## 2025-02-07 PROCEDURE — 2500000004 HC RX 250 GENERAL PHARMACY W/ HCPCS (ALT 636 FOR OP/ED): Performed by: ORTHOPAEDIC SURGERY

## 2025-02-07 PROCEDURE — RXMED WILLOW AMBULATORY MEDICATION CHARGE

## 2025-02-07 PROCEDURE — 2500000005 HC RX 250 GENERAL PHARMACY W/O HCPCS: Performed by: NURSE ANESTHETIST, CERTIFIED REGISTERED

## 2025-02-07 PROCEDURE — C1713 ANCHOR/SCREW BN/BN,TIS/BN: HCPCS | Performed by: ORTHOPAEDIC SURGERY

## 2025-02-07 PROCEDURE — 2500000001 HC RX 250 WO HCPCS SELF ADMINISTERED DRUGS (ALT 637 FOR MEDICARE OP): Performed by: ANESTHESIOLOGY

## 2025-02-07 PROCEDURE — 73560 X-RAY EXAM OF KNEE 1 OR 2: CPT | Mod: LEFT SIDE | Performed by: RADIOLOGY

## 2025-02-07 PROCEDURE — 7100000011 HC EXTENDED STAY RECOVERY HOURLY - NURSING UNIT

## 2025-02-07 PROCEDURE — 2720000007 HC OR 272 NO HCPCS: Performed by: ORTHOPAEDIC SURGERY

## 2025-02-07 PROCEDURE — 3600000010 HC OR TIME - EACH INCREMENTAL 1 MINUTE - PROCEDURE LEVEL FIVE: Performed by: ORTHOPAEDIC SURGERY

## 2025-02-07 PROCEDURE — 3600000005 HC OR TIME - INITIAL BASE CHARGE - PROCEDURE LEVEL FIVE: Performed by: ORTHOPAEDIC SURGERY

## 2025-02-07 PROCEDURE — 73560 X-RAY EXAM OF KNEE 1 OR 2: CPT | Mod: LT

## 2025-02-07 PROCEDURE — 97161 PT EVAL LOW COMPLEX 20 MIN: CPT | Mod: GP

## 2025-02-07 PROCEDURE — 99222 1ST HOSP IP/OBS MODERATE 55: CPT | Performed by: INTERNAL MEDICINE

## 2025-02-07 PROCEDURE — 2500000001 HC RX 250 WO HCPCS SELF ADMINISTERED DRUGS (ALT 637 FOR MEDICARE OP): Performed by: ORTHOPAEDIC SURGERY

## 2025-02-07 PROCEDURE — 2500000004 HC RX 250 GENERAL PHARMACY W/ HCPCS (ALT 636 FOR OP/ED): Performed by: NURSE ANESTHETIST, CERTIFIED REGISTERED

## 2025-02-07 PROCEDURE — 3700000002 HC GENERAL ANESTHESIA TIME - EACH INCREMENTAL 1 MINUTE: Performed by: ORTHOPAEDIC SURGERY

## 2025-02-07 PROCEDURE — 27447 TOTAL KNEE ARTHROPLASTY: CPT | Performed by: ORTHOPAEDIC SURGERY

## 2025-02-07 PROCEDURE — 97165 OT EVAL LOW COMPLEX 30 MIN: CPT | Mod: GO

## 2025-02-07 DEVICE — ATTUNE KNEE SYSTEM FEMORAL POSTERIOR STABILIZED SIZE 9 LEFT CEMENTED
Type: IMPLANTABLE DEVICE | Site: KNEE | Status: FUNCTIONAL
Brand: ATTUNE

## 2025-02-07 DEVICE — IMPLANTABLE DEVICE
Type: IMPLANTABLE DEVICE | Site: KNEE | Status: FUNCTIONAL
Brand: BIOMET® BONE CEMENT R

## 2025-02-07 DEVICE — ATTUNE PATELLA MEDIALIZED DOME 41MM CEMENTED AOX
Type: IMPLANTABLE DEVICE | Site: KNEE | Status: FUNCTIONAL
Brand: ATTUNE

## 2025-02-07 RX ORDER — ONDANSETRON HYDROCHLORIDE 2 MG/ML
INJECTION, SOLUTION INTRAVENOUS AS NEEDED
Status: DISCONTINUED | OUTPATIENT
Start: 2025-02-07 | End: 2025-02-07

## 2025-02-07 RX ORDER — ATORVASTATIN CALCIUM 20 MG/1
20 TABLET, FILM COATED ORAL DAILY
Status: DISCONTINUED | OUTPATIENT
Start: 2025-02-07 | End: 2025-02-08 | Stop reason: HOSPADM

## 2025-02-07 RX ORDER — METOPROLOL SUCCINATE 50 MG/1
100 TABLET, EXTENDED RELEASE ORAL DAILY
Status: DISCONTINUED | OUTPATIENT
Start: 2025-02-07 | End: 2025-02-08 | Stop reason: HOSPADM

## 2025-02-07 RX ORDER — TRAMADOL HYDROCHLORIDE 50 MG/1
50 TABLET ORAL EVERY 6 HOURS PRN
Qty: 28 TABLET | Refills: 0 | Status: SHIPPED | OUTPATIENT
Start: 2025-02-07 | End: 2025-02-14

## 2025-02-07 RX ORDER — ALBUTEROL SULFATE 0.83 MG/ML
2.5 SOLUTION RESPIRATORY (INHALATION) ONCE AS NEEDED
Status: DISCONTINUED | OUTPATIENT
Start: 2025-02-07 | End: 2025-02-07 | Stop reason: HOSPADM

## 2025-02-07 RX ORDER — ONDANSETRON HYDROCHLORIDE 2 MG/ML
4 INJECTION, SOLUTION INTRAVENOUS ONCE AS NEEDED
Status: DISCONTINUED | OUTPATIENT
Start: 2025-02-07 | End: 2025-02-07 | Stop reason: HOSPADM

## 2025-02-07 RX ORDER — PROMETHAZINE HYDROCHLORIDE 25 MG/1
25 TABLET ORAL EVERY 6 HOURS PRN
Status: DISCONTINUED | OUTPATIENT
Start: 2025-02-07 | End: 2025-02-08 | Stop reason: HOSPADM

## 2025-02-07 RX ORDER — HYDROCHLOROTHIAZIDE 25 MG/1
25 TABLET ORAL DAILY
Status: DISCONTINUED | OUTPATIENT
Start: 2025-02-07 | End: 2025-02-08 | Stop reason: HOSPADM

## 2025-02-07 RX ORDER — DOCUSATE SODIUM 100 MG/1
100 CAPSULE, LIQUID FILLED ORAL 2 TIMES DAILY
Status: DISCONTINUED | OUTPATIENT
Start: 2025-02-07 | End: 2025-02-08 | Stop reason: HOSPADM

## 2025-02-07 RX ORDER — PROPOFOL 10 MG/ML
INJECTION, EMULSION INTRAVENOUS AS NEEDED
Status: DISCONTINUED | OUTPATIENT
Start: 2025-02-07 | End: 2025-02-07

## 2025-02-07 RX ORDER — ONDANSETRON 4 MG/1
4 TABLET, FILM COATED ORAL EVERY 8 HOURS PRN
Status: DISCONTINUED | OUTPATIENT
Start: 2025-02-07 | End: 2025-02-08 | Stop reason: HOSPADM

## 2025-02-07 RX ORDER — OXYCODONE HYDROCHLORIDE 5 MG/1
10 TABLET ORAL EVERY 4 HOURS PRN
Status: DISCONTINUED | OUTPATIENT
Start: 2025-02-07 | End: 2025-02-08 | Stop reason: HOSPADM

## 2025-02-07 RX ORDER — SODIUM CHLORIDE, SODIUM LACTATE, POTASSIUM CHLORIDE, CALCIUM CHLORIDE 600; 310; 30; 20 MG/100ML; MG/100ML; MG/100ML; MG/100ML
50 INJECTION, SOLUTION INTRAVENOUS CONTINUOUS
Status: DISCONTINUED | OUTPATIENT
Start: 2025-02-07 | End: 2025-02-07

## 2025-02-07 RX ORDER — MEPERIDINE HYDROCHLORIDE 50 MG/ML
12.5 INJECTION INTRAMUSCULAR; INTRAVENOUS; SUBCUTANEOUS EVERY 10 MIN PRN
Status: DISCONTINUED | OUTPATIENT
Start: 2025-02-07 | End: 2025-02-07 | Stop reason: HOSPADM

## 2025-02-07 RX ORDER — NORETHINDRONE AND ETHINYL ESTRADIOL 0.5-0.035
KIT ORAL AS NEEDED
Status: DISCONTINUED | OUTPATIENT
Start: 2025-02-07 | End: 2025-02-07

## 2025-02-07 RX ORDER — CELECOXIB 100 MG/1
100 CAPSULE ORAL ONCE
Status: COMPLETED | OUTPATIENT
Start: 2025-02-07 | End: 2025-02-07

## 2025-02-07 RX ORDER — MIDAZOLAM HYDROCHLORIDE 1 MG/ML
INJECTION, SOLUTION INTRAMUSCULAR; INTRAVENOUS AS NEEDED
Status: DISCONTINUED | OUTPATIENT
Start: 2025-02-07 | End: 2025-02-07

## 2025-02-07 RX ORDER — TALC
3 POWDER (GRAM) TOPICAL NIGHTLY PRN
Status: DISCONTINUED | OUTPATIENT
Start: 2025-02-07 | End: 2025-02-08 | Stop reason: HOSPADM

## 2025-02-07 RX ORDER — ONDANSETRON HYDROCHLORIDE 2 MG/ML
4 INJECTION, SOLUTION INTRAVENOUS EVERY 8 HOURS PRN
Status: DISCONTINUED | OUTPATIENT
Start: 2025-02-07 | End: 2025-02-08 | Stop reason: HOSPADM

## 2025-02-07 RX ORDER — LISINOPRIL 40 MG/1
40 TABLET ORAL DAILY
Status: DISCONTINUED | OUTPATIENT
Start: 2025-02-07 | End: 2025-02-08 | Stop reason: HOSPADM

## 2025-02-07 RX ORDER — FENTANYL CITRATE 50 UG/ML
INJECTION, SOLUTION INTRAMUSCULAR; INTRAVENOUS AS NEEDED
Status: DISCONTINUED | OUTPATIENT
Start: 2025-02-07 | End: 2025-02-07

## 2025-02-07 RX ORDER — SODIUM CHLORIDE, SODIUM LACTATE, POTASSIUM CHLORIDE, CALCIUM CHLORIDE 600; 310; 30; 20 MG/100ML; MG/100ML; MG/100ML; MG/100ML
100 INJECTION, SOLUTION INTRAVENOUS CONTINUOUS
Status: DISCONTINUED | OUTPATIENT
Start: 2025-02-07 | End: 2025-02-07 | Stop reason: HOSPADM

## 2025-02-07 RX ORDER — IPRATROPIUM BROMIDE 0.5 MG/2.5ML
500 SOLUTION RESPIRATORY (INHALATION) ONCE
Status: DISCONTINUED | OUTPATIENT
Start: 2025-02-07 | End: 2025-02-07 | Stop reason: HOSPADM

## 2025-02-07 RX ORDER — ACETAMINOPHEN 325 MG/1
650 TABLET ORAL EVERY 6 HOURS SCHEDULED
Status: DISCONTINUED | OUTPATIENT
Start: 2025-02-07 | End: 2025-02-08 | Stop reason: HOSPADM

## 2025-02-07 RX ORDER — LIDOCAINE HYDROCHLORIDE 10 MG/ML
0.1 INJECTION, SOLUTION INFILTRATION; PERINEURAL ONCE
Status: DISCONTINUED | OUTPATIENT
Start: 2025-02-07 | End: 2025-02-07 | Stop reason: HOSPADM

## 2025-02-07 RX ORDER — LIDOCAINE HCL/PF 100 MG/5ML
SYRINGE (ML) INTRAVENOUS AS NEEDED
Status: DISCONTINUED | OUTPATIENT
Start: 2025-02-07 | End: 2025-02-07

## 2025-02-07 RX ORDER — NALOXONE HYDROCHLORIDE 1 MG/ML
0.2 INJECTION INTRAMUSCULAR; INTRAVENOUS; SUBCUTANEOUS EVERY 5 MIN PRN
Status: DISCONTINUED | OUTPATIENT
Start: 2025-02-07 | End: 2025-02-08 | Stop reason: HOSPADM

## 2025-02-07 RX ORDER — CEFAZOLIN SODIUM/D5W 2 G/100 ML
2 PLASTIC BAG, INJECTION (ML) INTRAVENOUS ONCE
Status: COMPLETED | OUTPATIENT
Start: 2025-02-07 | End: 2025-02-07

## 2025-02-07 RX ORDER — GABAPENTIN 300 MG/1
300 CAPSULE ORAL ONCE
Status: COMPLETED | OUTPATIENT
Start: 2025-02-07 | End: 2025-02-07

## 2025-02-07 RX ORDER — CEFAZOLIN SODIUM 2 G/100ML
INJECTION, SOLUTION INTRAVENOUS
Status: DISPENSED
Start: 2025-02-07 | End: 2025-02-07

## 2025-02-07 RX ORDER — TRANEXAMIC ACID 100 MG/ML
1000 INJECTION, SOLUTION INTRAVENOUS ONCE
OUTPATIENT
Start: 2025-02-07 | End: 2025-02-07

## 2025-02-07 RX ORDER — TRAMADOL HYDROCHLORIDE 50 MG/1
50 TABLET ORAL 3 TIMES DAILY
Status: DISCONTINUED | OUTPATIENT
Start: 2025-02-07 | End: 2025-02-08 | Stop reason: HOSPADM

## 2025-02-07 RX ORDER — TRANEXAMIC ACID 10 MG/ML
INJECTION, SOLUTION INTRAVENOUS AS NEEDED
Status: DISCONTINUED | OUTPATIENT
Start: 2025-02-07 | End: 2025-02-07

## 2025-02-07 RX ORDER — TRANEXAMIC ACID 10 MG/ML
INJECTION, SOLUTION INTRAVENOUS
Status: COMPLETED
Start: 2025-02-07 | End: 2025-02-07

## 2025-02-07 RX ORDER — SODIUM CHLORIDE 0.9 G/100ML
INJECTION, SOLUTION IRRIGATION AS NEEDED
Status: DISCONTINUED | OUTPATIENT
Start: 2025-02-07 | End: 2025-02-07 | Stop reason: HOSPADM

## 2025-02-07 RX ORDER — OXYCODONE HYDROCHLORIDE 5 MG/1
2.5 TABLET ORAL EVERY 4 HOURS PRN
Status: DISCONTINUED | OUTPATIENT
Start: 2025-02-07 | End: 2025-02-08 | Stop reason: HOSPADM

## 2025-02-07 RX ORDER — ACETAMINOPHEN 325 MG/1
1000 TABLET ORAL 3 TIMES DAILY
Start: 2025-02-07

## 2025-02-07 RX ORDER — OXYCODONE HYDROCHLORIDE 5 MG/1
5 TABLET ORAL EVERY 6 HOURS PRN
Status: DISCONTINUED | OUTPATIENT
Start: 2025-02-07 | End: 2025-02-08 | Stop reason: HOSPADM

## 2025-02-07 RX ORDER — ACETAMINOPHEN 325 MG/1
650 TABLET ORAL ONCE
Status: COMPLETED | OUTPATIENT
Start: 2025-02-07 | End: 2025-02-07

## 2025-02-07 RX ORDER — POLYETHYLENE GLYCOL 3350 17 G/17G
17 POWDER, FOR SOLUTION ORAL DAILY
Status: DISCONTINUED | OUTPATIENT
Start: 2025-02-07 | End: 2025-02-08 | Stop reason: HOSPADM

## 2025-02-07 RX ORDER — CYCLOBENZAPRINE HCL 10 MG
10 TABLET ORAL 3 TIMES DAILY PRN
Status: DISCONTINUED | OUTPATIENT
Start: 2025-02-07 | End: 2025-02-08 | Stop reason: HOSPADM

## 2025-02-07 RX ORDER — CEFAZOLIN SODIUM 2 G/100ML
2 INJECTION, SOLUTION INTRAVENOUS EVERY 8 HOURS
Status: COMPLETED | OUTPATIENT
Start: 2025-02-07 | End: 2025-02-08

## 2025-02-07 RX ORDER — PROCHLORPERAZINE EDISYLATE 5 MG/ML
5 INJECTION INTRAMUSCULAR; INTRAVENOUS ONCE AS NEEDED
Status: DISCONTINUED | OUTPATIENT
Start: 2025-02-07 | End: 2025-02-07 | Stop reason: HOSPADM

## 2025-02-07 RX ORDER — OXYCODONE HYDROCHLORIDE 5 MG/1
5-10 TABLET ORAL EVERY 6 HOURS PRN
Qty: 28 TABLET | Refills: 0 | Status: SHIPPED | OUTPATIENT
Start: 2025-02-07 | End: 2025-02-14

## 2025-02-07 RX ORDER — ZOLPIDEM TARTRATE 5 MG/1
5 TABLET ORAL NIGHTLY PRN
Status: DISCONTINUED | OUTPATIENT
Start: 2025-02-07 | End: 2025-02-08 | Stop reason: HOSPADM

## 2025-02-07 RX ORDER — ACETAMINOPHEN 325 MG/1
650 TABLET ORAL EVERY 4 HOURS PRN
Status: DISCONTINUED | OUTPATIENT
Start: 2025-02-07 | End: 2025-02-07 | Stop reason: HOSPADM

## 2025-02-07 RX ORDER — POLYETHYLENE GLYCOL 3350 17 G/17G
17 POWDER, FOR SOLUTION ORAL DAILY PRN
Start: 2025-02-07

## 2025-02-07 RX ORDER — DOCUSATE SODIUM 100 MG/1
100 CAPSULE, LIQUID FILLED ORAL 2 TIMES DAILY
Qty: 28 CAPSULE | Refills: 0 | Status: SHIPPED | OUTPATIENT
Start: 2025-02-07 | End: 2025-02-21

## 2025-02-07 RX ORDER — FENTANYL CITRATE 50 UG/ML
INJECTION, SOLUTION INTRAMUSCULAR; INTRAVENOUS
Status: COMPLETED
Start: 2025-02-07 | End: 2025-02-07

## 2025-02-07 RX ORDER — MIDAZOLAM HYDROCHLORIDE 1 MG/ML
INJECTION, SOLUTION INTRAMUSCULAR; INTRAVENOUS
Status: COMPLETED
Start: 2025-02-07 | End: 2025-02-07

## 2025-02-07 RX ORDER — PROMETHAZINE HYDROCHLORIDE 25 MG/1
25 SUPPOSITORY RECTAL EVERY 12 HOURS PRN
Status: DISCONTINUED | OUTPATIENT
Start: 2025-02-07 | End: 2025-02-08 | Stop reason: HOSPADM

## 2025-02-07 RX ORDER — SODIUM CHLORIDE, SODIUM LACTATE, POTASSIUM CHLORIDE, CALCIUM CHLORIDE 600; 310; 30; 20 MG/100ML; MG/100ML; MG/100ML; MG/100ML
100 INJECTION, SOLUTION INTRAVENOUS CONTINUOUS
Status: ACTIVE | OUTPATIENT
Start: 2025-02-07 | End: 2025-02-08

## 2025-02-07 RX ADMIN — MIDAZOLAM 2 MG: 1 INJECTION INTRAMUSCULAR; INTRAVENOUS at 08:24

## 2025-02-07 RX ADMIN — PROPOFOL 100 MCG/KG/MIN: 10 INJECTION, EMULSION INTRAVENOUS at 09:36

## 2025-02-07 RX ADMIN — POLYETHYLENE GLYCOL 3350 17 G: 17 POWDER, FOR SOLUTION ORAL at 15:05

## 2025-02-07 RX ADMIN — LIDOCAINE HYDROCHLORIDE 60 MG: 20 INJECTION INTRAVENOUS at 09:35

## 2025-02-07 RX ADMIN — CEFAZOLIN SODIUM 3 G: 2 INJECTION, SOLUTION INTRAVENOUS at 09:30

## 2025-02-07 RX ADMIN — EPHEDRINE SULFATE 10 MG: 50 INJECTION, SOLUTION INTRAVENOUS at 09:55

## 2025-02-07 RX ADMIN — GABAPENTIN 300 MG: 300 CAPSULE ORAL at 07:58

## 2025-02-07 RX ADMIN — OXYCODONE HYDROCHLORIDE 10 MG: 5 TABLET ORAL at 23:49

## 2025-02-07 RX ADMIN — ACETAMINOPHEN 650 MG: 325 TABLET, FILM COATED ORAL at 23:49

## 2025-02-07 RX ADMIN — OXYCODONE HYDROCHLORIDE 10 MG: 5 TABLET ORAL at 17:55

## 2025-02-07 RX ADMIN — ACETAMINOPHEN 650 MG: 325 TABLET, FILM COATED ORAL at 17:55

## 2025-02-07 RX ADMIN — TRAMADOL HYDROCHLORIDE 50 MG: 50 TABLET, COATED ORAL at 20:30

## 2025-02-07 RX ADMIN — Medication 2 L/MIN: at 14:00

## 2025-02-07 RX ADMIN — CELECOXIB 100 MG: 100 CAPSULE ORAL at 07:58

## 2025-02-07 RX ADMIN — SODIUM CHLORIDE, POTASSIUM CHLORIDE, SODIUM LACTATE AND CALCIUM CHLORIDE 50 ML/HR: 600; 310; 30; 20 INJECTION, SOLUTION INTRAVENOUS at 07:57

## 2025-02-07 RX ADMIN — EPHEDRINE SULFATE 10 MG: 50 INJECTION, SOLUTION INTRAVENOUS at 10:22

## 2025-02-07 RX ADMIN — FENTANYL CITRATE 100 MCG: 50 INJECTION, SOLUTION INTRAMUSCULAR; INTRAVENOUS at 08:24

## 2025-02-07 RX ADMIN — SODIUM CHLORIDE, POTASSIUM CHLORIDE, SODIUM LACTATE AND CALCIUM CHLORIDE: 600; 310; 30; 20 INJECTION, SOLUTION INTRAVENOUS at 11:40

## 2025-02-07 RX ADMIN — POVIDONE-IODINE 1 APPLICATION: 5 SOLUTION TOPICAL at 07:56

## 2025-02-07 RX ADMIN — TRANEXAMIC ACID 1000 MG: 10 INJECTION, SOLUTION INTRAVENOUS at 09:45

## 2025-02-07 RX ADMIN — TRANEXAMIC ACID 1000 MG: 10 INJECTION, SOLUTION INTRAVENOUS at 10:49

## 2025-02-07 RX ADMIN — DEXAMETHASONE SODIUM PHOSPHATE 4 MG: 4 INJECTION, SOLUTION INTRAMUSCULAR; INTRAVENOUS at 09:45

## 2025-02-07 RX ADMIN — HYDROMORPHONE HYDROCHLORIDE 0.5 MG: 1 INJECTION, SOLUTION INTRAMUSCULAR; INTRAVENOUS; SUBCUTANEOUS at 18:54

## 2025-02-07 RX ADMIN — MIDAZOLAM 2 MG: 1 INJECTION INTRAMUSCULAR; INTRAVENOUS at 09:20

## 2025-02-07 RX ADMIN — ACETAMINOPHEN 650 MG: 325 TABLET, FILM COATED ORAL at 15:05

## 2025-02-07 RX ADMIN — PROPOFOL 50 MG: 10 INJECTION, EMULSION INTRAVENOUS at 09:35

## 2025-02-07 RX ADMIN — DOCUSATE SODIUM 100 MG: 100 CAPSULE, LIQUID FILLED ORAL at 20:30

## 2025-02-07 RX ADMIN — TRAMADOL HYDROCHLORIDE 50 MG: 50 TABLET, COATED ORAL at 15:05

## 2025-02-07 RX ADMIN — ONDANSETRON 4 MG: 2 INJECTION INTRAMUSCULAR; INTRAVENOUS at 11:00

## 2025-02-07 RX ADMIN — ACETAMINOPHEN 650 MG: 325 TABLET, FILM COATED ORAL at 07:59

## 2025-02-07 RX ADMIN — DOCUSATE SODIUM 100 MG: 100 CAPSULE, LIQUID FILLED ORAL at 15:05

## 2025-02-07 RX ADMIN — CEFAZOLIN SODIUM 2 G: 2 INJECTION, SOLUTION INTRAVENOUS at 17:56

## 2025-02-07 RX ADMIN — SODIUM CHLORIDE, POTASSIUM CHLORIDE, SODIUM LACTATE AND CALCIUM CHLORIDE 100 ML/HR: 600; 310; 30; 20 INJECTION, SOLUTION INTRAVENOUS at 14:00

## 2025-02-07 SDOH — ECONOMIC STABILITY: FOOD INSECURITY: WITHIN THE PAST 12 MONTHS, YOU WORRIED THAT YOUR FOOD WOULD RUN OUT BEFORE YOU GOT THE MONEY TO BUY MORE.: NEVER TRUE

## 2025-02-07 SDOH — HEALTH STABILITY: PHYSICAL HEALTH: ON AVERAGE, HOW MANY MINUTES DO YOU ENGAGE IN EXERCISE AT THIS LEVEL?: 0 MIN

## 2025-02-07 SDOH — HEALTH STABILITY: PHYSICAL HEALTH
HOW OFTEN DO YOU NEED TO HAVE SOMEONE HELP YOU WHEN YOU READ INSTRUCTIONS, PAMPHLETS, OR OTHER WRITTEN MATERIAL FROM YOUR DOCTOR OR PHARMACY?: NEVER

## 2025-02-07 SDOH — SOCIAL STABILITY: SOCIAL INSECURITY: DO YOU FEEL ANYONE HAS EXPLOITED OR TAKEN ADVANTAGE OF YOU FINANCIALLY OR OF YOUR PERSONAL PROPERTY?: NO

## 2025-02-07 SDOH — SOCIAL STABILITY: SOCIAL INSECURITY: ARE THERE ANY APPARENT SIGNS OF INJURIES/BEHAVIORS THAT COULD BE RELATED TO ABUSE/NEGLECT?: NO

## 2025-02-07 SDOH — SOCIAL STABILITY: SOCIAL INSECURITY: HAS ANYONE EVER THREATENED TO HURT YOUR FAMILY OR YOUR PETS?: NO

## 2025-02-07 SDOH — SOCIAL STABILITY: SOCIAL NETWORK
DO YOU BELONG TO ANY CLUBS OR ORGANIZATIONS SUCH AS CHURCH GROUPS, UNIONS, FRATERNAL OR ATHLETIC GROUPS, OR SCHOOL GROUPS?: YES

## 2025-02-07 SDOH — ECONOMIC STABILITY: INCOME INSECURITY: IN THE PAST 12 MONTHS HAS THE ELECTRIC, GAS, OIL, OR WATER COMPANY THREATENED TO SHUT OFF SERVICES IN YOUR HOME?: NO

## 2025-02-07 SDOH — SOCIAL STABILITY: SOCIAL INSECURITY: WITHIN THE LAST YEAR, HAVE YOU BEEN HUMILIATED OR EMOTIONALLY ABUSED IN OTHER WAYS BY YOUR PARTNER OR EX-PARTNER?: NO

## 2025-02-07 SDOH — SOCIAL STABILITY: SOCIAL INSECURITY: HAVE YOU HAD ANY THOUGHTS OF HARMING ANYONE ELSE?: NO

## 2025-02-07 SDOH — SOCIAL STABILITY: SOCIAL INSECURITY: ARE YOU MARRIED, WIDOWED, DIVORCED, SEPARATED, NEVER MARRIED, OR LIVING WITH A PARTNER?: MARRIED

## 2025-02-07 SDOH — ECONOMIC STABILITY: FOOD INSECURITY: WITHIN THE PAST 12 MONTHS, THE FOOD YOU BOUGHT JUST DIDN'T LAST AND YOU DIDN'T HAVE MONEY TO GET MORE.: NEVER TRUE

## 2025-02-07 SDOH — SOCIAL STABILITY: SOCIAL NETWORK: HOW OFTEN DO YOU ATTEND MEETINGS OF THE CLUBS OR ORGANIZATIONS YOU BELONG TO?: MORE THAN 4 TIMES PER YEAR

## 2025-02-07 SDOH — HEALTH STABILITY: MENTAL HEALTH
DO YOU FEEL STRESS - TENSE, RESTLESS, NERVOUS, OR ANXIOUS, OR UNABLE TO SLEEP AT NIGHT BECAUSE YOUR MIND IS TROUBLED ALL THE TIME - THESE DAYS?: NOT AT ALL

## 2025-02-07 SDOH — SOCIAL STABILITY: SOCIAL INSECURITY
WITHIN THE LAST YEAR, HAVE YOU BEEN KICKED, HIT, SLAPPED, OR OTHERWISE PHYSICALLY HURT BY YOUR PARTNER OR EX-PARTNER?: NO

## 2025-02-07 SDOH — SOCIAL STABILITY: SOCIAL NETWORK: HOW OFTEN DO YOU ATTEND CHURCH OR RELIGIOUS SERVICES?: MORE THAN 4 TIMES PER YEAR

## 2025-02-07 SDOH — SOCIAL STABILITY: SOCIAL NETWORK: HOW OFTEN DO YOU GET TOGETHER WITH FRIENDS OR RELATIVES?: THREE TIMES A WEEK

## 2025-02-07 SDOH — ECONOMIC STABILITY: HOUSING INSECURITY: DO YOU FEEL UNSAFE GOING BACK TO THE PLACE WHERE YOU LIVE?: NO

## 2025-02-07 SDOH — SOCIAL STABILITY: SOCIAL INSECURITY: ABUSE: ADULT

## 2025-02-07 SDOH — SOCIAL STABILITY: SOCIAL INSECURITY: ARE YOU OR HAVE YOU BEEN THREATENED OR ABUSED PHYSICALLY, EMOTIONALLY, OR SEXUALLY BY ANYONE?: NO

## 2025-02-07 SDOH — SOCIAL STABILITY: SOCIAL INSECURITY: HAVE YOU HAD THOUGHTS OF HARMING ANYONE ELSE?: NO

## 2025-02-07 SDOH — SOCIAL STABILITY: SOCIAL INSECURITY: DOES ANYONE TRY TO KEEP YOU FROM HAVING/CONTACTING OTHER FRIENDS OR DOING THINGS OUTSIDE YOUR HOME?: NO

## 2025-02-07 SDOH — SOCIAL STABILITY: SOCIAL INSECURITY
WITHIN THE LAST YEAR, HAVE YOU BEEN RAPED OR FORCED TO HAVE ANY KIND OF SEXUAL ACTIVITY BY YOUR PARTNER OR EX-PARTNER?: NO

## 2025-02-07 SDOH — HEALTH STABILITY: PHYSICAL HEALTH: ON AVERAGE, HOW MANY DAYS PER WEEK DO YOU ENGAGE IN MODERATE TO STRENUOUS EXERCISE (LIKE A BRISK WALK)?: 0 DAYS

## 2025-02-07 SDOH — SOCIAL STABILITY: SOCIAL INSECURITY: WITHIN THE LAST YEAR, HAVE YOU BEEN AFRAID OF YOUR PARTNER OR EX-PARTNER?: NO

## 2025-02-07 SDOH — SOCIAL STABILITY: SOCIAL INSECURITY: DO YOU FEEL UNSAFE GOING BACK TO THE PLACE WHERE YOU ARE LIVING?: NO

## 2025-02-07 SDOH — SOCIAL STABILITY: SOCIAL NETWORK
IN A TYPICAL WEEK, HOW MANY TIMES DO YOU TALK ON THE PHONE WITH FAMILY, FRIENDS, OR NEIGHBORS?: MORE THAN THREE TIMES A WEEK

## 2025-02-07 SDOH — SOCIAL STABILITY: SOCIAL INSECURITY: WERE YOU ABLE TO COMPLETE ALL THE BEHAVIORAL HEALTH SCREENINGS?: YES

## 2025-02-07 SDOH — HEALTH STABILITY: MENTAL HEALTH: CURRENT SMOKER: 0

## 2025-02-07 ASSESSMENT — COGNITIVE AND FUNCTIONAL STATUS - GENERAL
WALKING IN HOSPITAL ROOM: TOTAL
MOVING TO AND FROM BED TO CHAIR: A LITTLE
STANDING UP FROM CHAIR USING ARMS: A LITTLE
TOILETING: A LOT
TURNING FROM BACK TO SIDE WHILE IN FLAT BAD: A LITTLE
TURNING FROM BACK TO SIDE WHILE IN FLAT BAD: A LITTLE
HELP NEEDED FOR BATHING: A LOT
DRESSING REGULAR LOWER BODY CLOTHING: A LOT
WALKING IN HOSPITAL ROOM: A LITTLE
MOBILITY SCORE: 24
MOVING TO AND FROM BED TO CHAIR: TOTAL
DRESSING REGULAR LOWER BODY CLOTHING: TOTAL
DAILY ACTIVITIY SCORE: 20
HELP NEEDED FOR BATHING: A LITTLE
CLIMB 3 TO 5 STEPS WITH RAILING: A LOT
DAILY ACTIVITIY SCORE: 17
MOBILITY SCORE: 10
MOVING FROM LYING ON BACK TO SITTING ON SIDE OF FLAT BED WITH BEDRAILS: A LITTLE
MOBILITY SCORE: 18
CLIMB 3 TO 5 STEPS WITH RAILING: TOTAL
STANDING UP FROM CHAIR USING ARMS: TOTAL
TOILETING: A LITTLE
PATIENT BASELINE BEDBOUND: NO
DAILY ACTIVITIY SCORE: 24

## 2025-02-07 ASSESSMENT — PAIN SCALES - GENERAL
PAINLEVEL_OUTOF10: 8
PAINLEVEL_OUTOF10: 0 - NO PAIN
PAINLEVEL_OUTOF10: 9
PAINLEVEL_OUTOF10: 2
PAINLEVEL_OUTOF10: 0 - NO PAIN
PAINLEVEL_OUTOF10: 8

## 2025-02-07 ASSESSMENT — ACTIVITIES OF DAILY LIVING (ADL)
JUDGMENT_ADEQUATE_SAFELY_COMPLETE_DAILY_ACTIVITIES: YES
TOILETING: INDEPENDENT
BATHING: INDEPENDENT
FEEDING YOURSELF: INDEPENDENT
HEARING - RIGHT EAR: FUNCTIONAL
DRESSING YOURSELF: INDEPENDENT
WALKS IN HOME: INDEPENDENT
ADEQUATE_TO_COMPLETE_ADL: YES
HEARING - LEFT EAR: FUNCTIONAL
LACK_OF_TRANSPORTATION: NO
PATIENT'S MEMORY ADEQUATE TO SAFELY COMPLETE DAILY ACTIVITIES?: YES
GROOMING: INDEPENDENT

## 2025-02-07 ASSESSMENT — LIFESTYLE VARIABLES
AUDIT-C TOTAL SCORE: 0
HOW OFTEN DO YOU HAVE A DRINK CONTAINING ALCOHOL: NEVER
AUDIT-C TOTAL SCORE: 0
SKIP TO QUESTIONS 9-10: 1
HOW OFTEN DO YOU HAVE 6 OR MORE DRINKS ON ONE OCCASION: NEVER
HOW MANY STANDARD DRINKS CONTAINING ALCOHOL DO YOU HAVE ON A TYPICAL DAY: PATIENT DOES NOT DRINK

## 2025-02-07 ASSESSMENT — PAIN - FUNCTIONAL ASSESSMENT
PAIN_FUNCTIONAL_ASSESSMENT: 0-10

## 2025-02-07 ASSESSMENT — PAIN DESCRIPTION - DESCRIPTORS
DESCRIPTORS: SORE
DESCRIPTORS: DISCOMFORT

## 2025-02-07 ASSESSMENT — PATIENT HEALTH QUESTIONNAIRE - PHQ9
1. LITTLE INTEREST OR PLEASURE IN DOING THINGS: NOT AT ALL
2. FEELING DOWN, DEPRESSED OR HOPELESS: NOT AT ALL
SUM OF ALL RESPONSES TO PHQ9 QUESTIONS 1 & 2: 0

## 2025-02-07 ASSESSMENT — PAIN DESCRIPTION - ORIENTATION: ORIENTATION: LEFT

## 2025-02-07 ASSESSMENT — PAIN DESCRIPTION - LOCATION: LOCATION: KNEE

## 2025-02-07 NOTE — ANESTHESIA PREPROCEDURE EVALUATION
Patient: Navi Valdez    Procedure Information       Date/Time: 02/07/25 0930    Procedure: LEFT TOTAL KNEE REPLACEMENT (Left: Knee)    Location: [facility] / [facility]    Surgeons: Edison Seth MD            Relevant Problems   Anesthesia (within normal limits)      Cardiac   (+) Atherosclerosis of coronary artery   (+) Atrial fibrillation (Multi)   (+) HTN (hypertension)   (+) Hyperlipidemia, mixed      Endocrine   (+) Class 1 obesity with body mass index (BMI) of 34.0 to 34.9 in adult       Clinical information reviewed:                   NPO Detail:  No data recorded     Physical Exam    Airway  Mallampati: II  TM distance: >3 FB  Neck ROM: full     Cardiovascular - normal exam  Rhythm: regular  Rate: normal     Dental   (+) upper dentures, lower dentures     Pulmonary - normal exam  Breath sounds clear to auscultation     Abdominal            Anesthesia Plan    History of general anesthesia?: yes  History of complications of general anesthesia?: no    ASA 3     spinal and regional     The patient is not a current smoker.    intravenous induction   Anesthetic plan and risks discussed with patient.  Use of blood products discussed with patient who consented to blood products.    Plan discussed with CRNA.

## 2025-02-07 NOTE — PROGRESS NOTES
Occupational Therapy    Evaluation    Patient Name: Navi Valdez  MRN: [id number]  Today's Date: 2/7/2025  Time Calculation  Start Time: 1421  Stop Time: 1441  Time Calculation (min): 20 min  211/211-A    Assessment  IP OT Assessment  OT Assessment: Patient would benefit from further OT for ADL's and functional transfers/mobility while recovering from left TKR done 2/7/2025.    Plan:  Treatment Interventions: ADL retraining, Functional transfer training, Patient/family training, Equipment evaluation/education, Endurance training, Compensatory technique education (TKR precaution training)  OT Frequency: BID (as needed)  OT Discharge Recommendations: Low intensity level of continued care  OT - OK to Discharge: Yes to next level of care when medically cleared by physician/medical team    Subjective   Current Problem:  1. Arthritis of left knee  Surgical Pathology Exam    Surgical Pathology Exam    oxyCODONE (Roxicodone) 5 mg immediate release tablet    traMADol (Ultram) 50 mg tablet    docusate sodium (Colace) 100 mg capsule        General:  General  Reason for Referral: recent surgery  Referred By: Edison Seth MD  Past Medical History Relevant to Rehab: A-fib, HTN, HLD, chronic back pain and Left knee OA  Family/Caregiver Present: Yes (wife)  Co-Treatment: PT Co-eval to maximize safety during mobility tasks  Prior to Session Communication: Bedside nurse who confirmed that patient is medically stable to participate in this OT session  Patient Position Received: Bed, 2 rail up, Alarm off, not on at start of session  General Comment: Patient seen in room; cooperative, motivated    Precautions:  LE Weight Bearing Status: Weight Bearing as Tolerated  Medical Precautions: Fall precautions, Oxygen therapy device and L/min  Post-Surgical Precautions: Left total knee precautions  Precautions Comment: UE IV    Pain:  Pain Assessment  Pain Assessment: 0-10  0-10 (Numeric) Pain Score: 0 - No pain (stated post-op anesthesia  numbness of BLE)    Objective   Cognition:  Overall Cognitive Status: Within Functional Limits     Home Living:  Type of Home: House  Lives With: Spouse  Home Adaptive Equipment: Cane, Walker rolling or standard  Home Layout: One level  Home Access: Stairs to enter with rails (4)  Bathroom Shower/Tub: Tub/shower unit  Bathroom Toilet:  (higher height)  Bathroom Equipment: Grab bars in shower, Hand-held shower hose, Shower chair without back, Grab bars around toilet  Home Living Comments: sleeps in recliner chair     Prior Function:  Level of [place]: Independent with ADLs and functional transfers (wife primarily does homemaking and babysits 5 y.o. grandchild)  Ambulatory Assistance: Independent (use of cane or holds onto furniture)  Hand Dominance: Right  Prior Function Comments: plans to change jobs since previous one was strenous as     ADL:  LE Dressing Assistance: Total (estimate)  LE Dressing Deficit: Don/doff R sock, Don/doff L sock  ADL Comments: to further address lower body ADL's using assistive techniques/adaptive equipment as needed while recovering from TKR    Bed Mobility/Transfers:   Bed Mobility  Bed Mobility: Yes  Bed Mobility 1  Bed Mobility 1: Supine to sitting, Sitting to supine  Level of Assistance 1: Moderate assistance (primarily for LLE support)  Bed Mobility Comments 1: HOB elevated supine to sit  Transfers  Transfer: No (unable to progress further in mobility due to BLE numbness)    Sitting Balance:  Static Sitting Balance  Static Sitting-Level of Assistance: Close supervision    Sensation:    Chronic intermittent numbness/tingling of bilateral thumb, index, middle fingers - most notable when has tight grasp on objects     Extremities: RUE   RUE : Within Functional Limits and LUE   LUE: Within Functional Limits    Outcome Measures: [facility] Daily Activity  Putting on and taking off regular lower body clothing: Total  Bathing (including washing, rinsing, drying): A lot  Putting  on and taking off regular upper body clothing: None  Toileting, which includes using toilet, bedpan or urinal: A lot  Taking care of personal grooming such as brushing teeth: None  Eating Meals: None  Daily Activity - Total Score: 17      EDUCATION:  Education Documentation  Precautions, taught by Patria Barbour OT at 2/7/2025  3:26 PM.  Learner: Patient  Readiness: Acceptance  Method: Explanation  Response: Demonstrated Understanding, Needs Reinforcement    Mobility Training, taught by Patria Barbour OT at 2/7/2025  3:26 PM.  Learner: Patient  Readiness: Acceptance  Method: Explanation  Response: Demonstrated Understanding, Needs Reinforcement    Goals:   Encounter Problems       Encounter Problems (Active)       OT Goals       Patient will complete lower body bathing/dressing; toileting with minimal assist using assistive techniques/adaptive equipment as needed  (Progressing)       Start:  02/07/25    Expected End:  02/08/25            Patient will perform functional transfers with supervision:  bed, chair, commode using DME as needed and simulated car transfer with minimal assist  (Progressing)       Start:  02/07/25    Expected End:  02/08/25            Patient will adhere to TKR precautions to ADL's and functional transfers/mobility  (Progressing)       Start:  02/07/25    Expected End:  02/08/25            Patient will tolerate standing for 3 mins. in prep for ADL's and functional transfers/mobility  (Progressing)       Start:  02/07/25    Expected End:  02/08/25

## 2025-02-07 NOTE — PROGRESS NOTES
Spiritual Care Visit   Notes  Mr. Valdez welcomed a visit. This was a patient-centered care visit. This  allowed him to direct this visit. He talked about what was meaningful to him. He discussed his present health issue. He displayed positive coping skills. His angelo is important to him. He shared about his angelo in God and reflected on his service to the [employer] and community. He explored theological topics. He shared his emotions. Per his request, I offered inspirational reading ([employer] booklet). I created time and space for active listening. I intervened with a gentle, nonjudgmental presence. I offered the opportunity for emotional and spiritual care. He expressed gratitude. My visit was a holistic approach to wellness as whole-person care and I facilitated the integration of the body, mind and spirit. There are no other needs.  Spiritual Care Request    Reason for Visit:  Routine Visit: Introduction  Continue Visiting: No  Surgical Visit: Pre-op   Request Received From:  Referral From:   Focus of Care:  Visited With: Patient and family together (Mr. Valdez's wife Vesna was present at bedside.)   Refer to :  Spiritual Care Assessment    Spiritual Assessment:  Patient Spiritual Care Encounters  Fear Level: None  Feelings of Loneliness: Excellent  Feelings of Hopelessness: Excellent  Coping: Consistently demonstrated  Social Interaction: 100% of the time  Family Spiritual Care Encounters  Family Coping: Accepting  Family Participation in Care: Consistently demonstrated  Family Support During Treatment: Consistently demonstrated  Caregiver-Patient Relationship: Not compromised  Care Provided:  Intended Effects: Demonstrate caring and concern  Methods: Encourage story-telling  Interventions: Ask guided questions, Discuss spirituality/Faith with someone, Provide spiritual/Jainism resources  Sense of Community and or Nondenominational Affiliation:  Tenriism    Addressed  Needs/Concerns and/or [place] Through:  Outcome:  Outcome of Spiritual Care Visit: Spirituality connected, Support system identified, Identifying patient's strengths/source of hope, Comfort/healing presence   Advance Directives:    Patient: Navi Valdez    Date: 2/7/2025  Time: 9:42 AM  Total time (min.): 25    I offered instruction on how to reach out to the Spiritual Care Department for future needs. I remain available upon request.  [facility] Department of Spiritual Care Contact #: [phone]  Signed by: Rev. Ness Trujillo ThM, MA

## 2025-02-07 NOTE — ANESTHESIA POSTPROCEDURE EVALUATION
Patient: Navi Valdez    Procedure Summary       Date: 02/07/25 Room / Location: [facility] / [facility]    Anesthesia Start: 0916 Anesthesia Stop:     Procedure: LEFT TOTAL KNEE REPLACEMENT (Left: Knee) Diagnosis:       Arthritis of left knee      (Arthritis of left knee [M17.12])    Surgeons: Edison Seth MD Responsible Provider: Jj Russo MD    Anesthesia Type: spinal, regional ASA Status: 3            Anesthesia Type: spinal, regional    Vitals Value Taken Time   /57 02/07/25 1202   Temp 36.4 02/07/25 1202   Pulse 64 02/07/25 1201   Resp 16 02/07/25 1202   SpO2 96 % 02/07/25 1201   Vitals shown include unfiled device data.    Anesthesia Post Evaluation    Patient location during evaluation: PACU  Patient participation: complete - patient participated  Level of consciousness: awake and alert  Pain management: adequate  Airway patency: patent  Cardiovascular status: acceptable  Respiratory status: acceptable  Hydration status: acceptable  Postoperative Nausea and Vomiting: none        No notable events documented.

## 2025-02-07 NOTE — ANESTHESIA PROCEDURE NOTES
Peripheral Block    Patient location during procedure: pre-op  Start time: 2/7/2025 8:24 AM  End time: 2/7/2025 8:34 AM  Reason for block: at surgeon's request and post-op pain management  Staffing  Performed: attending   Authorized by: Jj Russo MD    Performed by: Jj Russo MD  Preanesthetic Checklist  Completed: patient identified, IV checked, site marked, risks and benefits discussed, surgical consent, monitors and equipment checked, pre-op evaluation and timeout performed   Timeout performed at: 2/7/2025 8:24 AM  Peripheral Block  Patient position: laying flat  Prep: ChloraPrep  Patient monitoring: heart rate, cardiac monitor and continuous pulse ox  Block type: adductor canal  Laterality: left  Injection technique: single-shot  Guidance: ultrasound guided  Local infiltration: ropivacaine  Infiltration strength: 0.5 %  Dose: 25 mL  Needle  Needle gauge: 21 G  Needle length: 10 cm  Needle localization: ultrasound guidance  Test dose: negative  Assessment  Injection assessment: negative aspiration for heme, no paresthesia on injection, incremental injection and local visualized surrounding nerve on ultrasound  Paresthesia pain: none  Heart rate change: no  Slow fractionated injection: yes  Additional Notes  0,5% Ropivacaine wit Epi wash and 20mg Depomedrol injected in 3 mlincrements without any problems

## 2025-02-07 NOTE — OP NOTE
LEFT TOTAL KNEE REPLACEMENT (L) Operative Note     Date: 2025  OR Location: [facility]    Name: Navi Valdez, : 1955, Age: 69 y.o., MRN: [id number], Sex: male    Diagnosis  Pre-op Diagnosis      * Arthritis of left knee [M17.12] Post-op Diagnosis     * Arthritis of left knee [M17.12]     Procedures  LEFT TOTAL KNEE REPLACEMENT  80888 - VT ARTHRP KNE CONDYLE&PLATU MEDIAL&LAT COMPARTMENTS  DePuy attune femur 9, tibia 8, 7 mm posterior stabilized polyethylene, 41 mm patella    Surgeons      * Edison Seth - Primary    Resident/Fellow/Other Assistant:    Charlene Mendosa    Staff:   Circulator: Pranay  Surgical Assistant: Charlene  Surgical Assistant: Deondre  Scrub Person: Ernst    Anesthesia Staff: Anesthesiologist: Jj Russo MD  CRNA: LUIS MANUEL Garnica-CRNA    Procedure Summary  Anesthesia: Regional, Spinal  ASA: III  Estimated Blood Loss: 25 mL    Indications:  Patient has undergone extensive conservative treatment, but has peristant severe sharp shooting pain and difficulty with standing and walking.  Radiographs demonstrate severe degenerative changes with loss of joint space and osteophyte formation and subchondral sclerosis and cystic change.  Treatment options including no treatment were reviewed and the decision was made to proceed with surgery.    Description of procedure: The patient was brought into the operating room.  Anesthesia was performed.  The patient was positioned and prepped and draped in the usual fashion.  After Esmarch exsanguination the tourniquet was inflated.  A longitudinal midline incision was made.  Medial parapatellar arthrotomy was performed.  Medial periosteal release was done.  There was advanced degenerative disease involving the knee.  Remaining medial and lateral menisci were removed.  ACL was removed.  PCL was removed notch osteophytes were removed.  Entry into the femoral canal was done and the intramedullary guide was placed.  The distal femoral cut was made  at 10 mm.  The femur was sized.  The cutting block was applied and then the cuts were made.  Peripheral osteophytes were removed.  Attention was turned to the tibia and the external alignment guide was used and the tibial cut was made.  Posterior osteophytes were removed.  Trialing was then done and the implants were selected.  The decision was made to resurface the patella and with an oscillating saw the patellar cut was made to remove a similar amount of bone as the patellar implant.  The patellar lug drills were drilled and the patella was trialed and tracked well.  The femoral lug drills were drilled and tibia was prepared with the appropriate punches.  The bone cuts were pulse irrigated and then well dried.  The tibial implant was cemented.  The polyethylene was impacted on and then the femoral implant was cemented followed by the patellar implant.  Extruding cement was removed and the cement was allowed to harden.  The wound was copiously irrigated with antibiotic irrigation.  Hemostasis was carefully obtained.  The knee was stable throughout a full range of motion and the patella tracked well.  The arthrotomy was closed with #1 Vicryl and flexion against gravity was 120 degrees following closure.  The remainder of the wound was closed in layers and a sterile dressing was applied.  The patient tolerated the procedure well and was taken to the recovery room in stable condition.  Estimated blood loss was 25 cc.  The bone was sent for specimen.  There were no complications.  The patient received the appropriate preoperative antibiotic within 1 hour of the skin incision and antibiotics were ordered postoperatively to be completed within 24 hours.  DVT prophylaxis was ordered to start within 24 hours.  The patient received 1 g of tranexamic acid intravenous at the start and at the end of the procedure.         Anesthesia Record               Intraprocedure I/O Totals          Intake    Tranexamic Acid 0.00 mL     The total shown is the total volume documented since Anesthesia Start was filed.    Total Intake 0 mL          Specimen:   ID Type Source Tests Collected by Time   1 : LEFT KNEE BONE AND TISSUE Tissue KNEE ARTHROPLASTY LEFT SURGICAL PATHOLOGY EXAM Edison Seth MD 2/7/2025 1009                 Drains and/or Catheters: * None in log *    Tourniquet Times:     Total Tourniquet Time Documented:  Thigh (Left) - 75 minutes  Total: Thigh (Left) - 75 minutes      Implants:  Implants       Type Name Action Serial No.      Joint Knee CEMENT, BONE, BIOMET R, 1X40 - [id number] Implanted      Joint Knee CEMENT, BONE, BIOMET R, 1X40 - [id number] Implanted      Joint Knee FEMORAL, ATTUNE PS, CLARK, SZ 9, LT - [id number] Implanted      Joint Knee DOME, PATELLA, MEDIALIZED, 41MM - [id number] Implanted       SIZE 8 ATTUNE FIXED BEARING TIBIAL BASE, CEMENTED Implanted       SIZE 9, 7MM, ATTUNE POSTERIOR STABILIZED FIXED BEARING INSERT Implanted                 Task Performed by RNFA or Surgical Assistant:  The assistant was required to provide retraction for adequate and safe exposure, to assist with securing cutting guides as they were held in place by operating surgeon, to assist in patient leg positioning throughout the procedure, assist with trial implant placement and trial reduction and range of motion testing, assist with final implant placement and assist with wound closure.  She was involved in the performance of the entire procedure.  No qualified resident was available.        Attending Attestation: I performed the procedure.    Edison Seth  Phone Number: [phone]

## 2025-02-07 NOTE — ANESTHESIA PROCEDURE NOTES
Peripheral Block    Patient location during procedure: pre-op  Start time: 2/7/2025 8:24 AM  End time: 2/7/2025 8:34 AM  Reason for block: at surgeon's request and post-op pain management  Staffing  Performed: attending   Authorized by: Jj Russo MD    Performed by: Jj Russo MD  Preanesthetic Checklist  Completed: patient identified, IV checked, site marked, risks and benefits discussed, surgical consent, monitors and equipment checked, pre-op evaluation and timeout performed   Timeout performed at: 2/7/2025 8:24 AM  Peripheral Block  Patient position: laying flat  Prep: ChloraPrep  Patient monitoring: heart rate, cardiac monitor and continuous pulse ox  Block type: IPACK  Laterality: left  Injection technique: single-shot  Guidance: ultrasound guided  Local infiltration: ropivacaine  Infiltration strength: 0.5 %  Dose: 15 mL  Needle  Needle gauge: 21 G  Needle length: 10 cm  Needle localization: ultrasound guidance  Test dose: negative  Assessment  Injection assessment: negative aspiration for heme, no paresthesia on injection, incremental injection and local visualized surrounding nerve on ultrasound  Paresthesia pain: none  Heart rate change: no  Slow fractionated injection: yes  Additional Notes  0,5% Ropivacaine wit Epi wash and 20mg Depomedrol injected in 3 mlincrements without any problems.

## 2025-02-07 NOTE — PROGRESS NOTES
Physical Therapy    Physical Therapy Evaluation    Patient Name: Navi Valdez  MRN: [id number]  Today's Date: 2/7/2025   Time Calculation  Start Time: 1422  Stop Time: 1440  Time Calculation (min): 18 min  211/211-A    Assessment/Plan   PT Assessment  PT Assessment Results: Decreased strength, Impaired balance  Rehab Prognosis: Good  Evaluation/Treatment Tolerance: Patient tolerated treatment well  Medical Staff Made Aware: Yes  Strengths: Ability to acquire knowledge, Attitude of self  End of Session Communication: Bedside nurse  Assessment Comment: pt tolerated session well. pt required assist during activity to maintain safety. pt would benefit from low int therapy to improve balance and LE strength.  End of Session Patient Position: Bed, 2 rail up (call light in reach)  IP OR SWING BED PT PLAN  Inpatient or Swing Bed: Inpatient  PT Plan  Treatment/Interventions: Bed mobility, Transfer training, Gait training  PT Plan: Ongoing PT  PT Frequency: Daily  PT Discharge Recommendations: Low intensity level of continued care (24/7 supervision)  PT Recommended Transfer Status: Assist x1  PT - OK to Discharge: Yes    Subjective     Current Problem:  1. Arthritis of left knee  Surgical Pathology Exam    Surgical Pathology Exam    oxyCODONE (Roxicodone) 5 mg immediate release tablet    traMADol (Ultram) 50 mg tablet    docusate sodium (Colace) 100 mg capsule        Patient Active Problem List   Diagnosis    HTN (hypertension)    Atrial fibrillation (Multi)    Arthritis of both knees    Arthritis of left hip    Atherosclerosis of coronary artery    Constipation    Hyperlipidemia, mixed    Presence of hip joint prosthesis    Class 1 obesity with body mass index (BMI) of 34.0 to 34.9 in adult    Hematuria    Arthritis of left knee    Arthritis of knee, left       General Visit Information:  General  Reason for Referral: PT eval and treat  Referred By: Edison Seth MD  Past Medical History Relevant to Rehab: A-fib, HTN, HLD,  chronic back pain and Left knee OA  Family/Caregiver Present: Yes (wife)  Co-Treatment: OT  Co-Treatment Reason: to maximize pt safety and mobility  Prior to Session Communication: Bedside nurse  Patient Position Received: Bed, 2 rail up, Alarm off, not on at start of session  General Comment: pt agreeable to therapy    Home Living:  Home Living  Type of Home: House  Lives With: Spouse  Home Adaptive Equipment: Cane, Walker rolling or standard  Home Layout: One level  Home Access: Stairs to enter with rails (4)  Bathroom Shower/Tub: Tub/shower unit  Bathroom Toilet:  (higher height)  Bathroom Equipment: Grab bars in shower, Hand-held shower hose, Shower chair without back, Grab bars around toilet  Home Living Comments: sleeps in recliner chair    Prior Level of Function:  Prior Function Per Pt/Caregiver Report  Level of [place]: Independent with ADLs and functional transfers (wife primarily does homemaking and babysits 5 y.o. grandchild)  Ambulatory Assistance: Independent (use of cane or holds onto furniture)  Hand Dominance: Right  Prior Function Comments: plans to change jobs since previous one was strenous as     Precautions:  Precautions  LE Weight Bearing Status: Weight Bearing as Tolerated  Medical Precautions: Fall precautions, Oxygen therapy device and L/min  Post-Surgical Precautions: Left total knee precautions  Precautions Comment: UE IV         Objective     Pain:  Pain Assessment  Pain Assessment: 0-10  0-10 (Numeric) Pain Score: 0 - No pain (post-anesthesia numbness of BLE)    Cognition:  Cognition  Overall Cognitive Status: Within Functional Limits    General Assessments:         Sensation  Light Touch: No apparent deficits  Strength  Strength Comments: BLE strength and ROM WFL                   Functional Assessments:     Bed Mobility  Bed Mobility: Yes  Bed Mobility 1  Bed Mobility 1: Supine to sitting, Sitting to supine  Level of Assistance 1: Moderate assistance  Bed Mobility  Comments 1: HOB elevated supine to sit  Transfers  Transfer: No (unable to progress further in mobility due to BLE numbness)             Outcome Measures:     [facility] Basic Mobility  Turning from your back to your side while in a flat bed without using bedrails: A little  Moving from lying on your back to sitting on the side of a flat bed without using bedrails: A little  Moving to and from bed to chair (including a wheelchair): Total  Standing up from a chair using your arms (e.g. wheelchair or bedside chair): Total  To walk in hospital room: Total  Climbing 3-5 steps with railing: Total  Basic Mobility - Total Score: 10                      Goals:  Encounter Problems       Encounter Problems (Active)       PT Problem       STG - Pt will perform a B LE ther ex program of 2-3 sets of 10  (Progressing)       Start:  02/07/25    Expected End:  02/21/25            STG - Pt will transfer STS with SBA (Progressing)       Start:  02/07/25    Expected End:  02/21/25            STG - Pt will amb 100' using w/w with SBA  (Progressing)       Start:  02/07/25    Expected End:  02/21/25            STG - Pt will transition supine <> sitting with SBA (Progressing)       Start:  02/07/25    Expected End:  02/21/25               PT Problem       STG -  Pt will navigate 4 stairs using rail with SBA  (Progressing)       Start:  02/07/25    Expected End:  02/21/25                 Education Documentation  Precautions, taught by Manuela Walls PT at 2/7/2025  3:50 PM.  Learner: Patient  Readiness: Acceptance  Method: Explanation  Response: Verbalizes Understanding    Mobility Training, taught by Manuela Walls PT at 2/7/2025  3:50 PM.  Learner: Patient  Readiness: Acceptance  Method: Explanation  Response: Verbalizes Understanding    Education Comments  No comments found.

## 2025-02-07 NOTE — CARE PLAN
The patient's goals for the shift include      The clinical goals for the shift include Pt. will remain comfortable and free of injury throughout the shift    Problem: Pain - Adult  Goal: Verbalizes/displays adequate comfort level or baseline comfort level  Outcome: Progressing     Problem: Safety - Adult  Goal: Free from fall injury  Outcome: Progressing     Problem: Discharge Planning  Goal: Discharge to home or other facility with appropriate resources  Outcome: Progressing     Problem: Nutrition  Goal: Nutrient intake appropriate for maintaining nutritional needs  Outcome: Progressing     Problem: Fall/Injury  Goal: Not fall by end of shift  Outcome: Progressing  Goal: Be free from injury by end of the shift  Outcome: Progressing  Goal: Verbalize understanding of personal risk factors for fall in the hospital  Outcome: Progressing  Goal: Verbalize understanding of risk factor reduction measures to prevent injury from fall in the home  Outcome: Progressing  Goal: Use assistive devices by end of the shift  Outcome: Progressing  Goal: Pace activities to prevent fatigue by end of the shift  Outcome: Progressing     Problem: Pain  Goal: Takes deep breaths with improved pain control throughout the shift  Outcome: Progressing  Goal: Turns in bed with improved pain control throughout the shift  Outcome: Progressing  Goal: Walks with improved pain control throughout the shift  Outcome: Progressing  Goal: Performs ADL's with improved pain control throughout shift  Outcome: Progressing  Goal: Participates in PT with improved pain control throughout the shift  Outcome: Progressing  Goal: Free from opioid side effects throughout the shift  Outcome: Progressing  Goal: Free from acute confusion related to pain meds throughout the shift  Outcome: Progressing     Problem: Skin  Goal: Decreased wound size/increased tissue granulation at next dressing change  Outcome: Progressing  Goal: Participates in plan/prevention/treatment  measures  Outcome: Progressing  Goal: Prevent/manage excess moisture  Outcome: Progressing  Goal: Prevent/minimize sheer/friction injuries  Outcome: Progressing  Goal: Promote/optimize nutrition  Outcome: Progressing  Goal: Promote skin healing  Outcome: Progressing

## 2025-02-07 NOTE — ANESTHESIA PROCEDURE NOTES
Spinal Block    Patient location during procedure: OR  Start time: 2/7/2025 9:25 AM  End time: 2/7/2025 9:35 AM  Reason for block: primary anesthetic  Staffing  Performed: attending   Authorized by: Jj Russo MD    Performed by: LYNNE Garnica    Preanesthetic Checklist  Completed: patient identified, IV checked, risks and benefits discussed, surgical consent, monitors and equipment checked, pre-op evaluation, timeout performed and sterile techniques followed  Block Timeout  RN/Licensed healthcare professional reads aloud to the Anesthesia provider and entire team: Patient identity, procedure with side and site, patient position, and as applicable the availability of implants/special equipment/special requirements.  Patient on coagulant treatment: no  Timeout performed at:   Spinal Block  Patient position: sitting  Prep: Betadine  Sterility prep: cap, drape, gloves, hand hygiene and mask  Sedation level: light sedation  Patient monitoring: blood pressure, continuous pulse oximetry, EKG, ETCO2 and heart rate  Approach: midline  Vertebral space: L3-4  Injection technique: single-shot  Needle  Needle type: Allison   Needle gauge: 22 G  Free flowing CSF: yes    Assessment bilateral  Block outcome: patient comfortable  Procedure assessment: patient sedated but conversant throughout procedure  Additional Notes  SP&D to lumbar; 1% lido skin wheal placed; introducer in, #22g allison to sas for + csf; bupivacaine 0.75% for 2.0 ml injected after + barbotage; pt teetee well & to supine; IV sedation started

## 2025-02-07 NOTE — CONSULTS
"                                                Hospital Medicine Consult Note       Patient Name: Navi Valdez   YOB: 1955    Requesting Provider:   Edison Seth  Phone Number: [phone]    Reason for Consult:   Post-op medical management    Subjective:    Navi Valdez is a 69 y.o. male who presented to the hospital for elective LEFT TOTAL KNEE REPLACEMENT (Left). Hospitalist service was consulted for medical management in the post operative period. Feels okay. His nerve block is still wearing off.     A 10 point ROS was completed and is negative unless otherwise noted above.     Past Medical History:   Diagnosis Date    A-fib (Multi)     Anxiety     Arthritis     Hyperlipidemia     Hypertension     Personal history of other medical treatment     History of cardiac monitoring    Personal history of other medical treatment     History of echocardiogram    Vision loss        Past Surgical History:   Procedure Laterality Date    BACK SURGERY      COLONOSCOPY      FOOT SURGERY      HERNIA REPAIR      HIP ARTHROPLASTY      JOINT REPLACEMENT      MENISCECTOMY      TONSILLECTOMY      VASECTOMY         Family History   Problem Relation Name Age of Onset    Lung cancer Mother      Heart disease Father          Social History     Tobacco Use    Smoking status: Never     Passive exposure: Never    Smokeless tobacco: Never   Vaping Use    Vaping status: Never Used   Substance Use Topics    Alcohol use: Never    Drug use: Never        Objective:    /81   Pulse 62   Temp 36.7 °C (98.1 °F) (Temporal)   Resp 16   Ht 1.854 m (6' 0.99\")   Wt 118 kg (259 lb 14.8 oz)   SpO2 95%   BMI 34.30 kg/m²     Physical Exam:    GENERAL: Alert and cooperative. NAD.  CARDIOVASCULAR: Regular rate and rhythm. S1/S2.  RESPIRATORY: Clear to auscultation b/l. Normal effort.   ABDOMEN: Soft, non-tender. Not distended. No rebound or guarding.  MUSCULOSKELETAL: knee is wrapped  NEUROLOGICAL: A&O X 3. CN II-XII are grossly " intact. No focal deficits.   PSYCH: Appropriate mood and affect.     Assessment/Plan:    L TKR  pAfib  HTN  HLD      Post op management such pain pain control, diet & activity level as per primary surgical service  Home medication reconciliation was reviewed. Hold hydrochlorothiazide for now, resume on dc. Eliquis is at half dose per ortho, resume at full dose when able.     DVT Prophylaxis: per surgical team  Code Status: Full Code    Hospitalist service will follow. Please reach out via EpicChat with any questions or concerns.       Pj Licona, [facility]  02/07/25

## 2025-02-08 ENCOUNTER — DOCUMENTATION (OUTPATIENT)
Dept: HOME HEALTH SERVICES | Facility: HOME HEALTH | Age: 70
End: 2025-02-08
Payer: MEDICARE

## 2025-02-08 ENCOUNTER — HOME HEALTH ADMISSION (OUTPATIENT)
Dept: HOME HEALTH SERVICES | Facility: HOME HEALTH | Age: 70
End: 2025-02-08
Payer: MEDICARE

## 2025-02-08 VITALS
HEART RATE: 74 BPM | DIASTOLIC BLOOD PRESSURE: 80 MMHG | RESPIRATION RATE: 19 BRPM | WEIGHT: 259.92 LBS | HEIGHT: 73 IN | TEMPERATURE: 98.2 F | BODY MASS INDEX: 34.45 KG/M2 | SYSTOLIC BLOOD PRESSURE: 141 MMHG | OXYGEN SATURATION: 95 %

## 2025-02-08 LAB
ANION GAP SERPL CALC-SCNC: 13 MMOL/L (ref 10–20)
BUN SERPL-MCNC: 23 MG/DL (ref 6–23)
CALCIUM SERPL-MCNC: 8.2 MG/DL (ref 8.6–10.3)
CHLORIDE SERPL-SCNC: 99 MMOL/L (ref 98–107)
CO2 SERPL-SCNC: 25 MMOL/L (ref 21–32)
CREAT SERPL-MCNC: 0.68 MG/DL (ref 0.5–1.3)
EGFRCR SERPLBLD CKD-EPI 2021: >90 ML/MIN/1.73M*2
ERYTHROCYTE [DISTWIDTH] IN BLOOD BY AUTOMATED COUNT: 12.2 % (ref 11.5–14.5)
GLUCOSE SERPL-MCNC: 140 MG/DL (ref 74–99)
HCT VFR BLD AUTO: 37.1 % (ref 41–52)
HGB BLD-MCNC: 12.5 G/DL (ref 13.5–17.5)
MCH RBC QN AUTO: 30.3 PG (ref 26–34)
MCHC RBC AUTO-ENTMCNC: 33.7 G/DL (ref 32–36)
MCV RBC AUTO: 90 FL (ref 80–100)
NRBC BLD-RTO: 0 /100 WBCS (ref 0–0)
PLATELET # BLD AUTO: 297 X10*3/UL (ref 150–450)
POTASSIUM SERPL-SCNC: 3.8 MMOL/L (ref 3.5–5.3)
RBC # BLD AUTO: 4.12 X10*6/UL (ref 4.5–5.9)
SODIUM SERPL-SCNC: 133 MMOL/L (ref 136–145)
WBC # BLD AUTO: 13.3 X10*3/UL (ref 4.4–11.3)

## 2025-02-08 PROCEDURE — 36415 COLL VENOUS BLD VENIPUNCTURE: CPT | Performed by: ORTHOPAEDIC SURGERY

## 2025-02-08 PROCEDURE — 2500000002 HC RX 250 W HCPCS SELF ADMINISTERED DRUGS (ALT 637 FOR MEDICARE OP, ALT 636 FOR OP/ED): Performed by: ORTHOPAEDIC SURGERY

## 2025-02-08 PROCEDURE — 97535 SELF CARE MNGMENT TRAINING: CPT | Mod: GO

## 2025-02-08 PROCEDURE — 7100000011 HC EXTENDED STAY RECOVERY HOURLY - NURSING UNIT

## 2025-02-08 PROCEDURE — 2500000001 HC RX 250 WO HCPCS SELF ADMINISTERED DRUGS (ALT 637 FOR MEDICARE OP): Performed by: ORTHOPAEDIC SURGERY

## 2025-02-08 PROCEDURE — 97530 THERAPEUTIC ACTIVITIES: CPT | Mod: GO

## 2025-02-08 PROCEDURE — 2500000004 HC RX 250 GENERAL PHARMACY W/ HCPCS (ALT 636 FOR OP/ED): Performed by: ORTHOPAEDIC SURGERY

## 2025-02-08 PROCEDURE — 80048 BASIC METABOLIC PNL TOTAL CA: CPT | Performed by: ORTHOPAEDIC SURGERY

## 2025-02-08 PROCEDURE — 99238 HOSP IP/OBS DSCHRG MGMT 30/<: CPT | Performed by: CLINICAL NURSE SPECIALIST

## 2025-02-08 PROCEDURE — 85027 COMPLETE CBC AUTOMATED: CPT | Performed by: ORTHOPAEDIC SURGERY

## 2025-02-08 PROCEDURE — 97116 GAIT TRAINING THERAPY: CPT | Mod: GP

## 2025-02-08 PROCEDURE — 99231 SBSQ HOSP IP/OBS SF/LOW 25: CPT | Performed by: INTERNAL MEDICINE

## 2025-02-08 PROCEDURE — 97110 THERAPEUTIC EXERCISES: CPT | Mod: GP

## 2025-02-08 RX ADMIN — ACETAMINOPHEN 650 MG: 325 TABLET, FILM COATED ORAL at 06:53

## 2025-02-08 RX ADMIN — PROMETHAZINE HYDROCHLORIDE 25 MG: 25 TABLET ORAL at 06:53

## 2025-02-08 RX ADMIN — CEFAZOLIN SODIUM 2 G: 2 INJECTION, SOLUTION INTRAVENOUS at 03:12

## 2025-02-08 RX ADMIN — ATORVASTATIN CALCIUM 20 MG: 20 TABLET, FILM COATED ORAL at 09:13

## 2025-02-08 RX ADMIN — ACETAMINOPHEN 650 MG: 325 TABLET, FILM COATED ORAL at 12:17

## 2025-02-08 RX ADMIN — POLYETHYLENE GLYCOL 3350 17 G: 17 POWDER, FOR SOLUTION ORAL at 09:12

## 2025-02-08 RX ADMIN — OXYCODONE HYDROCHLORIDE 10 MG: 5 TABLET ORAL at 07:44

## 2025-02-08 RX ADMIN — ONDANSETRON 4 MG: 2 INJECTION INTRAMUSCULAR; INTRAVENOUS at 03:59

## 2025-02-08 RX ADMIN — TRAMADOL HYDROCHLORIDE 50 MG: 50 TABLET, COATED ORAL at 09:49

## 2025-02-08 RX ADMIN — DOCUSATE SODIUM 100 MG: 100 CAPSULE, LIQUID FILLED ORAL at 09:13

## 2025-02-08 RX ADMIN — APIXABAN 2.5 MG: 2.5 TABLET, FILM COATED ORAL at 09:12

## 2025-02-08 ASSESSMENT — ACTIVITIES OF DAILY LIVING (ADL): HOME_MANAGEMENT_TIME_ENTRY: 30

## 2025-02-08 ASSESSMENT — COGNITIVE AND FUNCTIONAL STATUS - GENERAL
STANDING UP FROM CHAIR USING ARMS: A LITTLE
DAILY ACTIVITIY SCORE: 21
WALKING IN HOSPITAL ROOM: A LITTLE
DAILY ACTIVITIY SCORE: 20
TOILETING: A LITTLE
DRESSING REGULAR LOWER BODY CLOTHING: A LITTLE
CLIMB 3 TO 5 STEPS WITH RAILING: A LITTLE
DRESSING REGULAR LOWER BODY CLOTHING: A LOT
WALKING IN HOSPITAL ROOM: A LITTLE
TURNING FROM BACK TO SIDE WHILE IN FLAT BAD: A LITTLE
HELP NEEDED FOR BATHING: A LITTLE
TOILETING: A LITTLE
TURNING FROM BACK TO SIDE WHILE IN FLAT BAD: A LITTLE
MOBILITY SCORE: 21
MOVING TO AND FROM BED TO CHAIR: A LITTLE
MOBILITY SCORE: 18
HELP NEEDED FOR BATHING: A LITTLE
CLIMB 3 TO 5 STEPS WITH RAILING: A LOT

## 2025-02-08 ASSESSMENT — PAIN SCALES - GENERAL
PAINLEVEL_OUTOF10: 7
PAINLEVEL_OUTOF10: 6
PAINLEVEL_OUTOF10: 4
PAINLEVEL_OUTOF10: 7
PAINLEVEL_OUTOF10: 5 - MODERATE PAIN
PAINLEVEL_OUTOF10: 5 - MODERATE PAIN

## 2025-02-08 ASSESSMENT — PAIN - FUNCTIONAL ASSESSMENT
PAIN_FUNCTIONAL_ASSESSMENT: 0-10

## 2025-02-08 ASSESSMENT — PAIN DESCRIPTION - DESCRIPTORS
DESCRIPTORS: ACHING

## 2025-02-08 ASSESSMENT — PAIN SCALES - PAIN ASSESSMENT IN ADVANCED DEMENTIA (PAINAD): TOTALSCORE: MEDICATION (SEE MAR)

## 2025-02-08 NOTE — CARE PLAN
Problem: Pain - Adult  Goal: Verbalizes/displays adequate comfort level or baseline comfort level  Outcome: Progressing     Problem: Safety - Adult  Goal: Free from fall injury  Outcome: Progressing     Problem: Discharge Planning  Goal: Discharge to home or other facility with appropriate resources  Outcome: Progressing     Problem: Nutrition  Goal: Nutrient intake appropriate for maintaining nutritional needs  Outcome: Progressing     Problem: Fall/Injury  Goal: Not fall by end of shift  Outcome: Progressing  Goal: Be free from injury by end of the shift  Outcome: Progressing  Goal: Verbalize understanding of personal risk factors for fall in the hospital  Outcome: Progressing  Goal: Verbalize understanding of risk factor reduction measures to prevent injury from fall in the home  Outcome: Progressing  Goal: Use assistive devices by end of the shift  Outcome: Progressing  Goal: Pace activities to prevent fatigue by end of the shift  Outcome: Progressing     Problem: Pain  Goal: Takes deep breaths with improved pain control throughout the shift  Outcome: Progressing  Goal: Turns in bed with improved pain control throughout the shift  Outcome: Progressing  Goal: Walks with improved pain control throughout the shift  Outcome: Progressing  Goal: Performs ADL's with improved pain control throughout shift  Outcome: Progressing  Goal: Participates in PT with improved pain control throughout the shift  Outcome: Progressing  Goal: Free from opioid side effects throughout the shift  Outcome: Progressing  Goal: Free from acute confusion related to pain meds throughout the shift  Outcome: Progressing     Problem: Skin  Goal: Decreased wound size/increased tissue granulation at next dressing change  Outcome: Progressing  Goal: Participates in plan/prevention/treatment measures  Outcome: Progressing  Goal: Prevent/manage excess moisture  Outcome: Progressing  Goal: Prevent/minimize sheer/friction injuries  Outcome:  Progressing  Goal: Promote/optimize nutrition  Outcome: Progressing  Goal: Promote skin healing  Outcome: Progressing

## 2025-02-08 NOTE — DISCHARGE INSTRUCTIONS
PAIN MANAGEMENT AT HOME:  To provide the best pain control over the next few days when pain will be at it's worst, we suggest you continue to take the following medications routinely and separately to provide the best pain management.  In addition, apply ice VERY FREQUENTLY to incision.   Also use some type of alternative intervention such as music therapy, relaxation techniques, or an type of diversion (such as watching television or talking to a friend) to help control pain.         ROUTINE MEDICATIONS:            TAKE TRAMADOL EVERY 6 HOURS           TAKE TYLENOL EVERY 3 TIMES A DAY    In between, take oxycodone as needed for breakthrough pain.  DO NOT TAKE OXYCODONE AND TRAMADOL AT SAME TIME!!!!  Alternate between the tramadol and oxycodone.    Also, do NOT take more than 4000mg of acetaminophen (tylenol) in 24 hours.        CONSTIPATION MANAGEMENT AT HOME:  Constipation is common after Joint Replacement surgery for several reasons.  A common side effect of all pain medication is constipation.  A decrease in your activity as well as change in your normal diet & appetite all contribute to the constipation.  At home, it is important to take a daily stool softener such as Colace, Milk of magnesium or senokot while you are taking pain medications to help manage the constipation.  In addition, if you do NOT have a bowel movement within 72 hours of discharge, add a laxative such as miralax.  Miralax normally takes 2 to 3 days to work so you will not receive immediate results.  It is also important to drink plenty of fluids, especially water.  Stool softeners & laxatives need water to work properly.  We also suggest you increase your fiber intake either with foods high in fiber or with some type of supplement such as benefiber or metamucil.    Foods that are high in fiber include:     Fruits such as pears, strawberries, avocado, apples, raspberries, bananas, kiwis   Vegetables such as carrots, beets, broccoli, brussel  sprouts, spinach   Lentils and kidney beans   Split peas and chickpeas   Oats, almonds, saud seeds, and sweet potatoes      ACTIVITY AT HOME:  You will need to continue with your therapy after discharge either with in home therapy or at an outpatient facility.  Most patients initially do in home therapy for about two weeks then transition to outpatient therapy.  You have freedom of choice in which in home therapy and outpatient facility you plan to use.  Most in home therapy sessions will begin within 24 to 48 hours after discharge.  After about two weeks of in home therapy, you will most likely to ready for outpatient therapy sessions.  Outpatient therapy is normally twice a week for weeks.  While you are at home it is important that you perform the exercises the therapist went over with you in the hospital 2 to 3 times a day.  After you complete your exercises, apply ice to your incision to help with swelling and pain.  You should also be getting up and walking around your house every hour with the use of your walker.  While at rest, perform, ankle pumps on each foot at least ten times.  This will help with the swelling as well as decrease your risk for blood clots.  ALWAYS USE YOUR WALKER WITH ANY TYPE OF ACTIVITY!!!!    WOUND CARE:  The bandage on your incision will stay in place for 7 days.  The bandage is waterproof so you may shower with the bandage in place.  No need to wrap or cover it.  Some drainage on your bandage is perfectly normal.  Home health care will assist you with bandage removal.  Once the bandage is removed, your incision can be left open to air if there is no drainage present.  If your incision is draining at the time of bandage removal, home health care will assist you with applying a new gauze bandage.  Gauze bandages are NOT waterproof.    Swelling in your operative extremity will peak about 72 hours after surgery and can last for weeks.  To help with the swelling make sure you are  elevating your leg and apply ice frequently.  In addition, you will receive compression stockings upon discharge from the hospital.  Make sure you are wearing these stockings on both your legs at home.  Generally we instruct you to wear during the day and remove at bedtime for the next 4 weeks.      *Apply ice to incision at least 5 times daily    *Wear bilateral zac hose stockings to lower extremities for next 4 weeks    *Do NOT take any non steroidal anti-inflammatory medications such as motrin, aleve, ibuprofen, celebrex or meloxicam    *Take daily stool softener.  If you experience any diarrhea, stop medication    *If you need a refill on your pain medication, contact your surgeon's office Monday through Thursday with as least 24 hours notice    If you have any questions or concerns please contact the Joint  Dianna Vick at:  Office: [phone]   Email:  [email or web address]

## 2025-02-08 NOTE — PROGRESS NOTES
Physical Therapy    Physical Therapy Treatment    Patient Name: Navi Valdez  MRN: [id number]  Today's Date: 2/8/2025  Time Calculation  Start Time: 0737  Stop Time: 0812  Time Calculation (min): 35 min     211/211-A    Assessment/Plan   PT Assessment  PT Assessment Results: Decreased strength, Impaired balance  Rehab Prognosis: Good  Evaluation/Treatment Tolerance: Patient tolerated treatment well  Medical Staff Made Aware: Yes  Strengths: Ability to acquire knowledge, Attitude of self  End of Session Communication: Bedside nurse  Assessment Comment: Pt demos improved functional independence and progressed towards acute PT goals. Pt is safe to return home with [facility] PT/OT and has no further acute PT needs.  End of Session Patient Position: Bed, 2 rail up, Alarm off, not on at start of session     PT Plan  Treatment/Interventions: Bed mobility, Transfer training, Gait training  PT Plan: Ongoing PT  PT Frequency: Daily  PT Discharge Recommendations: Low intensity level of continued care (24/7 supervision)  PT Recommended Transfer Status: Assist x1  PT - OK to Discharge: Yes    Current Problem:  Patient Active Problem List   Diagnosis    HTN (hypertension)    Atrial fibrillation (Multi)    Arthritis of both knees    Arthritis of left hip    Atherosclerosis of coronary artery    Constipation    Hyperlipidemia, mixed    Presence of hip joint prosthesis    Class 1 obesity with body mass index (BMI) of 34.0 to 34.9 in adult    Hematuria    Arthritis of left knee    Arthritis of knee, left       General Visit Information:   PT  Visit  PT Received On: 02/08/25  Response to Previous Treatment: Patient with no complaints from previous session.  General  Prior to Session Communication: Bedside nurse  Patient Position Received: Up in chair  General Comment: Pt pleasant and agreeable to therapy. Recieved pain meds prior to session.  Subjective     Precautions:  Precautions  LE Weight Bearing Status: Weight Bearing as  Tolerated  Medical Precautions: Fall precautions  Post-Surgical Precautions: Left total knee precautions  Precautions Comment: UE IV     Objective     Pain:  Pain Assessment  Pain Assessment: 0-10  0-10 (Numeric) Pain Score: 7  Pain Type: Surgical pain  Pain Location: Knee  Pain Interventions: Repositioned, Ambulation/increased activity, Cold applied (RN medicated)    Cognition:  Cognition  Overall Cognitive Status: Within Functional Limits  Orientation Level: Oriented X4    Extremity/Trunk Assessments:        RLE   RLE : Within Functional Limits  LLE   LLE : Exceptions to WFL (flexion to ~60 degrees and extension lacking ~5 degrees)    Activity Tolerance:  Activity Tolerance  Endurance: Tolerates 30 min exercise with multiple rests    Treatments:  Therapeutic Exercise  Therapeutic Exercise Performed: Yes  Therapeutic Exercise Activity 1: Ankle pumps 1x10 R and L in supine  Therapeutic Exercise Activity 2: LAQ seated EOB x10 LLE with AAROM  Therapeutic Exercise Activity 3: Heel slide in supine with AAROM to ~50 degrees flexion  Therapeutic Exercise Activity 4: quad set 1x10 with 5 second hold  Therapeutic Exercise Activity 5: SLR with AAROM x10 on LLE     Bed Mobility  Bed Mobility:  (completed sit to supine with Bing for LLE management. Denies dizziness. States his wife can help assist his LLE.)  Ambulation/Gait Training  Ambulation/Gait Training Performed:  (completed ~75'x2 with a few standing rest breaks and SBA with use of FWW. Denies dizziness. Step to pattern with decreased L knee flexion and heel strike.)  Transfers  Transfer:  (completed multiple stands from chair to FWW with MOD I. Demos correct hand placement and LE positioning.)  Stairs  Stairs:  (completed 4 steps with R HR and SBA for safety. Demos non-reciprocal pattern and no acute buckling.)     Outcome Measures:     [facility] Basic Mobility  Turning from your back to your side while in a flat bed without using bedrails: None  Moving from lying on your  back to sitting on the side of a flat bed without using bedrails: A little  Moving to and from bed to chair (including a wheelchair): None  Standing up from a chair using your arms (e.g. wheelchair or bedside chair): None  To walk in hospital room: A little  Climbing 3-5 steps with railing: A little  Basic Mobility - Total Score: 21        Education Documentation  Precautions, taught by Corinne Still PT at 2/8/2025  8:34 AM.  Learner: Patient  Readiness: Acceptance  Method: Explanation  Response: Verbalizes Understanding    Mobility Training, taught by Corinne Still PT at 2/8/2025  8:34 AM.  Learner: Patient  Readiness: Acceptance  Method: Explanation  Response: Verbalizes Understanding    Education Comments  No comments found.           EDUCATION:     Encounter Problems       Encounter Problems (Resolved)       PT Problem       STG - Pt will perform a B LE ther ex program of 2-3 sets of 10  (Met)       Start:  02/07/25    Expected End:  02/21/25    Resolved:  02/08/25         STG - Pt will transfer STS with SBA (Met)       Start:  02/07/25    Expected End:  02/21/25    Resolved:  02/08/25         STG - Pt will amb 100' using w/w with SBA  (Met)       Start:  02/07/25    Expected End:  02/21/25    Resolved:  02/08/25         STG - Pt will transition supine <> sitting with SBA (Met)       Start:  02/07/25    Expected End:  02/21/25    Resolved:  02/08/25            PT Problem       STG -  Pt will navigate 4 stairs using rail with SBA  (Met)       Start:  02/07/25    Expected End:  02/21/25    Resolved:  02/08/25

## 2025-02-08 NOTE — CARE PLAN
The patient's goals for the shift include      The clinical goals for the shift include   Problem: Safety - Adult  Goal: Free from fall injury  Outcome: Progressing     Problem: Discharge Planning  Goal: Discharge to home or other facility with appropriate resources  Outcome: Progressing     Problem: Nutrition  Goal: Nutrient intake appropriate for maintaining nutritional needs  Outcome: Progressing     Problem: Fall/Injury  Goal: Not fall by end of shift  Outcome: Progressing  Goal: Be free from injury by end of the shift  Outcome: Progressing  Goal: Verbalize understanding of personal risk factors for fall in the hospital  Outcome: Progressing  Goal: Verbalize understanding of risk factor reduction measures to prevent injury from fall in the home  Outcome: Progressing  Goal: Use assistive devices by end of the shift  Outcome: Progressing  Goal: Pace activities to prevent fatigue by end of the shift  Outcome: Progressing     Problem: Pain  Goal: Takes deep breaths with improved pain control throughout the shift  Outcome: Progressing  Goal: Turns in bed with improved pain control throughout the shift  Outcome: Progressing  Goal: Walks with improved pain control throughout the shift  Outcome: Progressing  Goal: Performs ADL's with improved pain control throughout shift  Outcome: Progressing  Goal: Participates in PT with improved pain control throughout the shift  Outcome: Progressing  Goal: Free from opioid side effects throughout the shift  Outcome: Progressing  Goal: Free from acute confusion related to pain meds throughout the shift  Outcome: Progressing     Problem: Skin  Goal: Decreased wound size/increased tissue granulation at next dressing change  Outcome: Progressing  Goal: Participates in plan/prevention/treatment measures  Outcome: Progressing  Goal: Prevent/manage excess moisture  Outcome: Progressing  Goal: Prevent/minimize sheer/friction injuries  Outcome: Progressing  Goal: Promote/optimize  nutrition  Outcome: Progressing  Goal: Promote skin healing  Outcome: Progressing

## 2025-02-08 NOTE — HH CARE COORDINATION
Home Care received a Referral for Nursing and Physical Therapy. We have processed the referral for a Start of Care on 2/9.     If you have any questions or concerns, please feel free to contact us at [phone]. Follow the prompts, enter your five digit zip code, and you will be directed to your care team on WE[facility] 3.

## 2025-02-08 NOTE — PROGRESS NOTES
"                                                Hospital Medicine Consult Note       Patient Name: Navi Valdez   YOB: 1955    Requesting Provider:   Edison Seth  Phone Number: [phone]    Reason for Consult:   Post-op medical management    Subjective:    Navi Valdez is a 69 y.o. male who presented to the hospital for elective LEFT TOTAL KNEE REPLACEMENT (Left). Hospitalist service was consulted for medical management in the post operative period.     Labs and vitals stable. No complaints.       Past Medical History:   Diagnosis Date    A-fib (Multi)     Anxiety     Arthritis     Hyperlipidemia     Hypertension     Personal history of other medical treatment     History of cardiac monitoring    Personal history of other medical treatment     History of echocardiogram    Vision loss        Past Surgical History:   Procedure Laterality Date    BACK SURGERY      COLONOSCOPY      FOOT SURGERY      HERNIA REPAIR      HIP ARTHROPLASTY      JOINT REPLACEMENT      MENISCECTOMY      TONSILLECTOMY      VASECTOMY         Family History   Problem Relation Name Age of Onset    Lung cancer Mother      Heart disease Father          Social History     Tobacco Use    Smoking status: Never     Passive exposure: Never    Smokeless tobacco: Never   Vaping Use    Vaping status: Never Used   Substance Use Topics    Alcohol use: Never    Drug use: Never        Objective:    /76   Pulse 75   Temp 36.9 °C (98.4 °F) (Temporal)   Resp 16   Ht 1.854 m (6' 0.99\")   Wt 118 kg (259 lb 14.8 oz)   SpO2 92%   BMI 34.30 kg/m²     Physical Exam:    GENERAL: Alert and cooperative. NAD.  CARDIOVASCULAR: Regular rate and rhythm. S1/S2.  RESPIRATORY: Clear to auscultation b/l. Normal effort.   ABDOMEN: Soft, non-tender. Not distended. No rebound or guarding.  MUSCULOSKELETAL: knee is wrapped  NEUROLOGICAL: A&O X 3. CN II-XII are grossly intact. No focal deficits.   PSYCH: Appropriate mood and affect. "     Assessment/Plan:    L TKR  pAfib  HTN  HLD      Post op management such pain pain control, diet & activity level as per primary surgical service  Home medication reconciliation was reviewed. Hold hydrochlorothiazide for now, resume on dc. Eliquis is at half dose per ortho, resume at full dose when able.     DVT Prophylaxis: per surgical team  Code Status: Full Code    Hospitalist service will follow. Please reach out via EpicChat with any questions or concerns.       Pj Licona, DO  Hospital Medicine  02/08/25

## 2025-02-08 NOTE — PROGRESS NOTES
Occupational Therapy    OT Treatment    Patient Name: Navi Valdez  MRN: [id number]  Department: [facility]  Room: [address] [place] [address]  Today's Date: 2/8/2025  Time Calculation  Start Time: 0830  Stop Time: 0915  Time Calculation (min): 45 min        Assessment:  OT Assessment:  (Patient with good overall progress with bed mobility  and transfers requiring cga / min a as well as LE dressing with socks and slacks - has good support at home - suggested a sock aide to assist with dressing)  Prognosis: Good  Barriers to Discharge Home: No anticipated barriers  Evaluation/Treatment Tolerance: Patient tolerated treatment well  Medical Staff Made Aware: Yes  End of Session Communication: Bedside nurse  End of Session Patient Position: Bed, 2 rail up  OT Assessment Results: Decreased ADL status, Decreased functional mobility, Decreased IADLs  Prognosis: Good  Barriers to Discharge: None  Evaluation/Treatment Tolerance: Patient tolerated treatment well  Medical Staff Made Aware: Yes  Strengths: Ability to acquire knowledge, Housing layout, Living arrangement secure  Plan:  Treatment Interventions: ADL retraining  OT Frequency: BID (as needed)  OT Discharge Recommendations: Low intensity level of continued care  OT - OK to Discharge: Yes  Treatment Interventions: ADL retraining    Subjective   Previous Visit Info:     General:     Precautions:  LE Weight Bearing Status: Weight Bearing as Tolerated  Medical Precautions: Fall precautions  Post-Surgical Precautions: Left total knee precautions            Pain:  Pain Assessment  Pain Assessment: 0-10  0-10 (Numeric) Pain Score: 5 - Moderate pain  Pain Type: Surgical pain  Pain Location: Knee  Pain Orientation: Left  Pain Interventions: Repositioned, Ambulation/increased activity  Response to Interventions: Content/relaxed    Objective    Cognition:  Cognition  Orientation Level: Oriented X4  Coordination:     Activities of Daily Living: LE Dressing  LE Dressing:  (use of sock aide to [place] socks /  able to thread slacks and stand at the edge of the bed to austin/ hayden yanezma's - assitance requred to don Marino hose)- therapist placed Marino Hose on patient   Functional Standing Tolerance:     Bed Mobility/Transfers: Bed Mobility  Bed Mobility: Yes  Bed Mobility 1  Bed Mobility 1: Side lying left to sit  Level of Assistance 1: Contact guard  Bed Mobility Comments 1:  (Performed x 3 attempts with progressive improvement noted)    Transfer 1  Transfer From 1: Bed to  Transfer to 1: Stand  Technique 1: Sit to stand  Transfer Device 1: Walker  Transfer Level of Assistance 1: Close supervision  Transfers 2  Transfer From 2:  (simulated car transfers)  Technique 2: Sit to stand  Transfer Device 2: Walker  Transfer Level of Assistance 2: Contact guard  Trials/Comments 2:  (Able to similate getting legs in/out of a car from tthe edge of the bed - overall CGA)                 Outcome Measures:[facility] Daily Activity  Putting on and taking off regular lower body clothing: A little  Bathing (including washing, rinsing, drying): A little  Putting on and taking off regular upper body clothing: None  Toileting, which includes using toilet, bedpan or urinal: A little  Taking care of personal grooming such as brushing teeth: None  Eating Meals: None  Daily Activity - Total Score: 21        Education Documentation  Precautions, taught by Kimani Garcia OT at 2/8/2025 12:18 PM.  Learner: Patient  Readiness: Acceptance  Method: Explanation  Response: Verbalizes Understanding    Education Comments  No comments found.        OP EDUCATION:  Education  Individual(s) Educated: Patient  Education Provided: Diagnosis & Precautions  Risk and Benefits Discussed with Patient/Caregiver/Other: yes    Goals:  Encounter Problems       Encounter Problems (Active)       OT Goals       Patient will complete lower body bathing/dressing; toileting with minimal assist using assistive techniques/adaptive equipment as needed  (Progressing)       Start:   02/07/25    Expected End:  02/08/25            Patient will perform functional transfers with supervision:  bed, chair, commode using DME as needed and simulated car transfer with minimal assist  (Progressing)       Start:  02/07/25    Expected End:  02/08/25            Patient will adhere to TKR precautions to ADL's and functional transfers/mobility  (Progressing)       Start:  02/07/25    Expected End:  02/08/25            Patient will tolerate standing for 3 mins. in prep for ADL's and functional transfers/mobility  (Progressing)       Start:  02/07/25    Expected End:  02/08/25

## 2025-02-09 ENCOUNTER — HOME CARE VISIT (OUTPATIENT)
Dept: HOME HEALTH SERVICES | Facility: HOME HEALTH | Age: 70
End: 2025-02-09
Payer: MEDICARE

## 2025-02-09 VITALS
RESPIRATION RATE: 18 BRPM | TEMPERATURE: 97.5 F | OXYGEN SATURATION: 95 % | DIASTOLIC BLOOD PRESSURE: 70 MMHG | SYSTOLIC BLOOD PRESSURE: 110 MMHG | HEART RATE: 83 BPM

## 2025-02-09 PROCEDURE — 169592 NO-PAY CLAIM PROCEDURE

## 2025-02-09 PROCEDURE — G0151 HHCP-SERV OF PT,EA 15 MIN: HCPCS | Mod: HHH

## 2025-02-09 PROCEDURE — G0299 HHS/HOSPICE OF RN EA 15 MIN: HCPCS | Mod: HHH

## 2025-02-09 SDOH — HEALTH STABILITY: PHYSICAL HEALTH: EXERCISE TYPE: SUPINE ECX X 20 REPS

## 2025-02-09 ASSESSMENT — ENCOUNTER SYMPTOMS
OCCASIONAL FEELINGS OF UNSTEADINESS: 0
OCCASIONAL FEELINGS OF UNSTEADINESS: 0
MUSCLE WEAKNESS: 1
HIGHEST PAIN SEVERITY IN PAST 24 HOURS: 9/10
LOWEST PAIN SEVERITY IN PAST 24 HOURS: 5/10
PAIN: 1
PERSON REPORTING PAIN: PATIENT
LIMITED RANGE OF MOTION: 1
APPETITE LEVEL: GOOD

## 2025-02-09 ASSESSMENT — ACTIVITIES OF DAILY LIVING (ADL)
AMBULATION ASSISTANCE ON FLAT SURFACES: 1
OASIS_M1830: 03
ENTERING_EXITING_HOME: MINIMUM ASSIST

## 2025-02-09 NOTE — HOME HEALTH
patient seen for pt pauline today following l tkr on 2.7.  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  he lives with his wife in a home where everything he needs is on the first floor and there are 5 steps to exit .

## 2025-02-10 NOTE — DISCHARGE SUMMARY
Discharge Diagnosis  Arthritis of knee, left  Obesity with BMI 34.30    Issues Requiring Follow-Up  None     Test Results Pending At Discharge  Pending Labs       Order Current Status    Surgical Pathology Exam In process            Hospital Course   Patient is a 69 year old male with severe osteoarthritis of left knee.  Prior medical history includes atrial fibrillation and hypertension.  On 2/7/25, patient underwent uncomplicated left total knee replacement by Dr Seth.  One dose of IV antibiotics was given preoperatively and 2 additional doses were administered postoperatively.  Hospitalist service was placed on consult to help assist with post op medical management.  For DVT prophylaxis, patient was ordered eliquis 2.5mg twicve a day and bilateral SCDs.  Physical and occupational therapy were initiated with full weight bearing status.    Patient progressed well with therapy.  His hemoglobin level was stable at 12.5 and he remained afebrile.  On POD #1, patient was medically stable and safe for discharge home with [facility] follow up.  At time of discharge, patient was ambulation 75 feet with use of wheeled walker and stand by assistance. He was also able to demonstrate safe stair climbing with stand by assist and his left knee range of motion was 5 to 60 degrees.  Patient was instructed to resume his usual home dose of eliquis at home.  In addition, bilateral zac hose compression stockings were given along with instructions to wear at home for 4 weeks.  For pain management, oxycodone and tramadol scripts were given.  Anticipate need for extended use of narcotic medications as well as higher MED requirements based on usual post operative incisional pain following total joint replacement surgery.  Patient will follow up in office with Dr Seth in 4 weeks.      Pertinent Physical Exam At Time of Discharge  Physical Exam    Home Medications     Medication List      START taking these medications     acetaminophen 325 mg  tablet; Commonly known as: Tylenol; Take 3 tablets   (975 mg) by mouth 3 times a day.; Replaces: acetaminophen 650 mg ER tablet   docusate sodium 100 mg capsule; Commonly known as: Colace; Take 1   capsule (100 mg) by mouth 2 times a day for 14 days.   oxyCODONE 5 mg immediate release tablet; Commonly known as: Roxicodone;   Take 1-2 tablets (5-10 mg) by mouth every 6 hours if needed for severe   pain (7 - 10) for up to 7 days.   polyethylene glycol 17 gram packet; Commonly known as: Glycolax,   Miralax; Take 17 g by mouth once daily as needed (for constipation).   traMADol 50 mg tablet; Commonly known as: Ultram; Take 1 tablet (50 mg)   by mouth every 6 hours if needed for moderate pain (4 - 6) for up to 7   days.     CONTINUE taking these medications     apixaban 5 mg tablet; Commonly known as: Eliquis; Take 1 tablet (5 mg)   by mouth 2 times a day.   atorvastatin 20 mg tablet; Commonly known as: Lipitor; Take 1 tablet (20   mg) by mouth once daily.   hydroCHLOROthiazide 25 mg tablet; Commonly known as: HYDRODiuril; Take 1   tablet (25 mg) by mouth once daily.   lisinopril 40 mg tablet; Take 1 tablet (40 mg) by mouth once daily.   metoprolol succinate  mg 24 hr tablet; Commonly known as:   Toprol-XL; Take 1 tablet (100 mg) by mouth once daily.     STOP taking these medications     acetaminophen 650 mg ER tablet; Commonly known as: Tylenol 8 HOUR;   Replaced by: acetaminophen 325 mg tablet   chlorhexidine 4 % external liquid; Commonly known as: Hibiclens   glucosamine-chondroitin 500-400 mg tablet   krill-omega-3-dha-epa-lipids 816-76-04-50 mg capsule   turmeric 400 mg capsule       Outpatient Follow-Up  Future Appointments   Date Time Provider Department Center   2/11/2025 To Be Determined Melody Barrera [facility]   2/13/2025 To Be Determined Melody Barrera [facility]   2/17/2025 To Be Determined Melody Barrera [facility]   2/19/2025 To Be Determined Melody Barrera [facility]   2/20/2025 11:15  AM Harish Tracy, PT [id number] [place]   2/21/2025 To Be Determined Melody ROBERT Barrera, PTA [facility]   2/24/2025 To Be Determined Qouc Martinez, PT [facility]   2/24/2025 11:15 AM Harish Tracy, PT [id number] [place]   2/27/2025 10:30 AM Clarissa Chowdhury, PTA [id number] [place]   3/3/2025 11:15 AM Harish Tracy, PT [id number] [place]   3/6/2025 10:30 AM Clarissa Chowdhury, PTA [id number] [place]   3/10/2025 11:15 AM Harish Tracy, PT [id number] [place]   3/11/2025  8:00 AM Edison Seth MD [id number] [place]   3/13/2025 10:30 AM Clarissa Chowdhury, PTA [id number] [place]   4/28/2025 11:30 AM Medhat Robles MD PhD [id number] [place]   5/5/2025  7:30 AM Frank Newell, APRN-CNP PAROPCPNM [place]       Dianna Vick, APRN-CNS

## 2025-02-11 ENCOUNTER — HOME CARE VISIT (OUTPATIENT)
Dept: HOME HEALTH SERVICES | Facility: HOME HEALTH | Age: 70
End: 2025-02-11
Payer: MEDICARE

## 2025-02-11 PROCEDURE — G0157 HHC PT ASSISTANT EA 15: HCPCS | Mod: CQ,HHH

## 2025-02-11 ASSESSMENT — ENCOUNTER SYMPTOMS
HIGHEST PAIN SEVERITY IN PAST 24 HOURS: 7/10
PERSON REPORTING PAIN: PATIENT
SUBJECTIVE PAIN PROGRESSION: WAXING AND WANING
LOWEST PAIN SEVERITY IN PAST 24 HOURS: 6/10
PAIN SEVERITY GOAL: 0/10
PAIN LOCATION: LEFT KNEE
PAIN: 1

## 2025-02-13 ENCOUNTER — HOME CARE VISIT (OUTPATIENT)
Dept: HOME HEALTH SERVICES | Facility: HOME HEALTH | Age: 70
End: 2025-02-13
Payer: MEDICARE

## 2025-02-13 PROCEDURE — G0157 HHC PT ASSISTANT EA 15: HCPCS | Mod: CQ,HHH

## 2025-02-13 ASSESSMENT — ENCOUNTER SYMPTOMS
PAIN: 1
HIGHEST PAIN SEVERITY IN PAST 24 HOURS: 8/10
PAIN LOCATION: LEFT KNEE
LOWEST PAIN SEVERITY IN PAST 24 HOURS: 6/10
SUBJECTIVE PAIN PROGRESSION: WAXING AND WANING
PERSON REPORTING PAIN: PATIENT
PAIN SEVERITY GOAL: 0/10

## 2025-02-14 ENCOUNTER — TELEPHONE (OUTPATIENT)
Dept: ORTHOPEDIC SURGERY | Facility: CLINIC | Age: 70
End: 2025-02-14
Payer: MEDICARE

## 2025-02-14 ENCOUNTER — HOME CARE VISIT (OUTPATIENT)
Dept: HOME HEALTH SERVICES | Facility: HOME HEALTH | Age: 70
End: 2025-02-14
Payer: MEDICARE

## 2025-02-14 VITALS
TEMPERATURE: 98.3 F | RESPIRATION RATE: 20 BRPM | HEART RATE: 80 BPM | SYSTOLIC BLOOD PRESSURE: 122 MMHG | DIASTOLIC BLOOD PRESSURE: 82 MMHG | OXYGEN SATURATION: 99 %

## 2025-02-14 DIAGNOSIS — M17.12 ARTHRITIS OF LEFT KNEE: ICD-10-CM

## 2025-02-14 LAB
LABORATORY COMMENT REPORT: NORMAL
PATH REPORT.FINAL DX SPEC: NORMAL
PATH REPORT.GROSS SPEC: NORMAL
PATH REPORT.RELEVANT HX SPEC: NORMAL
PATH REPORT.TOTAL CANCER: NORMAL

## 2025-02-14 PROCEDURE — G0299 HHS/HOSPICE OF RN EA 15 MIN: HCPCS | Mod: HHH

## 2025-02-14 RX ORDER — OXYCODONE HYDROCHLORIDE 5 MG/1
5 TABLET ORAL EVERY 6 HOURS PRN
Qty: 28 TABLET | Refills: 0 | Status: SHIPPED | OUTPATIENT
Start: 2025-02-14 | End: 2025-02-21 | Stop reason: SDUPTHER

## 2025-02-14 RX ORDER — TRAMADOL HYDROCHLORIDE 50 MG/1
50 TABLET ORAL EVERY 6 HOURS PRN
Qty: 28 TABLET | Refills: 0 | Status: SHIPPED | OUTPATIENT
Start: 2025-02-14 | End: 2025-02-21 | Stop reason: SDUPTHER

## 2025-02-14 ASSESSMENT — ENCOUNTER SYMPTOMS
LOWER EXTREMITY EDEMA: 1
SHORTNESS OF BREATH: 1
LOWEST PAIN SEVERITY IN PAST 24 HOURS: 6/10
DYSPNEA ACTIVITY LEVEL: AT REST
HIGHEST PAIN SEVERITY IN PAST 24 HOURS: 8/10
APPETITE LEVEL: GOOD
PERSON REPORTING PAIN: PATIENT
PAIN: 1

## 2025-02-14 NOTE — Clinical Note
bandage removed today. does have rash to left thigh. photos sent to you via secure chat. photo also in chart.

## 2025-02-14 NOTE — TELEPHONE ENCOUNTER
Pt is requesting a refill on        oxycodone 5mg-2 tabs left  Tramodol 50mg-4 tabs left      Oarrs checked and scanned      [id number] - [place], [place] - [address] AT [facility]  [address] [place] [address]  Phone: [phone] Fax: [phone]

## 2025-02-14 NOTE — HOME HEALTH
pt seen for bandage removal on left knee replacement. bandage removed, pt tolerated well. does have skin irritation surrounding the incision and back of leg. photos in chart. has been itching, so areas may look worse. md informed. also has a blood blister. informed pt to keep incision dry for one week, then he can shower. pain is a 6 during visit, does get up to an 8. increased pain due to PT yesterday. no recent falls, ambulates with walker. last bm was yesterday. uses oxy for pain, also has miralax and stool softeners to keep bms normal. appetite is normal. vitals wnl. no other concerns. no changes in meds reported

## 2025-02-17 ENCOUNTER — HOME CARE VISIT (OUTPATIENT)
Dept: HOME HEALTH SERVICES | Facility: HOME HEALTH | Age: 70
End: 2025-02-17
Payer: MEDICARE

## 2025-02-17 PROCEDURE — G0157 HHC PT ASSISTANT EA 15: HCPCS | Mod: CQ,HHH

## 2025-02-17 ASSESSMENT — ENCOUNTER SYMPTOMS
PERSON REPORTING PAIN: PATIENT
PAIN SEVERITY GOAL: 0/10
SUBJECTIVE PAIN PROGRESSION: WAXING AND WANING
PAIN: 1
PAIN LOCATION: LEFT KNEE
HIGHEST PAIN SEVERITY IN PAST 24 HOURS: 8/10
LOWEST PAIN SEVERITY IN PAST 24 HOURS: 6/10

## 2025-02-19 ENCOUNTER — HOME CARE VISIT (OUTPATIENT)
Dept: HOME HEALTH SERVICES | Facility: HOME HEALTH | Age: 70
End: 2025-02-19
Payer: MEDICARE

## 2025-02-19 PROCEDURE — G0157 HHC PT ASSISTANT EA 15: HCPCS | Mod: CQ,HHH

## 2025-02-19 ASSESSMENT — ENCOUNTER SYMPTOMS
PAIN: 1
PAIN LOCATION: LEFT KNEE
SUBJECTIVE PAIN PROGRESSION: WAXING AND WANING
PAIN SEVERITY GOAL: 0/10
PERSON REPORTING PAIN: PATIENT
LOWEST PAIN SEVERITY IN PAST 24 HOURS: 6/10
HIGHEST PAIN SEVERITY IN PAST 24 HOURS: 8/10

## 2025-02-20 ENCOUNTER — APPOINTMENT (OUTPATIENT)
Dept: PHYSICAL THERAPY | Facility: CLINIC | Age: 70
End: 2025-02-20
Payer: MEDICARE

## 2025-02-21 ENCOUNTER — TELEPHONE (OUTPATIENT)
Dept: ORTHOPEDIC SURGERY | Facility: CLINIC | Age: 70
End: 2025-02-21
Payer: MEDICARE

## 2025-02-21 ENCOUNTER — HOME CARE VISIT (OUTPATIENT)
Dept: HOME HEALTH SERVICES | Facility: HOME HEALTH | Age: 70
End: 2025-02-21
Payer: MEDICARE

## 2025-02-21 VITALS
OXYGEN SATURATION: 99 % | SYSTOLIC BLOOD PRESSURE: 130 MMHG | TEMPERATURE: 97.3 F | RESPIRATION RATE: 18 BRPM | HEART RATE: 65 BPM | DIASTOLIC BLOOD PRESSURE: 84 MMHG

## 2025-02-21 PROCEDURE — G0157 HHC PT ASSISTANT EA 15: HCPCS | Mod: CQ,HHH

## 2025-02-21 PROCEDURE — G0299 HHS/HOSPICE OF RN EA 15 MIN: HCPCS | Mod: HHH

## 2025-02-21 RX ORDER — TRAMADOL HYDROCHLORIDE 50 MG/1
50 TABLET ORAL EVERY 6 HOURS PRN
Qty: 28 TABLET | Refills: 0 | Status: SHIPPED | OUTPATIENT
Start: 2025-02-21 | End: 2025-02-28

## 2025-02-21 RX ORDER — OXYCODONE HYDROCHLORIDE 5 MG/1
5 TABLET ORAL EVERY 6 HOURS PRN
Qty: 28 TABLET | Refills: 0 | Status: SHIPPED | OUTPATIENT
Start: 2025-02-21 | End: 2025-02-28

## 2025-02-21 ASSESSMENT — ENCOUNTER SYMPTOMS
APPETITE LEVEL: GOOD
HIGHEST PAIN SEVERITY IN PAST 24 HOURS: 7/10
PERSON REPORTING PAIN: PATIENT
LOWER EXTREMITY EDEMA: 1
PERSON REPORTING PAIN: PATIENT
LAST BOWEL MOVEMENT: 67257
PAIN: 1
SUBJECTIVE PAIN PROGRESSION: WAXING AND WANING
PAIN LOCATION: LEFT KNEE
LOWEST PAIN SEVERITY IN PAST 24 HOURS: 7/10
PAIN: 1
PAIN SEVERITY GOAL: 0/10
HIGHEST PAIN SEVERITY IN PAST 24 HOURS: 9/10

## 2025-02-21 NOTE — Clinical Note
pt still has rash present. spread to back and right arm. wife and pt states it is better today since he showered today. photos in chart. pain is about a 7. c/o shooting pains in the left knee. pt also is requesting for more oxy and tramadol.

## 2025-02-21 NOTE — HOME HEALTH
pt seen for nursing dc. pts rash spread to back and rt arm, itchiness and apperance has improved since showering this AM. photos of rash and incision in chart. incision has steri strips intact, instructed pt to let these fall off on their own. he agrees. pt also verbalizes that he will cut remaining sutures with scissors once theyre exposed. no changes in meds. still treats pain with oxy/tramadol. requested refill from surgeon. pain is a 7, shooting pains through the night. denies sob, lungs are clear. appetite is good. vitals wnl. no recent fall, ambulates with walker. has outpatient pt set up. no other concerns, goals met, dced from nursing.

## 2025-02-24 ENCOUNTER — APPOINTMENT (OUTPATIENT)
Dept: PHYSICAL THERAPY | Facility: CLINIC | Age: 70
End: 2025-02-24
Payer: MEDICARE

## 2025-02-26 ENCOUNTER — HOME CARE VISIT (OUTPATIENT)
Dept: HOME HEALTH SERVICES | Facility: HOME HEALTH | Age: 70
End: 2025-02-26
Payer: MEDICARE

## 2025-02-26 PROCEDURE — G0157 HHC PT ASSISTANT EA 15: HCPCS | Mod: CQ,HHH

## 2025-02-26 ASSESSMENT — ENCOUNTER SYMPTOMS
HIGHEST PAIN SEVERITY IN PAST 24 HOURS: 6/10
SUBJECTIVE PAIN PROGRESSION: WAXING AND WANING
PERSON REPORTING PAIN: PATIENT
LOWEST PAIN SEVERITY IN PAST 24 HOURS: 5/10
PAIN: 1
PAIN LOCATION: LEFT KNEE
PAIN SEVERITY GOAL: 0/10

## 2025-02-27 ENCOUNTER — HOME CARE VISIT (OUTPATIENT)
Dept: HOME HEALTH SERVICES | Facility: HOME HEALTH | Age: 70
End: 2025-02-27
Payer: MEDICARE

## 2025-02-27 ENCOUNTER — APPOINTMENT (OUTPATIENT)
Dept: PHYSICAL THERAPY | Facility: CLINIC | Age: 70
End: 2025-02-27
Payer: MEDICARE

## 2025-02-27 PROCEDURE — G0151 HHCP-SERV OF PT,EA 15 MIN: HCPCS | Mod: HHH

## 2025-02-27 SDOH — HEALTH STABILITY: PHYSICAL HEALTH: EXERCISE TYPE: TKR PROTOCOL

## 2025-02-27 ASSESSMENT — ENCOUNTER SYMPTOMS
PAIN: 1
LOWEST PAIN SEVERITY IN PAST 24 HOURS: 5/10
PAIN LOCATION: LEFT KNEE
OCCASIONAL FEELINGS OF UNSTEADINESS: 0
HIGHEST PAIN SEVERITY IN PAST 24 HOURS: 7/10
PERSON REPORTING PAIN: PATIENT

## 2025-02-27 ASSESSMENT — ACTIVITIES OF DAILY LIVING (ADL)
OASIS_M1830: 01
HOME_HEALTH_OASIS: 00
AMBULATION ASSISTANCE ON FLAT SURFACES: 1

## 2025-02-27 NOTE — HOME HEALTH
patient seen for agency dc today with wife present.  he states he is happy with his [facility] services and the care that has been provided.  he denies any questions re his hep or activity and agrees with dc and starts out pt rehab monday

## 2025-02-28 ENCOUNTER — TELEPHONE (OUTPATIENT)
Dept: ORTHOPEDIC SURGERY | Facility: CLINIC | Age: 70
End: 2025-02-28
Payer: MEDICARE

## 2025-02-28 DIAGNOSIS — M17.12 ARTHRITIS OF LEFT KNEE: ICD-10-CM

## 2025-02-28 RX ORDER — OXYCODONE HYDROCHLORIDE 5 MG/1
5 TABLET ORAL EVERY 6 HOURS PRN
Qty: 28 TABLET | Refills: 0 | Status: SHIPPED | OUTPATIENT
Start: 2025-02-28 | End: 2025-03-07 | Stop reason: SDUPTHER

## 2025-02-28 RX ORDER — TRAMADOL HYDROCHLORIDE 50 MG/1
50 TABLET ORAL EVERY 6 HOURS PRN
Qty: 28 TABLET | Refills: 0 | Status: SHIPPED | OUTPATIENT
Start: 2025-02-28 | End: 2025-03-07 | Stop reason: SDUPTHER

## 2025-02-28 NOTE — TELEPHONE ENCOUNTER
S/P LT TKA DOS: 2/7     OXYCODONE 5MG ( 4 TABLETS LEFT)    TRAMADOL 50 MG (5 TABLETS)    PHARMACY ON FILE.  LAST RX ON 2/21/25    OARRS IN SYSTEM 2/28/24

## 2025-03-03 ENCOUNTER — APPOINTMENT (OUTPATIENT)
Dept: PHYSICAL THERAPY | Facility: CLINIC | Age: 70
End: 2025-03-03
Payer: MEDICARE

## 2025-03-03 ENCOUNTER — EVALUATION (OUTPATIENT)
Dept: PHYSICAL THERAPY | Facility: CLINIC | Age: 70
End: 2025-03-03
Payer: MEDICARE

## 2025-03-03 DIAGNOSIS — M17.12 PRIMARY OSTEOARTHRITIS OF LEFT KNEE: ICD-10-CM

## 2025-03-03 PROCEDURE — 97110 THERAPEUTIC EXERCISES: CPT | Mod: GP | Performed by: PHYSICAL THERAPIST

## 2025-03-03 PROCEDURE — 97161 PT EVAL LOW COMPLEX 20 MIN: CPT | Mod: GP | Performed by: PHYSICAL THERAPIST

## 2025-03-03 NOTE — PROGRESS NOTES
Physical Therapy Evaluation    Patient Name: Navi Valdez  MRN: [id number]  Today's Date: 3/3/2025  Time Calculation  Start Time: 1115  Stop Time: 1155  Time Calculation (min): 40 min     Problem List Items Addressed This Visit             ICD-10-CM    Primary osteoarthritis of left knee M17.12    Relevant Orders    Follow Up In Physical Therapy       Insurance:  Visit number #1  Insurance Type: Payor: MEDICARE / Plan: MEDICARE PART A AND B / Product Type: *No Product type* /   Authorization or Plan of Care date Range: exp 6/1/25    General:  Reason for visit: s/p L TKA   Referred by: MD Nelida Dick MD appt: 3/11/25   Surgery: Left Total Knee Replacement - Left  Date of Last Surgery: 2/7/2025   Post-Op Days: 24    Precautions:  none  Fall Risk: Low     Assessment:   Navi Valdez  is a 69 y.o. old patient who participated in a physical therapy evaluation today due to L knee pain. Navi presents with signs and symptoms consistent with s/p TKA. Navi's impairments include: gait deficits, pain, decreased strength, decreased range of motion, and decreased functional mobility.  Due to these impairments, he has the following functional limitations and participation restrictions: difficulty with ambulation, difficulty with stair negotiation, difficulty performing household activities, difficulty performing recreational activities, difficulty performing occupational activities, difficulty with sleeping, difficulty with completing/performing some ADLs/IADLs, and increased time to complete ADLs/IADLs. Navi's clinical presentation is Stable and/or uncomplicated characteristics with the level of complexity being low. Navi's potential for rehab is good. Skilled physical therapy services are appropriate and beneficial in order to achieve measurable and meaningful change in the objective tests and measures. Utilization of skilled physical therapy services will aid in advancing his functional status and attaining his  therapy-related goals. Navi verbalized understanding and is in agreement with all goals and plan of care.  Navi left session with all questions answered and no increase in symptoms.      Plan:  1-2x per week for 4-6 weeks    Recommended Treatment:  Therapeutic exercise, Manual therapy, Gait training, Home program instruction and progression, Neuromuscular re-education, Therapeutic activities, Self care and home management, Instruction in activity modification, Electrical stimulation, Vasopneumatic device + cold, and Cryotherapy    Goals:  Patient to be Indep in [facility] for self management of symptoms    LEFS: 60/80 to indicate a significant improvement in overall function.      Patient will demo 5/5 knee strength (all planes) to allow for correct gait and stair mechanics     Patient will demo mild to no limitation AROM left knee to allow for correct mechanics with functional mobility.    Patient to demonstrate gait with least restrictive device for all ADLS and does not demonstrate significant deviations that may contribute to discomfort in the future    Patient to negotiate stairs reciprocally and descend with near equal eccentric control without use of hand rail.     Improve Timed Single leg Stance by 8 seconds (MDC = 8 seconds)     TUG without device = 15 seconds or less indicating reduced fall risk    5 x sit to stand < 15 seconds indicating reduced fall risk      Subjective:  CC: Patient arrives with complaint of L knee pain s/p TKA  DOI: no injury  DOS: 2/7/25  MARY: chronic OA  IMAGING: xrays see chart  PAIN: CURRENT: (5/10); BEST: (5/10); WORST: (8/10); LOCATION: (more medial knee)  ALLEVIATES PAIN: medication as well as ice  EXACERBATES PAIN: walking, standing, exercise, sleep  CURRENT MEDICAL MANAGEMENT: taking pain medicine as scheduled but weaning   PLOF: OA for years  FUNCTIONAL LIMITATIONS: Sleep is interrupted., Stair negotiation, and walking/standing  LIVING ENVIRONMENT: [place] with a few steps to  "enter  WORK:    EXERCISE: minimal at this time  PATIENT'S GOAL: get back to prior mobility    Medical History Form: Reviewed (scanned into chart)    Objective:   GAIT: Independent. Ambulates with a walker Moderately antalgic  STAIRS:   non reciprocal    INCISION: healing well    SENSATION: Intact      PALPATION:   Unremarkable     RANGE OF MOTION:  L knee is 5-80 deg    STRENGTH  Manual Muscle Test Left Knee: .  Knee Flexion L: 4  Knee Extension L: 4       Outcome Measures:  Other Measures  Lower Extremity Funtional Score (LEFS): 24     Treatment Performed: (\"NP\" = Not Performed)     Therapeutic Exercise:  15 minutes  Access Code: [email or web address]  URL: [email or web address]  Date: 03/03/2025  Prepared by: Harish Tracy    Exercises  - Supine Heel Slide with Strap  - 1 x daily - 7 x weekly - 20 reps - 5 hold  - Supine Knee Extension Stretch on Towel Roll  - 1 x daily - 7 x weekly - 20 reps - 5 hold  - Supine Knee Extension Strengthening  - 1 x daily - 7 x weekly - 2 sets - 10 reps - 5 hold  - Active Straight Leg Raise with Quad Set  - 1 x daily - 7 x weekly - 2 sets - 10 reps  - Seated Hamstring Stretch  - 1 x daily - 7 x weekly - 3 reps - 30 hold  - Seated Calf Stretch with Strap  - 1 x daily - 7 x weekly - 3 reps - 30 hold  - Seated Knee Extension Stretch with Chair  - 1 x daily - 7 x weekly - 20 reps - 5 hold  - Seated Knee Flexion Stretch  - 1 x daily - 7 x weekly - 20 reps - 5 hold  - Sit to Stand  - 1 x daily - 7 x weekly - 2 sets - 10 reps  - Standing Knee Flexion Stretch on Step  - 1 x daily - 7 x weekly - 20 reps - 5 hold    Manual Therapy:    minutes      Neuromuscular Re-education:  minutes      Gait Training:    minutes      Therapeutic Activity:  minutes      Modalities:  Vasopneumatic Device  minutes  Electrical Stimulation  minutes  Ultrasound  minutes  Iontophoresis  minutes  Cold Pack  minutes    Evaluation Complexity: Low: 25 minutes; Moderate   minutes; Complex PT " Evaluation Time Entry: 25;  minutes    Re-Evaluation:   minutes

## 2025-03-05 NOTE — PROGRESS NOTES
"                                                                                                                         PHYSICAL THERAPY TREATMENT NOTE    Patient Name:  Navi Valdez   Patient MRN: [id number]  Date: 3/6/2025  Time Calculation  Start Time: 0425  Stop Time: 0510  Time Calculation (min): 45 min      Problem List Items Addressed This Visit             ICD-10-CM    Primary osteoarthritis of left knee M17.12       Insurance:  Visit number: 2 of [place]  Insurance Type: Payor: MEDICARE / Plan: MEDICARE PART A AND B / Product Type: *No Product type* /   Authorization or Plan of Care date Range:    General:  Reason for visit: s/p L TKA   Referred by: Edison Seth MD  Next MD appt: 3/11/25   Surgery: Left Total Knee Replacement - Left  Date of Last Surgery: 2/7/2025   Post-Op Days: 24    Precautions:  None, L SANDRA, Afib on Eloquis, HTN  Fall Risk: Low    Subjective:   Navi reports he feels like his condition is improving.  He has progressed self to cane in home, still using walker in community.   Pain (0-10): 7 /10 may have over done it moving around in his home yesterday.  He tried to move some furniture.     HEP adherence / understanding: patient reports compliance with the instructed home exercises.    Assessment:   Education:  Discussed hurt vs harm.  Educated patient on significance of restoring knee flexion ROM  Progress towards functional goals: needs encouragement to mobilize the knee.  Gait cycle is poor  Response to interventions: Patient tolerated therapeutic interventions well and without any adverse events.  Justification for continued skilled care: To address remaining functional, objective and subjective deficits to allow the patient to return to full independence with ADLs.    Plan:  Exercises targeting surrounding musculature to strengthen and improve function. Manual therapy to improve joint mobility.    Objective:  3/6: 5 - 84  3/3: 5-80    Treatment Performed: (\"NP\" = Not Performed) " "    Therapeutic Exercise:     45 minutes  Access Code: [email or web address]  Nustep x 10 minutes, Seat 12  Seated on plinth: knee flexion stretch 10\" x 10   Supine Heel Slide with Strap 20 reps - 5 hold  Seated Resisted knee flexion: Black x 10   Incline calf stretch 30\" x 3   LAQ: 3\" x 10   Sit to Stand  -  2 sets - 10 reps  Seated Hamstring Stretch, LLE propped on 6\" step, applying post pressure to the knee for ext: 30\" x 3   Standing Quad set into ball: 5\" x 10   Shuttle Bilateral LP: 50# 3 x 15     **ACTIVITIES BELOW WERE NOT PERFORMED**   Supine Knee Extension Stretch on Towel Roll   20 reps - 5 hold  Active Straight Leg Raise with Quad Set   2 sets - 10 reps  Seated Knee Flexion Stretch  20 reps - 5 hold  Standing Knee Flexion Stretch on Step  -20 reps - 5 hold    Manual Therapy:       minutes      Neuromuscular Re-education:      minutes      Modalities:       Vasopneumatic Device       minutes  Electrical Stimulation          minutes  Ultrasound            minutes  Iontophoresis                     minutes  Cold Pack            minutes  Mechanical Traction           minutes    Gait Training:            minutes      Aquatics:            minutes      Therapeutic Activity:      minutes      Self Care Home Management:    minutes    Canalith Reposition:          minutes     Education:          minutes    Other:       minutes      Evaluation Complexity: Low:   minutes; Moderate   minutes; Complex   minutes    Re-Evaluation:   minutes           "

## 2025-03-06 ENCOUNTER — TREATMENT (OUTPATIENT)
Dept: PHYSICAL THERAPY | Facility: CLINIC | Age: 70
End: 2025-03-06
Payer: MEDICARE

## 2025-03-06 DIAGNOSIS — Z96.652 STATUS POST TOTAL LEFT KNEE REPLACEMENT: ICD-10-CM

## 2025-03-06 DIAGNOSIS — M17.12 PRIMARY OSTEOARTHRITIS OF LEFT KNEE: ICD-10-CM

## 2025-03-06 PROCEDURE — 97110 THERAPEUTIC EXERCISES: CPT | Mod: GP | Performed by: PHYSICAL THERAPIST

## 2025-03-07 ENCOUNTER — TELEPHONE (OUTPATIENT)
Dept: ORTHOPEDIC SURGERY | Facility: CLINIC | Age: 70
End: 2025-03-07
Payer: MEDICARE

## 2025-03-07 DIAGNOSIS — M17.12 ARTHRITIS OF LEFT KNEE: ICD-10-CM

## 2025-03-07 RX ORDER — OXYCODONE HYDROCHLORIDE 5 MG/1
5 TABLET ORAL EVERY 6 HOURS PRN
Qty: 28 TABLET | Refills: 0 | Status: SHIPPED | OUTPATIENT
Start: 2025-03-07 | End: 2025-03-14

## 2025-03-07 RX ORDER — TRAMADOL HYDROCHLORIDE 50 MG/1
50 TABLET ORAL EVERY 6 HOURS PRN
Qty: 28 TABLET | Refills: 0 | Status: SHIPPED | OUTPATIENT
Start: 2025-03-07 | End: 2025-03-14

## 2025-03-07 NOTE — TELEPHONE ENCOUNTER
PT IS REQUESTING A REFILL ON OXYCODONE 5MG-3 TABS LEFT  TRAMADOL 50MG-3 TABS LEFT  LT TKA ON 2-7-25        [id number] - [place], [place] - [address] AT [facility]  [address] [place] [address]  Phone: [phone] Fax: [phone]

## 2025-03-10 ENCOUNTER — TREATMENT (OUTPATIENT)
Dept: PHYSICAL THERAPY | Facility: CLINIC | Age: 70
End: 2025-03-10
Payer: MEDICARE

## 2025-03-10 DIAGNOSIS — M17.12 PRIMARY OSTEOARTHRITIS OF LEFT KNEE: ICD-10-CM

## 2025-03-10 PROCEDURE — 97110 THERAPEUTIC EXERCISES: CPT | Mod: GP

## 2025-03-10 NOTE — PROGRESS NOTES
PHYSICAL THERAPY TREATMENT NOTE    Patient Name:  Navi Valdez   Patient MRN: [id number]  Date: 3/10/2025  Time Calculation  Start Time: 1117  Stop Time: 1145  Time Calculation (min): 28 min      Problem List Items Addressed This Visit             ICD-10-CM    Primary osteoarthritis of left knee M17.12         Insurance:  Visit number: 3 of [place]  Insurance Type: Payor: MEDICARE / Plan: MEDICARE PART A AND B / Product Type: *No Product type* /   Authorization or Plan of Care date Range:    General:  Reason for visit: s/p L TKA   Referred by: MD Nelida Dick MD appt: 3/11/25   Surgery: Left Total Knee Replacement - Left  Date of Last Surgery: 2/7/2025   Post-Op Days: 24    Precautions:  None, L SANDRA, Afib on Eloquis, HTN  Fall Risk: Low    Subjective:   Navi reports he feels like his condition is improving. Presents to PT ambulating with SPC. Patient reports  he was moving furniture around over the weekend which made his knee sore, but nothing excruciating. Denies any significant or concerning events since last visit.    Pain (0-10): 7    HEP adherence / understanding: patient reports compliance with the instructed home exercises.    Assessment:   Shortened session today due to time constraints.   Patient demonstrating and reporting good progression towards established goals and improved functional capacity.   Education: Reviewed home exercise program. Answered questions about home exercise program. Provided verbal feedback to improve exercise technique.  Progress towards functional goals:  Demonstrating gradual improvements in both L knee mobility and strength. Progressing towards less assistance from assistive device for flat ground ambulation.   Response to interventions:  Tolerated treatment session well. Limited by pain and stiffness from further progression of L knee flexion, but  "demonstrated significant improvement within today's session.  Justification for continued skilled care: To address remaining functional, objective and subjective deficits to allow the patient to return to full independence with ADLs. Skilled intervention required to improve ROM which will improve function. Modify and progress home exercise program.    Plan:  Continue to progress L knee strength and mobility per patient tolerance.    Objective:   L knee AROM    3/10 - -12-74 degrees (-4-81 degrees post session)  3/6: 5 - 84  3/3: 5-80    Treatment Performed: (\"NP\" = Not Performed)     Therapeutic Exercise:     23 minutes  Access Code: [email or web address]  Nustep x 7 minutes, Seat 13  Supine Heel Slide with Strap 10 reps - 5 hold  Long sit knee extension stretch with strap OP above knee x10 10 sec holds  Shuttle Bilateral LP: 62# 3 x 15       **ACTIVITIES BELOW WERE NOT PERFORMED**     Seated on plinth: knee flexion stretch 10\" x 10   Seated Resisted knee flexion: Black x 10   Incline calf stretch 30\" x 3   LAQ: 3\" x 10   Sit to Stand  -  2 sets - 10 reps  Seated Hamstring Stretch, LLE propped on 6\" step, applying post pressure to the knee for ext: 30\" x 3   Standing Quad set into ball: 5\" x 10   Supine Knee Extension Stretch on Towel Roll   20 reps - 5 hold  Active Straight Leg Raise with Quad Set   2 sets - 10 reps  Seated Knee Flexion Stretch  20 reps - 5 hold  Standing Knee Flexion Stretch on Step  -20 reps - 5 hold    Manual Therapy:     5 minutes  Seated tibial distraction with IR MWM into flexion x6 20 sec holds      Neuromuscular Re-education:      minutes      Modalities:       Vasopneumatic Device       minutes  Electrical Stimulation          minutes  Ultrasound            minutes  Iontophoresis                     minutes  Cold Pack            minutes  Mechanical Traction           minutes    Gait Training:            minutes      Aquatics:            minutes      Therapeutic Activity:      minutes      Self Care " Home Management:    minutes    Canalith Reposition:          minutes     Education:          minutes    Other:       minutes      Evaluation Complexity: Low:   minutes; Moderate   minutes; Complex   minutes    Re-Evaluation:   minutes

## 2025-03-11 ENCOUNTER — OFFICE VISIT (OUTPATIENT)
Dept: ORTHOPEDIC SURGERY | Facility: CLINIC | Age: 70
End: 2025-03-11
Payer: MEDICARE

## 2025-03-11 ENCOUNTER — HOSPITAL ENCOUNTER (OUTPATIENT)
Dept: RADIOLOGY | Facility: CLINIC | Age: 70
Discharge: HOME | End: 2025-03-11
Payer: MEDICARE

## 2025-03-11 VITALS — HEIGHT: 73 IN | WEIGHT: 255 LBS | BODY MASS INDEX: 33.8 KG/M2

## 2025-03-11 DIAGNOSIS — Z96.652 HISTORY OF LEFT KNEE REPLACEMENT: Primary | ICD-10-CM

## 2025-03-11 DIAGNOSIS — Z96.652 STATUS POST TOTAL LEFT KNEE REPLACEMENT: ICD-10-CM

## 2025-03-11 PROCEDURE — 73560 X-RAY EXAM OF KNEE 1 OR 2: CPT | Mod: LT

## 2025-03-11 PROCEDURE — 99211 OFF/OP EST MAY X REQ PHY/QHP: CPT | Performed by: ORTHOPAEDIC SURGERY

## 2025-03-11 PROCEDURE — 1159F MED LIST DOCD IN RCRD: CPT | Performed by: ORTHOPAEDIC SURGERY

## 2025-03-11 PROCEDURE — 3008F BODY MASS INDEX DOCD: CPT | Performed by: ORTHOPAEDIC SURGERY

## 2025-03-13 ENCOUNTER — TREATMENT (OUTPATIENT)
Dept: PHYSICAL THERAPY | Facility: CLINIC | Age: 70
End: 2025-03-13
Payer: MEDICARE

## 2025-03-13 DIAGNOSIS — M17.12 PRIMARY OSTEOARTHRITIS OF LEFT KNEE: Primary | ICD-10-CM

## 2025-03-13 PROCEDURE — 97110 THERAPEUTIC EXERCISES: CPT | Mod: GP,CQ

## 2025-03-13 NOTE — PROGRESS NOTES
PHYSICAL THERAPY TREATMENT NOTE    Patient Name:  Navi Valdez   Patient MRN: [id number]  Date: 3/13/2025  Time Calculation  Start Time: 1030  Stop Time: 1120  Time Calculation (min): 50 min      Problem List Items Addressed This Visit             ICD-10-CM    Primary osteoarthritis of left knee - Primary M17.12         Insurance:  Visit number: 4 [facility]  Insurance Type: Payor: MEDICARE / Plan: MEDICARE PART A AND B / Product Type: *No Product type* /   Authorization or Plan of Care date Range:    General:  Reason for visit: s/p L TKA   Referred by: MD Nelida Dick MD appt: 3/11/25   Surgery: Left Total Knee Replacement - Left  Date of Last Surgery: 2/7/2025   Post-Op Days: 24    Precautions:  None, L SANDRA, Afib on Eloquis, HTN  Fall Risk: Low    Subjective:   Navi reports he feels like his condition is improving. Presents to PT ambulating with SPC. Patient reports  he gave up the walker 2 days ago. Reports he saw the MD yesterday and MD wants more bend, otherwise will do a manipulation. MD gave him squats to do on the counter at home . Told MD he is considering retiring after this .   Pain (0-10): 7    HEP adherence / understanding: patient reports compliance with the instructed home exercises.    Assessment:   Patient demonstrating and reporting good progression towards established goals and improved functional capacity.   Education: Reviewed home exercise program. Answered questions about home exercise program. Provided verbal feedback to improve exercise technique.Updated HEP with step ups and prone exercises.  Progress towards functional goals:  Demonstrating gradual improvements in both L knee mobility and strength. Progressing towards improved ambulation with full time use of cane..   Response to interventions:  Tolerated treatment session well. Limited by pain and stiffness from further  "progression of L knee flexion, but demonstrated significant improvement within today's session.Pt. reported positive response to prone exercises and step ups.   Justification for continued skilled care: To address remaining functional, objective and subjective deficits to allow the patient to return to full independence with ADLs. Skilled intervention required to improve ROM which will improve function. Modify and progress home exercise program.    Plan:  Continue to progress L knee strength and mobility per patient tolerance.    Objective:   L knee AROM  3/13/25  8- 85 post exercise  3/10 - -12-74 degrees (-4-81 degrees post session)  3/6: 5 - 84  3/3: 5-80    Treatment Performed: (\"NP\" = Not Performed)     Therapeutic Exercise:     45 minutes  Access Code: [email or web address]  Nustep x 7 minutes, Seat 13 4 min   seat 12 4 min   Squats 5 sec 20x   Slant board stretch 30 sec 3x   LAQ 2 x 10  Supine Heel Slide with Strap 10 reps - 5 hold  Long sit knee extension stretch with strap OP above knee x10 10 sec holds  Shuttle Bilateral LP: 62# 3 x 15   24\" box Staggered STS with arms crossed 10x   Standing Knee Flexion Stretch on Step  -6\" 10 sec 10x  Step ups F /L 15x 4\" with UNI UE support   Supine Knee Extension Stretch on Towel Roll   10 reps - 5 hold  Prone knee flexion 5 sec 5x   Prone quad stretch with 30 sec 5x with green strap    **ACTIVITIES BELOW WERE NOT PERFORMED**     Seated on plinth: knee flexion stretch 10\" x 10   Seated Resisted knee flexion: Black x 10   Seated Hamstring Stretch, LLE propped on 6\" step, applying post pressure to the knee for ext: 30\" x 3   Standing Quad set into ball: 5\" x 10   Active Straight Leg Raise with Quad Set   2 sets - 10 reps  Seated Knee Flexion Stretch  20 reps - 5 hold      Manual Therapy:     5 minutes  Seated tibial distraction with IR MWM into flexion x6 20 sec holds      Neuromuscular Re-education:      minutes      Modalities:       Vasopneumatic Device       minutes  Electrical " Stimulation          minutes  Ultrasound            minutes  Iontophoresis                     minutes  Cold Pack            minutes  Mechanical Traction           minutes    Gait Training:            minutes        Re-Evaluation:   minutes

## 2025-03-14 ENCOUNTER — TELEPHONE (OUTPATIENT)
Dept: ORTHOPEDIC SURGERY | Facility: CLINIC | Age: 70
End: 2025-03-14
Payer: MEDICARE

## 2025-03-14 DIAGNOSIS — M17.12 ARTHRITIS OF LEFT KNEE: ICD-10-CM

## 2025-03-14 RX ORDER — TRAMADOL HYDROCHLORIDE 50 MG/1
50 TABLET ORAL EVERY 6 HOURS PRN
Qty: 28 TABLET | Refills: 0 | Status: SHIPPED | OUTPATIENT
Start: 2025-03-14 | End: 2025-03-21 | Stop reason: SDUPTHER

## 2025-03-14 RX ORDER — OXYCODONE HYDROCHLORIDE 5 MG/1
5 TABLET ORAL EVERY 6 HOURS PRN
Qty: 28 TABLET | Refills: 0 | Status: SHIPPED | OUTPATIENT
Start: 2025-03-14 | End: 2025-03-21 | Stop reason: SDUPTHER

## 2025-03-14 NOTE — TELEPHONE ENCOUNTER
Pt is requesting a refill on   Oxycodone 5mg-  Tramadol-50mg            [id number] - [place], [place] - [address] AT [facility]  [address] [place] [address]  Phone: [phone] Fax: [phone]

## 2025-03-14 NOTE — PROGRESS NOTES
Seen in follow-up after left total knee arthroplasty 2/7/25.  Progressing as expected.    Incision healed without evidence of infection.  There is resolving swelling.    Range of motion 3-85    AP/lateral x-rays of the knee are personally reviewed and the total knee arthroplasty is in good position and well fixed.    Progressing as expected.  Precautions were reviewed.  Continue with physical therapy and follow-up with x-rays.

## 2025-03-17 ENCOUNTER — TREATMENT (OUTPATIENT)
Dept: PHYSICAL THERAPY | Facility: CLINIC | Age: 70
End: 2025-03-17
Payer: MEDICARE

## 2025-03-17 DIAGNOSIS — M17.12 PRIMARY OSTEOARTHRITIS OF LEFT KNEE: ICD-10-CM

## 2025-03-17 PROCEDURE — 97110 THERAPEUTIC EXERCISES: CPT | Mod: GP | Performed by: PHYSICAL THERAPIST

## 2025-03-17 NOTE — PROGRESS NOTES
PHYSICAL THERAPY TREATMENT NOTE    Patient Name:  Navi Valdez   Patient MRN: [id number]  Date: 3/17/2025  Time Calculation  Start Time: 1115  Stop Time: 1200  Time Calculation (min): 45 min      Problem List Items Addressed This Visit             ICD-10-CM    Primary osteoarthritis of left knee M17.12     Insurance:  Visit number: 5   Insurance Type: Payor: MEDICARE / Plan: MEDICARE PART A AND B / Product Type: *No Product type* /   Authorization or Plan of Care date Range: n/a    General:  Reason for visit: s/p L TKA   Referred by: Edison Seth MD  Next MD appt: 3/11/25   Surgery: Left Total Knee Replacement - Left  Date of Last Surgery: 2/7/2025   Post-Op Days: 24    Precautions:  None, L SANDRA, Afib on Eloquis, HTN  Fall Risk: Low    Subjective:  Navi reports he feels like his condition is improving.  Feels he is getting stronger, also easier getting in car. Still having a hard time sleeping  Pain (0-10): 1-2   HEP adherence / understanding: patient reports compliance with the instructed home exercises.    Assessment:  Patient will continue to benefit from aggressive stretching  Education: Reviewed home exercise program. Provided verbal feedback to improve exercise technique.  Progress towards functional goals: Improved gait quality. Improved walking distance. Reduced frequency of pain. Reduced intensity of pain.  Response to interventions: Tolerated treatment session well without any increase in pain. Improved joint mobility.  Improved independence with home exercises. Improved tolerance to strengthening progressions.  Justification for continued skilled care: To address remaining functional, objective and subjective deficits to allow the patient to return to full independence with ADLs. Skilled intervention required to improve ROM which will improve function. Progressive strengthening to stabilize the knee to improve function.    Plan:  Exercises targeting surrounding musculature to strengthen and improve  "function. Manual therapy to improve joint mobility. Exercises to gradually increase flexibility and range of motion of the knee.      Objective:   L knee AROM  94 deg flexion    Treatment Performed: (\"NP\" = Not Performed)     Therapeutic Exercise:     45 minutes  Access Code: [email or web address]  Nustep x 8 min  Slant board stretch 30 sec 3x   Standing hamstring stretch 30s x 3  LAQ 2 x 10 5#   Seated hamstring curl G 2x10  Supine Heel Slide with Strap 10 reps - 5 hold-NP  Long sit knee extension stretch with strap OP above knee x10 10 sec holds-NP  Shuttle leg press B/U 62/37# 2x10 ea  24\" box Staggered STS with arms crossed 10x   Standing Knee Flexion Stretch on Step 8\" 10s x 10  Step ups F/L 6\" 2x10 ea   Prone quad stretch with 30 sec 5x with green strap  Performed manual knee stretching with flexion/extension OP      Manual Therapy:       minutes        Neuromuscular Re-education:      minutes      Modalities:       Vasopneumatic Device       minutes  Electrical Stimulation          minutes  Ultrasound            minutes  Iontophoresis                     minutes  Cold Pack            minutes  Mechanical Traction           minutes    Gait Training:            minutes        Re-Evaluation:   minutes      "

## 2025-03-20 ENCOUNTER — TREATMENT (OUTPATIENT)
Dept: PHYSICAL THERAPY | Facility: CLINIC | Age: 70
End: 2025-03-20
Payer: MEDICARE

## 2025-03-20 DIAGNOSIS — M17.12 PRIMARY OSTEOARTHRITIS OF LEFT KNEE: ICD-10-CM

## 2025-03-20 PROCEDURE — 97110 THERAPEUTIC EXERCISES: CPT | Mod: GP | Performed by: PHYSICAL THERAPIST

## 2025-03-20 NOTE — PROGRESS NOTES
PHYSICAL THERAPY TREATMENT NOTE    Patient Name:  Navi Valdez   Patient MRN: [id number]  Date: 3/20/2025  Time Calculation  Start Time: 1115  Stop Time: 1200  Time Calculation (min): 45 min      Problem List Items Addressed This Visit             ICD-10-CM    Primary osteoarthritis of left knee M17.12       Insurance:  Visit number: 6   Insurance Type: Payor: MEDICARE / Plan: MEDICARE PART A AND B / Product Type: *No Product type* /   Authorization or Plan of Care date Range: n/a    General:  Reason for visit: s/p L TKA   Referred by: Edison Seth MD  Next MD appt: 3/11/25   Surgery: Left Total Knee Replacement - Left  Date of Last Surgery: 2/7/2025   Post-Op Days: 24    Precautions:  None, L SANDRA, Afib on Eloquis, HTN  Fall Risk: Low    Subjective:  Navi reports he feels like his condition is improving.  Sleeping still rough, tight today with the weather changes  Pain (0-10): 1-2   HEP adherence / understanding: patient reports compliance with the instructed home exercises.    Assessment:  Patient will continue to benefit from aggressive stretching  Education: Reviewed home exercise program. Provided verbal feedback to improve exercise technique.  Progress towards functional goals: Improved gait quality. Improved walking distance. Reduced frequency of pain. Reduced intensity of pain.  Response to interventions: Tolerated treatment session well without any increase in pain. Improved joint mobility.  Improved independence with home exercises. Improved tolerance to strengthening progressions.  Justification for continued skilled care: To address remaining functional, objective and subjective deficits to allow the patient to return to full independence with ADLs. Skilled intervention required to improve ROM which will improve function. Progressive strengthening to stabilize the knee to improve function.    Plan:  Exercises targeting surrounding musculature to strengthen and improve function. Manual therapy to improve  "joint mobility. Exercises to gradually increase flexibility and range of motion of the knee.      Objective:   L knee AROM  92 deg flexion, best 94 deg  Lacking 5 deg of extension    Treatment Performed: (\"NP\" = Not Performed)     Therapeutic Exercise:     45 minutes  Access Code: [email or web address]  Nustep x 8 min  Slant board stretch 30 sec 3x   Standing hamstring stretch 30s x 3  LAQ 2 x 10 5#   Seated hamstring curl G 2x10  Supine Heel Slide with Strap 10 reps - 5 hold-NP  Long sit knee extension stretch with strap OP above knee x10 10 sec holds-NP  Shuttle leg press B/U 62/37# 2x10 ea  24\" box Staggered STS with arms crossed 10x   Standing Knee Flexion Stretch on Step 8\" 10s x 10  Step ups F/L 6\" 2x10 ea   Prone quad stretch with 30 sec 5x with green strap  Performed manual knee stretching with flexion/extension OP      Manual Therapy:       minutes        Neuromuscular Re-education:      minutes      Modalities:       Vasopneumatic Device       minutes  Electrical Stimulation          minutes  Ultrasound            minutes  Iontophoresis                     minutes  Cold Pack            minutes  Mechanical Traction           minutes    Gait Training:            minutes        Re-Evaluation:   minutes      "

## 2025-03-21 ENCOUNTER — TELEPHONE (OUTPATIENT)
Dept: ORTHOPEDIC SURGERY | Facility: CLINIC | Age: 70
End: 2025-03-21
Payer: MEDICARE

## 2025-03-21 RX ORDER — OXYCODONE HYDROCHLORIDE 5 MG/1
5 TABLET ORAL EVERY 8 HOURS PRN
Qty: 21 TABLET | Refills: 0 | Status: SHIPPED | OUTPATIENT
Start: 2025-03-21 | End: 2025-03-28

## 2025-03-21 RX ORDER — TRAMADOL HYDROCHLORIDE 50 MG/1
50 TABLET ORAL EVERY 8 HOURS PRN
Qty: 21 TABLET | Refills: 0 | Status: SHIPPED | OUTPATIENT
Start: 2025-03-21 | End: 2025-03-28

## 2025-03-24 ENCOUNTER — TREATMENT (OUTPATIENT)
Dept: PHYSICAL THERAPY | Facility: CLINIC | Age: 70
End: 2025-03-24
Payer: MEDICARE

## 2025-03-24 DIAGNOSIS — M17.12 PRIMARY OSTEOARTHRITIS OF LEFT KNEE: ICD-10-CM

## 2025-03-24 PROCEDURE — 97110 THERAPEUTIC EXERCISES: CPT | Mod: GP | Performed by: PHYSICAL THERAPIST

## 2025-03-24 NOTE — PROGRESS NOTES
PHYSICAL THERAPY TREATMENT NOTE    Patient Name:  Navi Valdez   Patient MRN: [id number]  Date: 3/24/2025  Time Calculation  Start Time: 1115  Stop Time: 1200  Time Calculation (min): 45 min      Problem List Items Addressed This Visit             ICD-10-CM    Primary osteoarthritis of left knee M17.12       Insurance:  Visit number: 7   Insurance Type: Payor: MEDICARE / Plan: MEDICARE PART A AND B / Product Type: *No Product type* /   Authorization or Plan of Care date Range: n/a    General:  Reason for visit: s/p L TKA   Referred by: MD Nelida Dick MD appt: 3/11/25   Surgery: Left Total Knee Replacement - Left  Date of Last Surgery: 2/7/2025   Post-Op Days: 24    Precautions:  None, L SANDRA, Afib on Eloquis, HTN  Fall Risk: Low    Subjective:  Navi reports he feels like his condition is improving. Tightness in the knee, up and down depending on the day  Pain (0-10): 1-2   HEP adherence / understanding: patient reports compliance with the instructed home exercises.    Assessment:  Patient will continue to benefit from aggressive stretching as well as quad strength  Education: Reviewed home exercise program. Provided verbal feedback to improve exercise technique.  Progress towards functional goals: Improved gait quality. Improved walking distance. Reduced frequency of pain. Reduced intensity of pain.  Response to interventions: Tolerated treatment session well without any increase in pain. Improved joint mobility.  Improved independence with home exercises. Improved tolerance to strengthening progressions.  Justification for continued skilled care: To address remaining functional, objective and subjective deficits to allow the patient to return to full independence with ADLs. Skilled intervention required to improve ROM which will improve function. Progressive strengthening to stabilize the knee to improve function.    Plan:  Exercises targeting surrounding musculature to strengthen and improve function. Manual  "therapy to improve joint mobility. Exercises to gradually increase flexibility and range of motion of the knee.      Objective:   L knee AROM  99 deg flexion  Lacking 5 deg of extension    Treatment Performed: (\"NP\" = Not Performed)     Therapeutic Exercise:     45 minutes  Access Code: [email or web address]  Nustep x 8 min seat 10  Slant board stretch 30 sec 3x   Standing hamstring stretch 30s x 3  LAQ 2 x 10 5#   Seated hamstring curl G 2x10  Shuttle leg press B/U 75/37# 2x10 ea  Standing Knee Flexion Stretch on Step 8\" 10s x 10  Step ups F/L 8\" 2x10 ea   Step down 4\" 2x10  Prone quad stretch with 30 sec 5x with green strap  Performed manual knee stretching with flexion/extension OP with joint oscillations       Manual Therapy:       minutes        Neuromuscular Re-education:      minutes      Modalities:       Vasopneumatic Device       minutes  Electrical Stimulation          minutes  Ultrasound            minutes  Iontophoresis                     minutes  Cold Pack            minutes  Mechanical Traction           minutes    Gait Training:            minutes        Re-Evaluation:   minutes      "

## 2025-03-27 ENCOUNTER — TREATMENT (OUTPATIENT)
Dept: PHYSICAL THERAPY | Facility: CLINIC | Age: 70
End: 2025-03-27
Payer: MEDICARE

## 2025-03-27 DIAGNOSIS — M17.12 PRIMARY OSTEOARTHRITIS OF LEFT KNEE: ICD-10-CM

## 2025-03-27 PROCEDURE — 97110 THERAPEUTIC EXERCISES: CPT | Mod: GP | Performed by: PHYSICAL THERAPIST

## 2025-03-27 NOTE — PROGRESS NOTES
PHYSICAL THERAPY TREATMENT NOTE    Patient Name:  Navi Valdez   Patient MRN: [id number]  Date: 3/27/2025  Time Calculation  Start Time: 1115  Stop Time: 1200  Time Calculation (min): 45 min      Problem List Items Addressed This Visit             ICD-10-CM    Primary osteoarthritis of left knee M17.12     Insurance:  Visit number: 8   Insurance Type: Payor: MEDICARE / Plan: MEDICARE PART A AND B / Product Type: *No Product type* /   Authorization or Plan of Care date Range: n/a    General:  Reason for visit: s/p L TKA   Referred by: MD Nelida Dick MD appt: 4/22/25   Surgery: Left Total Knee Replacement - Left  Date of Last Surgery: 2/7/2025     Precautions:  None, L SANDRA, Afib on Eloquis, HTN  Fall Risk: Low    Subjective:  Navi reports he feels like his condition is improving. Continues to be tight. Able to walk longer distances   Pain (0-10): 1-2   HEP adherence / understanding: patient reports compliance with the instructed home exercises.    Assessment:  Patient will continue to benefit from aggressive stretching as well as quad strength  Education: Reviewed home exercise program. Provided verbal feedback to improve exercise technique.  Progress towards functional goals: Improved gait quality. Improved walking distance. Reduced frequency of pain. Reduced intensity of pain.  Response to interventions: Tolerated treatment session well without any increase in pain. Improved joint mobility.  Improved independence with home exercises. Improved tolerance to strengthening progressions.  Justification for continued skilled care: To address remaining functional, objective and subjective deficits to allow the patient to return to full independence with ADLs. Skilled intervention required to improve ROM which will improve function. Progressive strengthening to stabilize the knee to improve function.    Plan:  Exercises targeting surrounding musculature to strengthen and improve function. Manual therapy to improve  "joint mobility. Exercises to gradually increase flexibility and range of motion of the knee.      Objective:   L knee AROM  99 deg flexion  Lacking 5 deg of extension    Treatment Performed: (\"NP\" = Not Performed)     Therapeutic Exercise:     45 minutes  Access Code: [email or web address]  Nustep x 8 min seat 9  Slant board stretch 30 sec 3x   Standing hamstring stretch 30s x 3  LAQ 2 x 10 5#   Seated hamstring curl G 2x10  Shuttle leg press B/U 75/37# 2x10 ea  Standing Knee Flexion Stretch on Step 16\" 10s x 10  Step ups F/L 8\" 2x10 ea   Step down 4\" 2x10  Prone quad stretch with 30 sec 5x with green strap  Performed manual knee stretching with flexion/extension OP with joint oscillations       Manual Therapy:       minutes        Neuromuscular Re-education:      minutes      Modalities:       Vasopneumatic Device       minutes  Electrical Stimulation          minutes  Ultrasound            minutes  Iontophoresis                     minutes  Cold Pack            minutes  Mechanical Traction           minutes    Gait Training:            minutes        Re-Evaluation:   minutes      "

## 2025-03-28 ENCOUNTER — TELEPHONE (OUTPATIENT)
Dept: ORTHOPEDIC SURGERY | Facility: CLINIC | Age: 70
End: 2025-03-28
Payer: MEDICARE

## 2025-03-28 DIAGNOSIS — M17.12 ARTHRITIS OF LEFT KNEE: ICD-10-CM

## 2025-03-28 RX ORDER — TRAMADOL HYDROCHLORIDE 50 MG/1
50 TABLET ORAL EVERY 8 HOURS PRN
Qty: 21 TABLET | Refills: 0 | Status: SHIPPED | OUTPATIENT
Start: 2025-03-28 | End: 2025-04-04 | Stop reason: SDUPTHER

## 2025-03-28 RX ORDER — OXYCODONE HYDROCHLORIDE 5 MG/1
5 TABLET ORAL EVERY 8 HOURS PRN
Qty: 21 TABLET | Refills: 0 | Status: SHIPPED | OUTPATIENT
Start: 2025-03-28 | End: 2025-04-04 | Stop reason: SDUPTHER

## 2025-03-28 NOTE — TELEPHONE ENCOUNTER
S/p lt tka dos: 2/7    OXYCODONE 5 MG (HAS 2 LEFT)  Last rx: 3/21  TRAMADOL 50 MG (HAS 3 LEFT)  Last rx: 3/21      Oarrs on file  Pharmacy: [facility] on [address], [place]    Pt requesting a letter keeping him out of work until his next evaulation with Dr. Seth on 4/22 please advise.

## 2025-03-31 ENCOUNTER — TREATMENT (OUTPATIENT)
Dept: PHYSICAL THERAPY | Facility: CLINIC | Age: 70
End: 2025-03-31
Payer: MEDICARE

## 2025-03-31 DIAGNOSIS — M17.12 PRIMARY OSTEOARTHRITIS OF LEFT KNEE: ICD-10-CM

## 2025-03-31 PROCEDURE — 97110 THERAPEUTIC EXERCISES: CPT | Mod: GP | Performed by: PHYSICAL THERAPIST

## 2025-03-31 NOTE — PROGRESS NOTES
PHYSICAL THERAPY TREATMENT NOTE    Patient Name:  Navi Valdez   Patient MRN: [id number]  Date: 3/31/2025  Time Calculation  Start Time: 1115  Stop Time: 1200  Time Calculation (min): 45 min      Problem List Items Addressed This Visit             ICD-10-CM    Primary osteoarthritis of left knee M17.12       Insurance:  Visit number: 9   Insurance Type: Payor: MEDICARE / Plan: MEDICARE PART A AND B / Product Type: *No Product type* /   Authorization or Plan of Care date Range: n/a    General:  Reason for visit: s/p L TKA   Referred by: MD Nelida Dick MD appt: 4/22/25   Surgery: Left Total Knee Replacement - Left  Date of Last Surgery: 2/7/2025     Precautions:  None, L SANDRA, Afib on Eloquis, HTN  Fall Risk: Low    Subjective:  Navi reports he feels like his condition is improving. No new complaints   Pain (0-10): 1-2   HEP adherence / understanding: patient reports compliance with the instructed home exercises.    Assessment:  Patient will continue to benefit from aggressive stretching as well as quad strength  Education: Reviewed home exercise program. Provided verbal feedback to improve exercise technique.  Progress towards functional goals: Improved gait quality. Improved walking distance. Reduced frequency of pain. Reduced intensity of pain.  Response to interventions: Tolerated treatment session well without any increase in pain. Improved joint mobility.  Improved independence with home exercises. Improved tolerance to strengthening progressions.  Justification for continued skilled care: To address remaining functional, objective and subjective deficits to allow the patient to return to full independence with ADLs. Skilled intervention required to improve ROM which will improve function. Progressive strengthening to stabilize the knee to improve function.    Plan:  Exercises targeting surrounding musculature to strengthen and improve function. Manual therapy to improve joint mobility. Exercises to  "gradually increase flexibility and range of motion of the knee.      Objective:   L knee AROM  100 deg flexion  Lacking 3 deg of extension    Treatment Performed: (\"NP\" = Not Performed)     Therapeutic Exercise:     45 minutes  Access Code: [email or web address]  Nustep x 8 min seat 9  Slant board stretch 30 sec 3x   Standing hamstring stretch 30s x 3  LAQ 2 x 10 5#   Seated hamstring curl G 2x10  Shuttle leg press B/U 75/37# 2x10 ea  Standing Knee Flexion Stretch on Step 16\" 10s x 10  Step ups F/L 8\" 2x10 ea   Step down 6\" 2x10  Prone quad stretch with 30 sec 5x with green strap  Performed manual knee stretching with flexion/extension OP with joint oscillations       Manual Therapy:       minutes        Neuromuscular Re-education:      minutes      Modalities:       Vasopneumatic Device       minutes  Electrical Stimulation          minutes  Ultrasound            minutes  Iontophoresis                     minutes  Cold Pack            minutes  Mechanical Traction           minutes    Gait Training:            minutes        Re-Evaluation:   minutes      "

## 2025-04-01 PROBLEM — M17.12 ARTHRITIS OF KNEE, LEFT: Status: RESOLVED | Noted: 2025-02-07 | Resolved: 2025-04-01

## 2025-04-01 PROBLEM — M17.12 PRIMARY OSTEOARTHRITIS OF LEFT KNEE: Status: RESOLVED | Noted: 2025-03-03 | Resolved: 2025-04-01

## 2025-04-01 PROBLEM — M17.12 ARTHRITIS OF LEFT KNEE: Status: RESOLVED | Noted: 2024-11-26 | Resolved: 2025-04-01

## 2025-04-03 ENCOUNTER — TREATMENT (OUTPATIENT)
Dept: PHYSICAL THERAPY | Facility: CLINIC | Age: 70
End: 2025-04-03
Payer: MEDICARE

## 2025-04-03 DIAGNOSIS — M17.12 PRIMARY OSTEOARTHRITIS OF LEFT KNEE: ICD-10-CM

## 2025-04-03 PROCEDURE — 97110 THERAPEUTIC EXERCISES: CPT | Mod: GP | Performed by: PHYSICAL THERAPIST

## 2025-04-03 NOTE — PROGRESS NOTES
PHYSICAL THERAPY TREATMENT NOTE    Patient Name:  Navi Valdez   Patient MRN: [id number]  Date: 4/3/2025  Time Calculation  Start Time: 1115  Stop Time: 1155  Time Calculation (min): 40 min      Problem List Items Addressed This Visit             ICD-10-CM    Primary osteoarthritis of left knee M17.12     Insurance:  Visit number: 10   Insurance Type: Payor: MEDICARE / Plan: MEDICARE PART A AND B / Product Type: *No Product type* /   Authorization or Plan of Care date Range: n/a    General:  Reason for visit: s/p L TKA   Referred by: MD Nelida Dick MD appt: 4/22/25   Surgery: Left Total Knee Replacement - Left  Date of Last Surgery: 2/7/2025     Precautions:  None, L SANDRA, Afib on Eloquis, HTN  Fall Risk: Low    Subjective:  Navi reports he feels like his condition is improving. Fatigue after stair climbing   Pain (0-10): 1-2   HEP adherence / understanding: patient reports compliance with the instructed home exercises.    Assessment:  Education: Reviewed home exercise program. Provided verbal feedback to improve exercise technique.  Progress towards functional goals: Improved gait quality. Improved walking distance. Reduced frequency of pain. Reduced intensity of pain.  Response to interventions: Tolerated treatment session well without any increase in pain. Improved joint mobility.  Improved independence with home exercises. Improved tolerance to strengthening progressions.  Justification for continued skilled care: To address remaining functional, objective and subjective deficits to allow the patient to return to full independence with ADLs. Skilled intervention required to improve ROM which will improve function. Progressive strengthening to stabilize the knee to improve function.    Plan:  Exercises targeting surrounding musculature to strengthen and improve function. Manual therapy to improve joint mobility. Exercises to gradually increase flexibility and range of motion of the knee.      Objective:   L  "knee AROM  105 deg flexion  Lacking 3 deg of extension    Treatment Performed: (\"NP\" = Not Performed)     Therapeutic Exercise:     40 minutes  Access Code: [email or web address]  Nustep x 8 min seat 9  Slant board stretch 30 sec 3x   Standing hamstring stretch 30s x 3  LAQ 2 x 10 5#   Seated hamstring curl G 2x10  Shuttle leg press B/U 75/37# 2x10 ea  Standing Knee Flexion Stretch on Step 16\" 10s x 10  Step ups F/L 8\" 2x10 ea   Step down 6\" 2x10  Prone quad stretch with 30 sec 5x with green strap  Performed manual knee stretching with flexion/extension OP with joint oscillations       Manual Therapy:       minutes        Neuromuscular Re-education:      minutes      Modalities:       Vasopneumatic Device       minutes  Electrical Stimulation          minutes  Ultrasound            minutes  Iontophoresis                     minutes  Cold Pack            minutes  Mechanical Traction           minutes    Gait Training:            minutes        Re-Evaluation:   minutes      "

## 2025-04-04 ENCOUNTER — TELEPHONE (OUTPATIENT)
Dept: ORTHOPEDIC SURGERY | Facility: CLINIC | Age: 70
End: 2025-04-04
Payer: MEDICARE

## 2025-04-04 RX ORDER — TRAMADOL HYDROCHLORIDE 50 MG/1
50 TABLET ORAL EVERY 8 HOURS PRN
Qty: 21 TABLET | Refills: 0 | Status: SHIPPED | OUTPATIENT
Start: 2025-04-04 | End: 2025-04-11

## 2025-04-04 RX ORDER — OXYCODONE HYDROCHLORIDE 5 MG/1
5 TABLET ORAL EVERY 8 HOURS PRN
Qty: 21 TABLET | Refills: 0 | Status: SHIPPED | OUTPATIENT
Start: 2025-04-04 | End: 2025-04-11

## 2025-04-04 NOTE — TELEPHONE ENCOUNTER
Patient of [facility] S/P LT TKA on 2/7/25 asking for refills on Tramadol 50 mg and Oxycodone 5 MG. Last rx for each was 3/28/25 -  Tramadol - 1 po Q 8 hrs PRN # 21 0 RF- 0 Left   Oxycodone - 1 po Q 8 hrs PRN # 21 0 RF. - 0 left    OARRS on file from 2/28/25. [facility] in [place] on file.     Thanks Mariam

## 2025-04-07 ENCOUNTER — TREATMENT (OUTPATIENT)
Dept: PHYSICAL THERAPY | Facility: CLINIC | Age: 70
End: 2025-04-07
Payer: MEDICARE

## 2025-04-07 DIAGNOSIS — M17.12 PRIMARY OSTEOARTHRITIS OF LEFT KNEE: ICD-10-CM

## 2025-04-07 PROCEDURE — 97110 THERAPEUTIC EXERCISES: CPT | Mod: GP | Performed by: PHYSICAL THERAPIST

## 2025-04-07 NOTE — PROGRESS NOTES
PHYSICAL THERAPY TREATMENT NOTE    Patient Name:  Navi Valdez   Patient MRN: [id number]  Date: 4/7/2025  Time Calculation  Start Time: 1115  Stop Time: 1200  Time Calculation (min): 45 min      Problem List Items Addressed This Visit             ICD-10-CM    Primary osteoarthritis of left knee M17.12       Insurance:  Visit number: 11   Insurance Type: Payor: MEDICARE / Plan: MEDICARE PART A AND B / Product Type: *No Product type* /   Authorization or Plan of Care date Range: n/a    General:  Reason for visit: s/p L TKA   Referred by: MD Nelida Dick MD appt: 4/22/25   Surgery: Left Total Knee Replacement - Left  Date of Last Surgery: 2/7/2025     Precautions:  None, L SANDRA, Afib on Eloquis, HTN  Fall Risk: Low    Subjective:  Navi reports he feels like his condition is improving and pain is decreasing.   Pain (0-10): 1-2   HEP adherence / understanding: patient reports compliance with the instructed home exercises.    Assessment:  Education: Reviewed home exercise program. Provided verbal feedback to improve exercise technique.  Progress towards functional goals: Improved gait quality. Improved walking distance. Reduced frequency of pain. Reduced intensity of pain.  Response to interventions: Tolerated treatment session well without any increase in pain. Improved joint mobility.  Improved independence with home exercises. Improved tolerance to strengthening progressions.  Justification for continued skilled care: To address remaining functional, objective and subjective deficits to allow the patient to return to full independence with ADLs. Skilled intervention required to improve ROM which will improve function. Progressive strengthening to stabilize the knee to improve function.    Plan:  Exercises targeting surrounding musculature to strengthen and improve function. Manual therapy to improve joint mobility. Exercises to gradually increase flexibility and range of motion of the knee.      Objective:   L knee  "AROM  105 deg flexion  Lacking 3 deg of extension    Treatment Performed: (\"NP\" = Not Performed)     Therapeutic Exercise:     45 minutes  Access Code: [email or web address]  Nustep x 8 min seat 9  Slant board stretch 30 sec 3x   Standing hamstring stretch 30s x 3  LAQ 2 x 10 5#   Seated hamstring curl G 2x10  Shuttle leg press B/U 75/50# 2x10 ea  Standing Knee Flexion Stretch on Step 16\" 10s x 10  Step ups F/L 8\" 2x10 ea   Step down 6\" 2x10  Prone quad stretch with 30 sec 5x with green strap  Performed manual knee stretching with flexion/extension OP with joint oscillations       Manual Therapy:       minutes        Neuromuscular Re-education:      minutes      Modalities:       Vasopneumatic Device       minutes  Electrical Stimulation          minutes  Ultrasound            minutes  Iontophoresis                     minutes  Cold Pack            minutes  Mechanical Traction           minutes    Gait Training:            minutes        Re-Evaluation:   minutes      "

## 2025-04-10 ENCOUNTER — APPOINTMENT (OUTPATIENT)
Dept: PHYSICAL THERAPY | Facility: CLINIC | Age: 70
End: 2025-04-10
Payer: MEDICARE

## 2025-04-10 NOTE — PROGRESS NOTES
PHYSICAL THERAPY TREATMENT NOTE    Patient Name:  Navi Valdez   Patient MRN: [id number]  Date: 4/10/2025         Problem List Items Addressed This Visit    None        Insurance:  Visit number: Visit count could not be calculated. Make sure you are using a visit which is associated with an episode.   Insurance Type: Payor: MEDICARE / Plan: MEDICARE PART A AND B / Product Type: *No Product type* /   Authorization or Plan of Care date Range: n/a    General:  Reason for visit: s/p L TKA   Referred by: MD Nelida Dick MD appt: 4/22/25   Surgery: Left Total Knee Replacement - Left  Date of Last Surgery: 2/7/2025     Precautions:  None, L SANDRA, Afib on Eloquis, HTN  Fall Risk: Low    Subjective:  Navi reports he feels like his condition is improving and pain is decreasing.   Pain (0-10): 1-2   HEP adherence / understanding: patient reports compliance with the instructed home exercises.    Assessment:  Education: Reviewed home exercise program. Provided verbal feedback to improve exercise technique.  Progress towards functional goals: Improved gait quality. Improved walking distance. Reduced frequency of pain. Reduced intensity of pain.  Response to interventions: Tolerated treatment session well without any increase in pain. Improved joint mobility.  Improved independence with home exercises. Improved tolerance to strengthening progressions.  Justification for continued skilled care: To address remaining functional, objective and subjective deficits to allow the patient to return to full independence with ADLs. Skilled intervention required to improve ROM which will improve function. Progressive strengthening to stabilize the knee to improve function.    Plan:  Exercises targeting surrounding musculature to strengthen and improve function. Manual therapy to improve joint mobility. Exercises to gradually increase flexibility and range of motion of the knee.      Objective:   L knee AROM  105 deg flexion  Lacking 3 deg  "of extension    Treatment Performed: (\"NP\" = Not Performed)     Therapeutic Exercise:       minutes  Access Code: [email or web address]  Nustep x 8 min seat 9  Slant board stretch 30 sec 3x   Standing hamstring stretch 30s x 3  LAQ 2 x 10 5#   Seated hamstring curl G 2x10  Shuttle leg press B/U 75/50# 2x10 ea  Standing Knee Flexion Stretch on Step 16\" 10s x 10  Step ups F/L 8\" 2x10 ea   Step down 6\" 2x10  Prone quad stretch with 30 sec 5x with green strap  Performed manual knee stretching with flexion/extension OP with joint oscillations       Manual Therapy:       minutes        Neuromuscular Re-education:      minutes      Modalities:       Vasopneumatic Device       minutes  Electrical Stimulation          minutes  Ultrasound            minutes  Iontophoresis                     minutes  Cold Pack            minutes  Mechanical Traction           minutes    Gait Training:            minutes        Re-Evaluation:   minutes      "

## 2025-04-11 ENCOUNTER — TELEPHONE (OUTPATIENT)
Dept: ORTHOPEDIC SURGERY | Facility: CLINIC | Age: 70
End: 2025-04-11
Payer: MEDICARE

## 2025-04-11 DIAGNOSIS — M17.12 ARTHRITIS OF LEFT KNEE: ICD-10-CM

## 2025-04-11 RX ORDER — TRAMADOL HYDROCHLORIDE 50 MG/1
50 TABLET ORAL EVERY 8 HOURS PRN
Qty: 21 TABLET | Refills: 0 | Status: SHIPPED | OUTPATIENT
Start: 2025-04-11 | End: 2025-04-18

## 2025-04-11 RX ORDER — OXYCODONE HYDROCHLORIDE 5 MG/1
5 TABLET ORAL EVERY 8 HOURS PRN
Qty: 21 TABLET | Refills: 0 | Status: SHIPPED | OUTPATIENT
Start: 2025-04-11 | End: 2025-04-18

## 2025-04-11 NOTE — TELEPHONE ENCOUNTER
Patient called in to request refills for the following prescriptions.    Tramadol ( 2 tabs left )    Oxycodone ( 2 tabs left )    OARRS scanned 2/28/25    DOS 2/7/25    .  [id number] - [place], [place] - [address] AT [facility]  [address] [place] [address]  Phone: [phone] Fax: [phone]

## 2025-04-14 ENCOUNTER — TREATMENT (OUTPATIENT)
Dept: PHYSICAL THERAPY | Facility: CLINIC | Age: 70
End: 2025-04-14
Payer: MEDICARE

## 2025-04-14 DIAGNOSIS — M17.12 PRIMARY OSTEOARTHRITIS OF LEFT KNEE: ICD-10-CM

## 2025-04-14 PROCEDURE — 97110 THERAPEUTIC EXERCISES: CPT | Mod: GP | Performed by: PHYSICAL THERAPIST

## 2025-04-14 NOTE — PROGRESS NOTES
PHYSICAL THERAPY TREATMENT NOTE    Patient Name:  Navi Valdez   Patient MRN: [id number]  Date: 4/14/2025  Time Calculation  Start Time: 1115  Stop Time: 1155  Time Calculation (min): 40 min      Problem List Items Addressed This Visit             ICD-10-CM    Primary osteoarthritis of left knee M17.12       Insurance:  Visit number: 12   Insurance Type: Payor: MEDICARE / Plan: MEDICARE PART A AND B / Product Type: *No Product type* /   Authorization or Plan of Care date Range: n/a    General:  Reason for visit: s/p L TKA   Referred by: MD Nelida Dick MD appt: 4/22/25   Surgery: Left Total Knee Replacement - Left  Date of Last Surgery: 2/7/2025     Precautions:  None, L SANDRA, Afib on Eloquis, HTN  Fall Risk: Low    Subjective:  Navi reports he feels like his condition is improving.  Knee is still tight, pain and mobility continues to improve.   Pain (0-10): 1   HEP adherence / understanding: patient reports compliance with the instructed home exercises.    Assessment:  Patient overall doing well with improved mobility and pain level. Will continue with HEP to maintain progress.   Education: Reviewed home exercise program.  Progress towards functional goals: Improved ability to sleep thru the night. Improved gait quality. Improved ability to negotiate stairs. Improved walking distance. Improved ability to complete household activities. Reduced frequency of pain. Reduced intensity of pain.  Response to interventions: Tolerated treatment session well without any increase in pain. Improved joint mobility.  Improved independence with home exercises. Improved tolerance to strengthening progressions.  Justification for continued skilled care: To address remaining functional, objective and subjective deficits to allow the patient to return to full independence with ADLs. Skilled intervention required to improve ROM which will improve function. Progressive strengthening to stabilize the knee to improve function. Modify  "and progress home exercise program. Reduce pain to improve function.    Plan:  Navi will be placed on hold for a week pending physician appointment.      Objective:   Goals:  Patient to be Indep in [facility] for self management of symptoms-Met, on-going     LEFS: 60/80 to indicate a significant improvement in overall function.  not met, but improved, 52 from 24     Patient will demo 5/5 knee strength (all planes) to allow for correct gait and stair mechanics -MET     Patient will demo mild to no limitation AROM left knee to allow for correct mechanics with functional mobility.-MET 2-110 deg     Patient to demonstrate gait with least restrictive device for all ADLS and does not demonstrate significant deviations that may contribute to discomfort in the future-MET     Patient to negotiate stairs reciprocally and descend with near equal eccentric control without use of hand rail. -MET     Improve Timed Single leg Stance by 8 seconds (MDC = 8 seconds) -MET     TUG without device = 15 seconds or less indicating reduced fall risk-MET     5 x sit to stand < 15 seconds indicating reduced fall risk  -MET      Treatment Performed: (\"NP\" = Not Performed)     Therapeutic Exercise:     40 minutes  Access Code: [email or web address]  Nustep x 8 min seat 9  Slant board stretch 30 sec 3x   Standing hamstring stretch 30s x 3  LAQ 2 x 10 5#   Seated hamstring curl G 2x10  Shuttle leg press B/U 75/50# 2x10 ea  Standing Knee Flexion Stretch on Step 16\" 10s x 10  Step ups F/L 8\" 2x10 ea   Step down 6\" 2x10  Prone quad stretch with 30 sec 5x with green strap  Performed manual knee stretching with flexion/extension OP with joint oscillations       Manual Therapy:       minutes        Neuromuscular Re-education:      minutes      Modalities:       Vasopneumatic Device       minutes  Electrical Stimulation          minutes  Ultrasound            minutes  Iontophoresis                     minutes  Cold Pack            minutes  Mechanical Traction           " minutes    Gait Training:            minutes        Re-Evaluation:   minutes

## 2025-04-18 ENCOUNTER — APPOINTMENT (OUTPATIENT)
Dept: CARDIOLOGY | Facility: CLINIC | Age: 70
End: 2025-04-18
Payer: MEDICARE

## 2025-04-22 ENCOUNTER — HOSPITAL ENCOUNTER (OUTPATIENT)
Dept: RADIOLOGY | Facility: CLINIC | Age: 70
Discharge: HOME | End: 2025-04-22
Payer: MEDICARE

## 2025-04-22 ENCOUNTER — OFFICE VISIT (OUTPATIENT)
Dept: ORTHOPEDIC SURGERY | Facility: CLINIC | Age: 70
End: 2025-04-22
Payer: MEDICARE

## 2025-04-22 VITALS — HEIGHT: 73 IN | WEIGHT: 255 LBS | BODY MASS INDEX: 33.8 KG/M2

## 2025-04-22 DIAGNOSIS — Z96.652 PRESENCE OF LEFT ARTIFICIAL KNEE JOINT: ICD-10-CM

## 2025-04-22 DIAGNOSIS — Z96.652 PRESENCE OF LEFT ARTIFICIAL KNEE JOINT: Primary | ICD-10-CM

## 2025-04-22 DIAGNOSIS — M17.11 ARTHRITIS OF RIGHT KNEE: ICD-10-CM

## 2025-04-22 PROCEDURE — 99213 OFFICE O/P EST LOW 20 MIN: CPT | Performed by: ORTHOPAEDIC SURGERY

## 2025-04-22 PROCEDURE — 73560 X-RAY EXAM OF KNEE 1 OR 2: CPT | Mod: LEFT SIDE | Performed by: RADIOLOGY

## 2025-04-22 PROCEDURE — 73560 X-RAY EXAM OF KNEE 1 OR 2: CPT | Mod: LT

## 2025-04-22 NOTE — LETTER
April 24, 2025     Patient: Navi Valdez   YOB: 1955   Date of Visit: 4/22/2025       To Whom It May Concern:    It is my medical opinion that Navi Valdez may return to work on 04/28/2025 full duty .    If you have any questions or concerns, please don't hesitate to call.         Sincerely,        Edison Seth MD    CC: No Recipients

## 2025-04-23 ENCOUNTER — OFFICE VISIT (OUTPATIENT)
Dept: CARDIOLOGY | Facility: CLINIC | Age: 70
End: 2025-04-23
Payer: MEDICARE

## 2025-04-23 VITALS
SYSTOLIC BLOOD PRESSURE: 138 MMHG | DIASTOLIC BLOOD PRESSURE: 80 MMHG | WEIGHT: 271.6 LBS | HEIGHT: 72 IN | HEART RATE: 64 BPM | OXYGEN SATURATION: 93 % | BODY MASS INDEX: 36.79 KG/M2

## 2025-04-23 DIAGNOSIS — R60.0 LOWER EXTREMITY EDEMA: ICD-10-CM

## 2025-04-23 DIAGNOSIS — I10 HYPERTENSION, UNSPECIFIED TYPE: ICD-10-CM

## 2025-04-23 DIAGNOSIS — I48.0 PAROXYSMAL ATRIAL FIBRILLATION (MULTI): ICD-10-CM

## 2025-04-23 DIAGNOSIS — I25.10 ATHEROSCLEROSIS OF NATIVE CORONARY ARTERY OF NATIVE HEART WITHOUT ANGINA PECTORIS: ICD-10-CM

## 2025-04-23 DIAGNOSIS — R06.09 DYSPNEA ON EXERTION: Primary | ICD-10-CM

## 2025-04-23 DIAGNOSIS — E78.2 HYPERLIPIDEMIA, MIXED: ICD-10-CM

## 2025-04-23 DIAGNOSIS — R06.09 DYSPNEA ON EXERTION: ICD-10-CM

## 2025-04-23 LAB
ATRIAL RATE: 75 BPM
P AXIS: 56 DEGREES
P OFFSET: 163 MS
P ONSET: 122 MS
PR INTERVAL: 204 MS
Q ONSET: 224 MS
QRS COUNT: 12 BEATS
QRS DURATION: 88 MS
QT INTERVAL: 396 MS
QTC CALCULATION(BAZETT): 442 MS
QTC FREDERICIA: 426 MS
R AXIS: 7 DEGREES
T AXIS: 21 DEGREES
T OFFSET: 422 MS
VENTRICULAR RATE: 75 BPM

## 2025-04-23 PROCEDURE — 1036F TOBACCO NON-USER: CPT | Performed by: STUDENT IN AN ORGANIZED HEALTH CARE EDUCATION/TRAINING PROGRAM

## 2025-04-23 PROCEDURE — 3075F SYST BP GE 130 - 139MM HG: CPT | Performed by: STUDENT IN AN ORGANIZED HEALTH CARE EDUCATION/TRAINING PROGRAM

## 2025-04-23 PROCEDURE — 99212 OFFICE O/P EST SF 10 MIN: CPT | Performed by: STUDENT IN AN ORGANIZED HEALTH CARE EDUCATION/TRAINING PROGRAM

## 2025-04-23 PROCEDURE — 93005 ELECTROCARDIOGRAM TRACING: CPT | Performed by: STUDENT IN AN ORGANIZED HEALTH CARE EDUCATION/TRAINING PROGRAM

## 2025-04-23 PROCEDURE — 3079F DIAST BP 80-89 MM HG: CPT | Performed by: STUDENT IN AN ORGANIZED HEALTH CARE EDUCATION/TRAINING PROGRAM

## 2025-04-23 PROCEDURE — 99214 OFFICE O/P EST MOD 30 MIN: CPT | Performed by: STUDENT IN AN ORGANIZED HEALTH CARE EDUCATION/TRAINING PROGRAM

## 2025-04-23 PROCEDURE — 1159F MED LIST DOCD IN RCRD: CPT | Performed by: STUDENT IN AN ORGANIZED HEALTH CARE EDUCATION/TRAINING PROGRAM

## 2025-04-23 PROCEDURE — 3008F BODY MASS INDEX DOCD: CPT | Performed by: STUDENT IN AN ORGANIZED HEALTH CARE EDUCATION/TRAINING PROGRAM

## 2025-04-23 RX ORDER — ATORVASTATIN CALCIUM 20 MG/1
40 TABLET, FILM COATED ORAL DAILY
Qty: 180 TABLET | Refills: 3 | Status: SHIPPED | OUTPATIENT
Start: 2025-04-23 | End: 2026-04-23

## 2025-04-23 RX ORDER — FUROSEMIDE 20 MG/1
20 TABLET ORAL DAILY PRN
Qty: 30 TABLET | Refills: 1 | Status: SHIPPED | OUTPATIENT
Start: 2025-04-23 | End: 2025-04-23 | Stop reason: SDUPTHER

## 2025-04-23 RX ORDER — FUROSEMIDE 20 MG/1
20 TABLET ORAL DAILY
Qty: 90 TABLET | Refills: 3 | Status: SHIPPED | OUTPATIENT
Start: 2025-04-23 | End: 2026-04-23

## 2025-04-23 RX ORDER — GLUCOSAMINE/CHONDRO SU A 500-400 MG
1 TABLET ORAL 3 TIMES DAILY
COMMUNITY

## 2025-04-23 NOTE — PROGRESS NOTES
Referred by Dr. Robles for Hypertension, Hyperlipidemia, and Atrial Fibrillation (Former [facility] patient/)     History Of Present Illness:    Navi Valdez is a 69 y.o. male past with history notable for CAD based on a coronary CTA which showed no obstructive CAD in LAD territory, paroxysmal atrial fibrillation on Eliquis, hypertension, hyperlipidemia who is here to establish care.  Patient recently underwent left knee replacement he has been undergoing rehab since that time.  Denies chest pain but has had swelling in his legs and has some mild shortness of breath dyspnea with exertion.  Has been compliant with medical therapy.  EKG today shows sinus rhythm with PVCs.  Most recent labs show elevated cholesterol with LDL of 150.      Past Medical History:  He has a past medical history of A-fib (Multi), Anxiety, Arthritis, Hyperlipidemia, Hypertension, Personal history of other medical treatment, Personal history of other medical treatment, and Vision loss.    Past Surgical History:  He has a past surgical history that includes Tonsillectomy; Hernia repair; Colonoscopy; Meniscectomy; Back surgery; Hip Arthroplasty; Joint replacement; Foot surgery; and Vasectomy.      Social History:  He reports that he has never smoked. He has never been exposed to tobacco smoke. He has never used smokeless tobacco. He reports that he does not drink alcohol and does not use drugs.    Family History:  Family History[1]     Allergies:  Patient has no known allergies.    Outpatient Medications:  Current Outpatient Medications   Medication Instructions    acetaminophen (TYLENOL) 975 mg, oral, 3 times daily    apixaban (ELIQUIS) 5 mg, oral, 2 times daily    atorvastatin (LIPITOR) 40 mg, oral, Daily    furosemide (LASIX) 20 mg, oral, Daily PRN    glucosamine-chondroitin 500-400 mg tablet 1 tablet, 3 times daily    hydroCHLOROthiazide (HYDRODIURIL) 25 mg, oral, Daily    lisinopril 40 mg, oral, Daily    metoprolol succinate XL (TOPROL-XL) 100  mg, oral, Daily        Last Recorded Vitals:  Vitals:    04/23/25 1050   BP: 138/80   BP Location: Left arm   Patient Position: Sitting   BP Cuff Size: Adult   Pulse: 64   SpO2: 93%   Weight: 123 kg (271 lb 9.6 oz)   Height: 1.829 m (6')       Physical Exam:  General: No acute distress,  A&O x3  Skin: Warm and dry  Neck: JVD is not elevated  ENT: Moist mucous membranes no lesions appreciated  Pulmonary: CTAB  Cards: Regular rate rhythm with occasional ectopic rhythm, no murmurs gallops or rubs appreciated normal S1-S2  Abdomen: Soft nontender nondistended  Extremities: Plus edema noted bilaterally  Psych: Appropriate mood and affect     @PESEC->PESEC(CIRCULAR REFERENCE)@       Last Labs:  CBC -  Lab Results   Component Value Date    WBC 13.3 (H) 02/08/2025    HGB 12.5 (L) 02/08/2025    HCT 37.1 (L) 02/08/2025    MCV 90 02/08/2025     02/08/2025       CMP -  Lab Results   Component Value Date    CALCIUM 8.2 (L) 02/08/2025    PROT 6.7 01/20/2023    ALBUMIN 4.6 01/20/2023    AST 13 01/20/2023    ALT 15 01/20/2023    ALKPHOS 80 01/20/2023    BILITOT 1.1 01/20/2023       LIPID PANEL -   Lab Results   Component Value Date    CHOL 175 12/30/2022    TRIG 107 12/30/2022    HDL 44.4 12/30/2022    CHHDL 3.9 12/30/2022    LDLF 109 (H) 12/30/2022    VLDL 21 12/30/2022       RENAL FUNCTION PANEL -   Lab Results   Component Value Date    GLUCOSE 140 (H) 02/08/2025     (L) 02/08/2025    K 3.8 02/08/2025    CL 99 02/08/2025    CO2 25 02/08/2025    ANIONGAP 13 02/08/2025    BUN 23 02/08/2025    CREATININE 0.68 02/08/2025    GFRMALE >90 02/01/2023    CALCIUM 8.2 (L) 02/08/2025    ALBUMIN 4.6 01/20/2023        Lab Results   Component Value Date    BNP 67 10/19/2024    HGBA1C 5.2 01/27/2025       Last Cardiology Tests:  ECG:  ECG 12 lead (Clinic Performed) 04/23/2025 (Preliminary)    Assessment/Plan     1.  CAD: Nonobstructive based on previous coronary CTA from 2022.  No angina symptoms today.  He is on aspirin and  atorvastatin.  LDL is not at goal so we will increase atorvastatin to 40 mg daily.    2.  Dyspnea with exertion lower extreme edema: Swelling appreciated bilateral ankles at least 1+ edema.  He is LVEF of 50%.  Repeat echocardiogram.  Start Lasix 20 mg daily for next 14 days.    3.  Paroxysmal atrial fibrillation: Maintaining sinus rhythm today.  He is on Eliquis for CVA prophylaxis.    4.  Hypertension: Blood pressures are reasonable control with current medical therapy.    5.  Hyperlipidemia: We are increasing atorvastatin to 40 mg daily due to elevated LDLs.    Follow-up in 6 months    (This note was generated with voice recognition software and may contain errors including spelling, grammar, syntax and missed recognition of what was dictated, of which may not have been fully corrected)       Medhat Robles MD PhD       [1]   Family History  Problem Relation Name Age of Onset    Lung cancer Mother      Heart disease Father

## 2025-04-24 NOTE — PROGRESS NOTES
69-year-old is seen following left total knee arthroplasty February 7, 2025.  He is progressing well and having less pain and is satisfied with his progress.  He is considering right knee replacement next January or February.    Pleasant in no acute distress.  Walks with a essentially normal gait.  The incision is well-healed and range of motion is 0 to 120 degrees without instability.  Mild tenderness.    Multiple x-ray views of the left knee are personally reviewed and the left total knee arthroplasty is in good position and well-fixed.  Continue with an exercise program.  He will return to work February 28, 2025 for regular duty.  He will follow-up at 1 year from surgery on the left knee.  When he finalizes his decision on when to proceed with the right knee replacement he can call to schedule but he would also need to be seen in the office prior.

## 2025-04-28 ENCOUNTER — APPOINTMENT (OUTPATIENT)
Dept: CARDIOLOGY | Facility: CLINIC | Age: 70
End: 2025-04-28
Payer: MEDICARE

## 2025-05-02 ENCOUNTER — HOSPITAL ENCOUNTER (OUTPATIENT)
Dept: CARDIOLOGY | Facility: CLINIC | Age: 70
Discharge: HOME | End: 2025-05-02
Payer: MEDICARE

## 2025-05-02 DIAGNOSIS — E78.2 HYPERLIPIDEMIA, MIXED: ICD-10-CM

## 2025-05-02 DIAGNOSIS — I10 HYPERTENSION, UNSPECIFIED TYPE: ICD-10-CM

## 2025-05-02 DIAGNOSIS — I25.10 ATHEROSCLEROSIS OF NATIVE CORONARY ARTERY OF NATIVE HEART WITHOUT ANGINA PECTORIS: ICD-10-CM

## 2025-05-02 DIAGNOSIS — R06.09 DYSPNEA ON EXERTION: ICD-10-CM

## 2025-05-02 PROCEDURE — 93306 TTE W/DOPPLER COMPLETE: CPT

## 2025-05-05 ENCOUNTER — OFFICE VISIT (OUTPATIENT)
Dept: PAIN MEDICINE | Facility: CLINIC | Age: 70
End: 2025-05-05
Payer: MEDICARE

## 2025-05-05 ENCOUNTER — TELEPHONE (OUTPATIENT)
Dept: PAIN MEDICINE | Facility: CLINIC | Age: 70
End: 2025-05-05

## 2025-05-05 VITALS
HEIGHT: 73 IN | WEIGHT: 270 LBS | OXYGEN SATURATION: 96 % | HEART RATE: 65 BPM | DIASTOLIC BLOOD PRESSURE: 72 MMHG | SYSTOLIC BLOOD PRESSURE: 117 MMHG | BODY MASS INDEX: 35.78 KG/M2 | RESPIRATION RATE: 10 BRPM | TEMPERATURE: 97.7 F

## 2025-05-05 DIAGNOSIS — M54.41 RIGHT-SIDED LOW BACK PAIN WITH RIGHT-SIDED SCIATICA, UNSPECIFIED CHRONICITY: ICD-10-CM

## 2025-05-05 DIAGNOSIS — M48.062 SPINAL STENOSIS OF LUMBAR REGION WITH NEUROGENIC CLAUDICATION: Primary | ICD-10-CM

## 2025-05-05 DIAGNOSIS — M96.1 POSTLAMINECTOMY SYNDROME OF LUMBAR REGION: ICD-10-CM

## 2025-05-05 PROCEDURE — 3078F DIAST BP <80 MM HG: CPT | Performed by: NURSE PRACTITIONER

## 2025-05-05 PROCEDURE — 99215 OFFICE O/P EST HI 40 MIN: CPT | Performed by: NURSE PRACTITIONER

## 2025-05-05 PROCEDURE — 1159F MED LIST DOCD IN RCRD: CPT | Performed by: NURSE PRACTITIONER

## 2025-05-05 PROCEDURE — 1036F TOBACCO NON-USER: CPT | Performed by: NURSE PRACTITIONER

## 2025-05-05 PROCEDURE — 1125F AMNT PAIN NOTED PAIN PRSNT: CPT | Performed by: NURSE PRACTITIONER

## 2025-05-05 PROCEDURE — 3008F BODY MASS INDEX DOCD: CPT | Performed by: NURSE PRACTITIONER

## 2025-05-05 PROCEDURE — 3074F SYST BP LT 130 MM HG: CPT | Performed by: NURSE PRACTITIONER

## 2025-05-05 RX ORDER — LORAZEPAM 2 MG/1
TABLET ORAL
Qty: 1 TABLET | Refills: 0 | Status: SHIPPED | OUTPATIENT
Start: 2025-05-05

## 2025-05-05 ASSESSMENT — ENCOUNTER SYMPTOMS
SHORTNESS OF BREATH: 0
DIARRHEA: 0
OCCASIONAL FEELINGS OF UNSTEADINESS: 1
NUMBNESS: 1
DIZZINESS: 0
BACK PAIN: 1
FREQUENCY: 0
NAUSEA: 0
HEADACHES: 0
PALPITATIONS: 0
CONFUSION: 0
CONSTIPATION: 0
CHEST TIGHTNESS: 0
DEPRESSION: 0
MYALGIAS: 1
WHEEZING: 0
CHILLS: 0
FATIGUE: 0
LOSS OF SENSATION IN FEET: 0
AGITATION: 0

## 2025-05-05 ASSESSMENT — PAIN SCALES - GENERAL
PAINLEVEL_OUTOF10: 7
PAINLEVEL_OUTOF10: 7

## 2025-05-05 ASSESSMENT — PATIENT HEALTH QUESTIONNAIRE - PHQ9
SUM OF ALL RESPONSES TO PHQ9 QUESTIONS 1 & 2: 0
1. LITTLE INTEREST OR PLEASURE IN DOING THINGS: NOT AT ALL
2. FEELING DOWN, DEPRESSED OR HOPELESS: NOT AT ALL

## 2025-05-05 ASSESSMENT — PAIN DESCRIPTION - DESCRIPTORS: DESCRIPTORS: ACHING;RADIATING;NUMBNESS

## 2025-05-05 ASSESSMENT — PAIN - FUNCTIONAL ASSESSMENT: PAIN_FUNCTIONAL_ASSESSMENT: 0-10

## 2025-05-05 NOTE — PROGRESS NOTES
Chief Complain  Follow-up for lower back pain radiating to right lower extremity    History Of Present Illness  Navi Valdez is a 69 y.o. male here for lower back pain radiating to right lower extremity. The patient rates the pain at 7 on a scale from 0-10.  The patient describes pain as aching, radiating, numbness.  The pain is worsened by bending forward, standing, walking, lifting, and squatting and is alleviated by medications nonsteroidal anti-inflammatory drugs and Tylenol, position change, sitting, and lying down.  Since the last visit the pain has worsened.    The patient denies any fever, chills, weight loss, weakness, bladder/ bowel incontinence, history of cancer, history of IV drug abuse, recent trauma.     Prior office visit:  2/3/2025  Navi Valdez is a 69 y.o. male recalled past medical history of obesity, hypertension, CAD, A-fib on Eliquis, left SANDRA, left knee arthritis, L5 this S1 laminectomy in 1979 at [facility] who is here for follow-up of lower back pain radiating to bilateral lower extremities left side worse than right.  He was treated with caudal epidural steroid injection which relieved 80% of his neuropathic pain.  The injection significantly improves his quality of life and allows him to continue walking and standing without pain.  Of note patient continues to have left knee pain and is scheduled for left TKR on 2/5/2025 with Dr. Seth.  Patient currently works as a  and plans to retire after knee replacement.  Overall lower back symptoms significantly improved.  He denies any new neurological or constitutional symptoms.  Reports significant improvement in neuropathic pain, unable to reproduce neuropathic pain on exam.  Plan to continue with current pain medication as prescribed.  Given the significant improvement in neuropathic pain,will consider repeating fluoroscopy guided caudal epidural steroid injection in the future.  All questions and concerns answered.   Plan to follow-up in 3 months or sooner if needed.       Procedures:  12/10/2024 caudal epidural steroid injection the patient has had a 80% % improvement in pain and function      Portions of record reviewed for pertinent issues: active problem list, medication list, allergies, family history, social history, notes from last encounter, encounters, lab results, imaging and other available records.      I have personally reviewed the OARRS report for this patient. This report is scanned into the electronic medical record. I have considered the risks of abuse, dependence, addiction and diversion. It showed:  Oxycodone 5 mg with tramadol 50 mg from [facility] APRN  OPIOID RISK ASSESSMENT SCORE 0/26  Aberrant behavior: None  My patient has no underlying substance abuse or alcohol abuse and there's no mental health conditions contributing to the patient's pain.    Past Medical History  He has a past medical history of A-fib (Multi), Anxiety, Arthritis, Hyperlipidemia, Hypertension, Personal history of other medical treatment, Personal history of other medical treatment, and Vision loss.    Surgical History  He has a past surgical history that includes Tonsillectomy; Hernia repair; Colonoscopy; Meniscectomy; Back surgery; Hip Arthroplasty; Joint replacement; Foot surgery; and Vasectomy.     Social History  He reports that he has never smoked. He has never been exposed to tobacco smoke. He has never used smokeless tobacco. He reports that he does not drink alcohol and does not use drugs.    Family History  Family History[1]     Allergies  Patient has no known allergies.    Review of Systems  Review of Systems   Constitutional:  Negative for chills and fatigue.   Respiratory:  Negative for chest tightness, shortness of breath and wheezing.    Cardiovascular:  Negative for chest pain and palpitations.   Gastrointestinal:  Negative for constipation, diarrhea and nausea.   Genitourinary:  Negative for frequency and urgency.  "  Musculoskeletal:  Positive for back pain and myalgias.   Neurological:  Positive for numbness. Negative for dizziness and headaches.   Psychiatric/Behavioral:  Negative for agitation, confusion and suicidal ideas.         Physical Exam  Physical Exam  Constitutional:       General: He is not in acute distress.     Appearance: Normal appearance.      Comments: Lumbar decreased range of motion, pain with facet loading, right patellar and Achilles reflexes diminished, bilateral lower extremity vibratory sensation intact, sit slump sups negative unable to reproduce neuropathic pain, denies any bowel or bladder incontinence or saddle paresthesia.   HENT:      Head: Normocephalic.   Musculoskeletal:      Lumbar back: Tenderness present. Decreased range of motion. Positive right straight leg raise test.   Neurological:      General: No focal deficit present.      Mental Status: He is alert and oriented to person, place, and time.      GCS: GCS eye subscore is 4. GCS verbal subscore is 5. GCS motor subscore is 6.      Cranial Nerves: Cranial nerves 2-12 are intact.      Sensory: Sensation is intact.      Motor: Motor function is intact.      Coordination: Coordination is intact.      Deep Tendon Reflexes:      Reflex Scores:       Patellar reflexes are 2+ on the right side.       Achilles reflexes are 2+ on the right side and 3+ on the left side.     Comments: Antalgic gait   Psychiatric:         Attention and Perception: Attention and perception normal.         Mood and Affect: Mood normal.         Speech: Speech normal.         Behavior: Behavior normal.         Thought Content: Thought content normal.         Cognition and Memory: Cognition normal.         Judgment: Judgment normal.         Last Recorded Vitals  Blood pressure 117/72, pulse 65, temperature 36.5 °C (97.7 °F), temperature source Temporal, resp. rate 10, height 1.842 m (6' 0.5\"), weight 122 kg (270 lb), SpO2 96%.    Reviewed Images  12/14/2024 MRI of the " lumbar spine showed bone marrow edema and endplate changes worse at L4 and L5, severe degenerative changes L1-S1, spondylosis with:  laminectomy at the L5-S1 with scarring and fatty tissues with moderate to severe foraminal stenosis  Moderate to severe canal stenosis at L2-3 with ligamentum flavum hypertrophy with minimal bilateral foraminal stenosis  Severe canal stenosis at L3-4 with ligamentum flavum hypertrophy moderate to severe bilateral foraminal stenosis  Severe right L4-5 foraminal stenosis        FINDINGS:  There are 5 non rib-bearing lumbar vertebral bodies. The lowest  intervertebral disc will be labeled L5-S1.      Alignment: Dextroscoliosis and straightening of the normal lumbar  lordosis and stable grade 1 L3-4, L4-5 and L5-S1 retrolisthesis.  Grade 1 L2-3 anterolisthesis.      Vertebrae/Intervertebral Discs: Stable mild chronic wedging of the  superior L1 vertebral body. Otherwise, the vertebral bodies  demonstrate expected and height. No evidence of an acute fracture.  Nonspecific heterogeneous appearance of the marrow signal. Small  scattered benign intraosseous hemangiomas are seen. Multilevel disc  desiccation and disc height loss with endplate degenerative changes  and osteophytic spurring. Disc height loss most pronounced at L4-5.  Mild Modic type 1 changes involving the L3-L4 endplates.      Postsurgical changes from prior S1 laminectomy.      Conus medullaris: The lower thoracic cord appears unremarkable. The  conus medullaris terminates at L1.      T12-L1: Disc bulge, facet arthrosis and ligamentum flavum hypertrophy  without significant spinal canal stenosis. No significant right and  minimal left neuroforaminal stenosis.      L1-2: Disc bulge with osteophyte spurring, prominent dorsal epidural  fat, facet arthrosis and ligamentum flavum hypertrophy with mild  spinal canal stenosis. Mild bilateral neuroforaminal stenosis.      L2-3: Disc bulge eccentric extending into the  extraforaminal  compartment. There is osteophyte spurring. There is a left  extraforaminal annular fissure. Facet arthrosis, ligamentum flavum  hypertrophy and prominent epidural fat contributing to moderate to  severe spinal canal stenosis and mild to moderate left and mild right  neuroforaminal stenosis. There is effacement of the bilateral  subarticular recesses left worse than right with compression of the  traversing left-sided nerve root.      L3-4: Disc bulge asymmetric to the left, prominent epidural fat,  facet arthrosis and ligamentum flavum hypertrophy contributes to  severe spinal canal and moderate bilateral neuroforaminal stenosis,  left greater than right. There is effacement of the bilateral  subarticular recess with compression of the bilateral traversing  nerve roots.      L4-5: Postsurgical changes from prior laminectomy. There is a disc  osteophyte complex. There is facet arthrosis and prominent epidural  fat. There is mild spinal canal stenosis. There is effacement of the  bilateral subarticular recesses. Severe right and moderate left  neuroforaminal stenosis.      L5-S1: Postsurgical changes from prior right hemilaminectomy. There  is facet arthrosis. Epidural fat contributes to moderate effacement  of the thecal sac. There is effacement of the bilateral subarticular  recesses. Moderate to severe bilateral neuroforaminal stenosis.      Mild edema along the medial aspect of the left psoas muscle at the  level L3 (series 3, image 21). Otherwise, the prevertebral and  paraspinal soft tissues are unremarkable.      IMPRESSION:  1. Postsurgical changes from prior S1 laminectomy. There is mild  spinal canal stenosis at the L4-L5 level and effacement of the  bilateral subarticular recess. There is moderate effacement of the  thecal sac at L5-S1 level mostly secondary to prominent epidural  lipomatosis.  2. Additional multilevel degenerative changes of the lumbar spine  contributing to spinal canal  stenosis most pronounced at L2-L3, L3-L4  (severe) and neuroforaminal stenosis most pronounced at left L2-L3,  bilateral L3-4, L4-5 and L5-S1.  3. Mild edema within the medial aspect of the right psoas muscle at  level of L3 may be reactive, recommend correlation with patient's  symptomatology and laboratory values to exclude an early infectious  process.      I personally reviewed the images/study and I agree with the findings  as stated by Resident Sade Curran.      MACRO:  Critical Finding:  See findings. Notification was initiated on  12/16/2024 at 7:31 pm by  Sade Curran.  (**-YCF-**)      Signed by: Elva Novoa 12/17/2024 6:33 AM       Reviewed Labs  Lab Results   Component Value Date    GLUCOSE 140 (H) 02/08/2025    CALCIUM 8.2 (L) 02/08/2025     (L) 02/08/2025    K 3.8 02/08/2025    CO2 25 02/08/2025    CL 99 02/08/2025    BUN 23 02/08/2025    CREATININE 0.68 02/08/2025        Assessment/Plan     Navi Valdez is a 69 y.o. male recall past medical history of obesity, hypertension, CAD, A-fib on Eliquis, left SANDRA, left knee arthritis, L5 this S1 laminectomy in 1979 at [facility] who is here for follow-up of lower back pain radiating to right lower extremity symptoms w\orse with walking and bending.  In the past he has been treated with caudal epidural steroid injection when he had bilateral lower extremity radiculopathy.  His pain is now located in the right side of lower back radiating into right lower extremity.  A physical examination has decreased range of motion with lumbar flexion extension, pain with extension, right sit slump test positive with right lower extremity diminished reflexes.  Vibratory sensation intact denies bowel or bladder incontinence or saddle paresthesia.  After reviewing his lumbar MRI with Dr. Linton plan for fluoroscopy guided right L3-L4 L4-L5 transforaminal epidural steroid injection.  Provided order of diazepam 1 hour prior to exam. I spent time with the  patient discussing all of the risks, benefits, and alternatives to this measure. Including but not limited to spinal infection, epidural hematoma/abscess, paralysis, nerve injury, steroid effects, and spinal headache. The patient understands and will schedule a repeat as needed.  He is managing his pain with Tylenol 1000 mg every 8 hours for symptom relief.     Plan  At least 50% of the visit was involved in the discussion of the options for treatment. We discussed exercises, medication, interventional therapies and surgery. Healthy life style is essential with patient hard work to achieve the wellness. In addition; discussion with the patient and/or family about any of the diagnostic results, impressions and/or recommended diagnostic studies, prognosis, risks and benefits of treatment options, instructions for treatment and/or follow-up, importance of compliance with chosen treatment options, risk-factor reduction, and patient/family education.     Provided referral for aqua therapy  Continue self-directed physical therapy  Plan for fluoroscopy guided right L3-4, L4-L5 transforaminal EMILIANA for right lumbar radiculopathy symptoms.  Plan for Ativan 1 hour prior to exam  Healthy lifestyle and anti-inflammatory diet in addition to weight control discussed with the patient  Alternative chronic pain therapies was discussed, encouraged and information was handed  Return to Clinic 6 to 8 weeks after injection completed     *Please note this report has been produced using speech recognition software and may contain errors related to that system including grammar, punctuation and spelling as well as words and phrases that may be inappropriate. If there are questions or concerns, please feel free to contact me to clarify.      I spent 56 minutes in the professional and overall care of this patient.       LUIS MANUEL Ash-CARSON            [1]   Family History  Problem Relation Name Age of Onset    Lung cancer Mother      Heart  disease Father

## 2025-05-06 LAB
AORTIC VALVE MEAN GRADIENT: 5 MMHG
AORTIC VALVE PEAK VELOCITY: 1.52 M/S
AV PEAK GRADIENT: 9 MMHG
AVA (PEAK VEL): 3.08 CM2
AVA (VTI): 3.08 CM2
EJECTION FRACTION APICAL 4 CHAMBER: 56.6
EJECTION FRACTION: 53 %
LEFT ATRIUM VOLUME AREA LENGTH INDEX BSA: 33.2 ML/M2
LEFT VENTRICLE INTERNAL DIMENSION DIASTOLE: 4.97 CM (ref 3.5–6)
LEFT VENTRICULAR OUTFLOW TRACT DIAMETER: 2.3 CM
RIGHT VENTRICLE FREE WALL PEAK S': 0.17 CM/S
RIGHT VENTRICLE PEAK SYSTOLIC PRESSURE: 24.3 MMHG
TRICUSPID ANNULAR PLANE SYSTOLIC EXCURSION: 2.4 CM

## 2025-06-13 DIAGNOSIS — I48.91 ATRIAL FIBRILLATION, UNSPECIFIED TYPE (MULTI): ICD-10-CM

## 2025-06-13 DIAGNOSIS — R60.0 EDEMA OF BOTH LOWER LEGS: ICD-10-CM

## 2025-06-13 DIAGNOSIS — I48.0 PAROXYSMAL ATRIAL FIBRILLATION (MULTI): ICD-10-CM

## 2025-06-13 DIAGNOSIS — I10 PRIMARY HYPERTENSION: ICD-10-CM

## 2025-06-13 DIAGNOSIS — I25.10 ATHEROSCLEROSIS OF CORONARY ARTERY OF NATIVE HEART, UNSPECIFIED VESSEL OR LESION TYPE, UNSPECIFIED WHETHER ANGINA PRESENT: ICD-10-CM

## 2025-06-13 DIAGNOSIS — I10 HYPERTENSION, UNSPECIFIED TYPE: ICD-10-CM

## 2025-06-16 RX ORDER — HYDROCHLOROTHIAZIDE 25 MG/1
25 TABLET ORAL DAILY
Qty: 90 TABLET | Refills: 3 | Status: SHIPPED | OUTPATIENT
Start: 2025-06-16 | End: 2026-06-16

## 2025-06-16 RX ORDER — METOPROLOL SUCCINATE 100 MG/1
100 TABLET, EXTENDED RELEASE ORAL DAILY
Qty: 90 TABLET | Refills: 3 | Status: SHIPPED | OUTPATIENT
Start: 2025-06-16 | End: 2026-06-16

## 2025-06-16 RX ORDER — ATORVASTATIN CALCIUM 20 MG/1
40 TABLET, FILM COATED ORAL DAILY
Qty: 180 TABLET | Refills: 3 | Status: SHIPPED | OUTPATIENT
Start: 2025-06-16 | End: 2026-06-16

## 2025-08-28 ENCOUNTER — APPOINTMENT (OUTPATIENT)
Dept: PRIMARY CARE | Facility: CLINIC | Age: 70
End: 2025-08-28
Payer: MEDICARE

## 2025-08-28 VITALS
BODY MASS INDEX: 34.78 KG/M2 | OXYGEN SATURATION: 92 % | HEART RATE: 82 BPM | WEIGHT: 260 LBS | SYSTOLIC BLOOD PRESSURE: 108 MMHG | DIASTOLIC BLOOD PRESSURE: 75 MMHG

## 2025-08-28 DIAGNOSIS — M54.2 NECK PAIN: ICD-10-CM

## 2025-08-28 DIAGNOSIS — Z00.00 ROUTINE GENERAL MEDICAL EXAMINATION AT A HEALTH CARE FACILITY: Primary | ICD-10-CM

## 2025-08-28 DIAGNOSIS — H61.20 IMPACTED CERUMEN, UNSPECIFIED LATERALITY: ICD-10-CM

## 2025-08-28 DIAGNOSIS — M79.89 LEG SWELLING: ICD-10-CM

## 2025-08-28 DIAGNOSIS — I10 HYPERTENSION, UNSPECIFIED TYPE: ICD-10-CM

## 2025-08-28 DIAGNOSIS — M48.061 SPINAL STENOSIS OF LUMBAR REGION, UNSPECIFIED WHETHER NEUROGENIC CLAUDICATION PRESENT: ICD-10-CM

## 2025-08-28 DIAGNOSIS — R93.7 ABNORMAL MRI, LUMBAR SPINE: ICD-10-CM

## 2025-08-28 DIAGNOSIS — L98.9 SKIN LESION: ICD-10-CM

## 2025-08-28 DIAGNOSIS — E66.01 OBESITY, MORBID (MULTI): ICD-10-CM

## 2025-08-28 DIAGNOSIS — E78.2 HYPERLIPIDEMIA, MIXED: ICD-10-CM

## 2025-08-28 DIAGNOSIS — R20.2 TINGLING IN EXTREMITIES: ICD-10-CM

## 2025-08-28 DIAGNOSIS — I25.10 ATHEROSCLEROSIS OF NATIVE CORONARY ARTERY OF NATIVE HEART WITHOUT ANGINA PECTORIS: ICD-10-CM

## 2025-08-28 DIAGNOSIS — Z12.5 ENCOUNTER FOR SCREENING PROSTATE SPECIFIC ANTIGEN (PSA) MEASUREMENT: ICD-10-CM

## 2025-08-28 DIAGNOSIS — I73.9 PVD (PERIPHERAL VASCULAR DISEASE): ICD-10-CM

## 2025-08-28 DIAGNOSIS — R73.09 ABNORMAL GLUCOSE: ICD-10-CM

## 2025-08-28 PROCEDURE — 3078F DIAST BP <80 MM HG: CPT | Performed by: NURSE PRACTITIONER

## 2025-08-28 PROCEDURE — 99205 OFFICE O/P NEW HI 60 MIN: CPT | Performed by: NURSE PRACTITIONER

## 2025-08-28 PROCEDURE — G2211 COMPLEX E/M VISIT ADD ON: HCPCS | Performed by: NURSE PRACTITIONER

## 2025-08-28 PROCEDURE — 3074F SYST BP LT 130 MM HG: CPT | Performed by: NURSE PRACTITIONER

## 2025-08-28 PROCEDURE — 1036F TOBACCO NON-USER: CPT | Performed by: NURSE PRACTITIONER

## 2025-08-28 PROCEDURE — 1159F MED LIST DOCD IN RCRD: CPT | Performed by: NURSE PRACTITIONER

## 2025-08-28 ASSESSMENT — ENCOUNTER SYMPTOMS
LIGHT-HEADEDNESS: 0
NECK PAIN: 1
HEMATOLOGIC/LYMPHATIC NEGATIVE: 1
BACK PAIN: 1
DEPRESSION: 0
CONSTITUTIONAL NEGATIVE: 1
EYES NEGATIVE: 1
DIZZINESS: 0
WEAKNESS: 1
ARTHRALGIAS: 1
GASTROINTESTINAL NEGATIVE: 1
WOUND: 0
SHORTNESS OF BREATH: 0
PSYCHIATRIC NEGATIVE: 1
SLEEP DISTURBANCE: 0
POLYPHAGIA: 0
NERVOUS/ANXIOUS: 0
ENDOCRINE NEGATIVE: 1
NECK STIFFNESS: 1
FACIAL ASYMMETRY: 0
ALLERGIC/IMMUNOLOGIC NEGATIVE: 1
RESPIRATORY NEGATIVE: 1
CONFUSION: 0

## 2025-09-16 ENCOUNTER — APPOINTMENT (OUTPATIENT)
Dept: ORTHOPEDIC SURGERY | Facility: CLINIC | Age: 70
End: 2025-09-16
Payer: MEDICARE

## (undated) DEVICE — SLEEVE, VASO PRESS, CALF GARMENT, MEDIUM, GREEN

## (undated) DEVICE — GOWN, SURGICAL, IMPLT, BACK, XLARGE, XLONG, STERILE

## (undated) DEVICE — DRAPE, SHEET, THREE QUARTER, FAN FOLD, 57 X 77 IN

## (undated) DEVICE — DRESSING, MEPILEX BORDER, POST-OP AG, 4 X 12 IN

## (undated) DEVICE — MARKER, SKIN, REGULAR TIP, W/FLEXI-RULER

## (undated) DEVICE — BLADE, SAGITTAL DUAL CUT, 25 X 90 X 1.27

## (undated) DEVICE — DRAPE, TIBURON, SPLIT SHEET, REINF ADH STRIP, 77X108

## (undated) DEVICE — BANDAGE, ELASTIC, ACE, ACE, DOUBLE LENGTH, 6 X 550 IN, LF

## (undated) DEVICE — STRIP, SKIN CLOSURE, STERI STRIP, REINFORCED, 0.5 X 4 IN

## (undated) DEVICE — GOWN, ASTOUND, XL

## (undated) DEVICE — MAT, AIR TRANSFER, 39X81

## (undated) DEVICE — BLADE, SAW, RECIPROCATING, DOUBLE SIDED, THIN, STAINLESS STEEL

## (undated) DEVICE — Device

## (undated) DEVICE — DRAPE, U-DRAPE, NON STERILE

## (undated) DEVICE — CUFF, TOURNIQUET, 30 X 4, DUAL PORT/SNGL BLADDER, DISP, LF

## (undated) DEVICE — TIP, SUCTION, SUPER SUCKER, KAM, MINI, CURVED

## (undated) DEVICE — SPONGE, LAP, XRAY DECT, 18IN X 18IN, W/LOOP, STERILE

## (undated) DEVICE — CEMENT, MIXEVAC III, 10S BOWL, KNEES

## (undated) DEVICE — PADDING, CAST, WYTEX, 6 IN X 4 YD, LF

## (undated) DEVICE — BLADE, SAW, SAGITTAL, 12.5 X 81.5 X 1.27 MM, STAINLESS STEEL, STERILE

## (undated) DEVICE — WOUND SYSTEM, DEBRIDEMENT & CLEANING, O.R DUOPAK